# Patient Record
Sex: FEMALE | Race: OTHER | HISPANIC OR LATINO
[De-identification: names, ages, dates, MRNs, and addresses within clinical notes are randomized per-mention and may not be internally consistent; named-entity substitution may affect disease eponyms.]

---

## 2017-07-14 PROBLEM — Z00.00 ENCOUNTER FOR PREVENTIVE HEALTH EXAMINATION: Status: ACTIVE | Noted: 2017-07-14

## 2017-07-20 ENCOUNTER — APPOINTMENT (OUTPATIENT)
Dept: OTOLARYNGOLOGY | Facility: CLINIC | Age: 72
End: 2017-07-20

## 2017-07-20 VITALS — WEIGHT: 152 LBS | BODY MASS INDEX: 28.7 KG/M2 | HEIGHT: 61 IN

## 2017-07-20 DIAGNOSIS — E11.9 TYPE 2 DIABETES MELLITUS W/OUT COMPLICATIONS: ICD-10-CM

## 2017-07-20 DIAGNOSIS — I10 ESSENTIAL (PRIMARY) HYPERTENSION: ICD-10-CM

## 2017-07-20 DIAGNOSIS — Z78.9 OTHER SPECIFIED HEALTH STATUS: ICD-10-CM

## 2017-07-20 RX ORDER — METOPROLOL SUCCINATE 25 MG/1
25 TABLET, EXTENDED RELEASE ORAL
Qty: 30 | Refills: 0 | Status: ACTIVE | COMMUNITY
Start: 2017-02-03

## 2017-07-20 RX ORDER — SERTRALINE HYDROCHLORIDE 100 MG/1
100 TABLET, FILM COATED ORAL
Qty: 30 | Refills: 0 | Status: ACTIVE | COMMUNITY
Start: 2017-04-17

## 2017-08-17 ENCOUNTER — APPOINTMENT (OUTPATIENT)
Dept: OTOLARYNGOLOGY | Facility: CLINIC | Age: 72
End: 2017-08-17
Payer: MEDICARE

## 2017-08-17 VITALS — WEIGHT: 152 LBS | HEIGHT: 61 IN | BODY MASS INDEX: 28.7 KG/M2

## 2017-08-17 PROCEDURE — 99213 OFFICE O/P EST LOW 20 MIN: CPT | Mod: 25

## 2017-08-17 PROCEDURE — 31575 DIAGNOSTIC LARYNGOSCOPY: CPT

## 2017-09-28 ENCOUNTER — APPOINTMENT (OUTPATIENT)
Dept: OTOLARYNGOLOGY | Facility: CLINIC | Age: 72
End: 2017-09-28

## 2017-12-11 ENCOUNTER — OUTPATIENT (OUTPATIENT)
Dept: OUTPATIENT SERVICES | Facility: HOSPITAL | Age: 72
LOS: 1 days | Discharge: HOME | End: 2017-12-11

## 2017-12-20 DIAGNOSIS — H52.7 UNSPECIFIED DISORDER OF REFRACTION: ICD-10-CM

## 2017-12-20 DIAGNOSIS — H57.11 OCULAR PAIN, RIGHT EYE: ICD-10-CM

## 2017-12-20 DIAGNOSIS — H40.019 OPEN ANGLE WITH BORDERLINE FINDINGS, LOW RISK, UNSPECIFIED EYE: ICD-10-CM

## 2018-04-10 ENCOUNTER — OUTPATIENT (OUTPATIENT)
Dept: OUTPATIENT SERVICES | Facility: HOSPITAL | Age: 73
LOS: 1 days | Discharge: HOME | End: 2018-04-10

## 2018-04-10 DIAGNOSIS — M15.0 PRIMARY GENERALIZED (OSTEO)ARTHRITIS: ICD-10-CM

## 2018-04-10 DIAGNOSIS — M54.2 CERVICALGIA: ICD-10-CM

## 2018-07-23 ENCOUNTER — EMERGENCY (EMERGENCY)
Facility: HOSPITAL | Age: 73
LOS: 0 days | Discharge: HOME | End: 2018-07-23
Attending: EMERGENCY MEDICINE | Admitting: EMERGENCY MEDICINE

## 2018-07-23 VITALS
TEMPERATURE: 97 F | DIASTOLIC BLOOD PRESSURE: 69 MMHG | SYSTOLIC BLOOD PRESSURE: 139 MMHG | HEART RATE: 70 BPM | OXYGEN SATURATION: 95 % | RESPIRATION RATE: 18 BRPM

## 2018-07-23 VITALS — HEIGHT: 61 IN | WEIGHT: 149.03 LBS

## 2018-07-23 DIAGNOSIS — R51 HEADACHE: ICD-10-CM

## 2018-07-23 DIAGNOSIS — G89.29 OTHER CHRONIC PAIN: ICD-10-CM

## 2018-07-23 DIAGNOSIS — I10 ESSENTIAL (PRIMARY) HYPERTENSION: ICD-10-CM

## 2018-07-23 LAB
ANION GAP SERPL CALC-SCNC: 17 MMOL/L — HIGH (ref 7–14)
BUN SERPL-MCNC: 19 MG/DL — SIGNIFICANT CHANGE UP (ref 10–20)
CALCIUM SERPL-MCNC: 9.6 MG/DL — SIGNIFICANT CHANGE UP (ref 8.5–10.1)
CHLORIDE SERPL-SCNC: 101 MMOL/L — SIGNIFICANT CHANGE UP (ref 98–110)
CO2 SERPL-SCNC: 23 MMOL/L — SIGNIFICANT CHANGE UP (ref 17–32)
CREAT SERPL-MCNC: 0.7 MG/DL — SIGNIFICANT CHANGE UP (ref 0.7–1.5)
ERYTHROCYTE [SEDIMENTATION RATE] IN BLOOD: 12 MM/HR — SIGNIFICANT CHANGE UP (ref 0–20)
GLUCOSE SERPL-MCNC: 128 MG/DL — HIGH (ref 70–99)
HCT VFR BLD CALC: 39.9 % — SIGNIFICANT CHANGE UP (ref 37–47)
HGB BLD-MCNC: 13.4 G/DL — SIGNIFICANT CHANGE UP (ref 12–16)
MCHC RBC-ENTMCNC: 29.7 PG — SIGNIFICANT CHANGE UP (ref 27–31)
MCHC RBC-ENTMCNC: 33.6 G/DL — SIGNIFICANT CHANGE UP (ref 32–37)
MCV RBC AUTO: 88.5 FL — SIGNIFICANT CHANGE UP (ref 81–99)
NRBC # BLD: 0 /100 WBCS — SIGNIFICANT CHANGE UP (ref 0–0)
PLATELET # BLD AUTO: 170 K/UL — SIGNIFICANT CHANGE UP (ref 130–400)
POTASSIUM SERPL-MCNC: 4.6 MMOL/L — SIGNIFICANT CHANGE UP (ref 3.5–5)
POTASSIUM SERPL-SCNC: 4.6 MMOL/L — SIGNIFICANT CHANGE UP (ref 3.5–5)
RBC # BLD: 4.51 M/UL — SIGNIFICANT CHANGE UP (ref 4.2–5.4)
RBC # FLD: 13.8 % — SIGNIFICANT CHANGE UP (ref 11.5–14.5)
SODIUM SERPL-SCNC: 141 MMOL/L — SIGNIFICANT CHANGE UP (ref 135–146)
WBC # BLD: 8.02 K/UL — SIGNIFICANT CHANGE UP (ref 4.8–10.8)
WBC # FLD AUTO: 8.02 K/UL — SIGNIFICANT CHANGE UP (ref 4.8–10.8)

## 2018-07-23 RX ORDER — SODIUM CHLORIDE 9 MG/ML
1000 INJECTION INTRAMUSCULAR; INTRAVENOUS; SUBCUTANEOUS ONCE
Qty: 0 | Refills: 0 | Status: COMPLETED | OUTPATIENT
Start: 2018-07-23 | End: 2018-07-23

## 2018-07-23 RX ORDER — METOCLOPRAMIDE HCL 10 MG
10 TABLET ORAL ONCE
Qty: 0 | Refills: 0 | Status: COMPLETED | OUTPATIENT
Start: 2018-07-23 | End: 2018-07-23

## 2018-07-23 RX ADMIN — SODIUM CHLORIDE 1000 MILLILITER(S): 9 INJECTION INTRAMUSCULAR; INTRAVENOUS; SUBCUTANEOUS at 17:32

## 2018-07-23 NOTE — ED PROVIDER NOTE - MEDICAL DECISION MAKING DETAILS
73 female here with headache, appears non emergent, with outpatient workup already, has new joint symptoms and neck symptoms. Got imaging labs and reevalaution remains clinically well will discharge with return & Follow up instructions

## 2018-10-26 ENCOUNTER — EMERGENCY (EMERGENCY)
Facility: HOSPITAL | Age: 73
LOS: 0 days | Discharge: HOME | End: 2018-10-26
Admitting: PHYSICIAN ASSISTANT

## 2018-10-26 VITALS
RESPIRATION RATE: 18 BRPM | SYSTOLIC BLOOD PRESSURE: 163 MMHG | DIASTOLIC BLOOD PRESSURE: 79 MMHG | HEART RATE: 71 BPM | TEMPERATURE: 97 F | OXYGEN SATURATION: 98 %

## 2018-10-26 DIAGNOSIS — Z79.899 OTHER LONG TERM (CURRENT) DRUG THERAPY: ICD-10-CM

## 2018-10-26 DIAGNOSIS — G50.0 TRIGEMINAL NEURALGIA: ICD-10-CM

## 2018-10-26 DIAGNOSIS — R51 HEADACHE: ICD-10-CM

## 2018-10-26 DIAGNOSIS — I10 ESSENTIAL (PRIMARY) HYPERTENSION: ICD-10-CM

## 2018-10-26 RX ORDER — CARBAMAZEPINE 200 MG
1 TABLET ORAL
Qty: 20 | Refills: 0
Start: 2018-10-26 | End: 2018-11-04

## 2018-10-26 RX ORDER — KETOROLAC TROMETHAMINE 30 MG/ML
30 SYRINGE (ML) INJECTION ONCE
Qty: 0 | Refills: 0 | Status: DISCONTINUED | OUTPATIENT
Start: 2018-10-26 | End: 2018-10-26

## 2018-10-26 RX ADMIN — Medication 30 MILLIGRAM(S): at 20:10

## 2018-10-26 NOTE — ED ADULT NURSE NOTE - OBJECTIVE STATEMENT
Patient with complaints of persistent headache [id number] worsening today prompting ER visit. No relief with ibuprofen. No visual disturbances no dizziness no LOC

## 2018-10-26 NOTE — ED PROVIDER NOTE - OBJECTIVE STATEMENT
Pt has chronic headaches x 3 years. Pain almost everyday. Has been seen by neurology multiple times and had many tests and was told had trigeminal neuralgia and surgery was recommended when meds failed. Pt refused the surgery and instead tried botox injections recently which seemed to have failed. Pt is here just for pain relief. Denies fever, NV, dizziness, weakness, numbness, dif walking. Pt also has hx of HTN. pain is between eyes and to forehead. Denies vison loss or change

## 2018-10-26 NOTE — ED ADULT NURSE NOTE - NSIMPLEMENTINTERV_GEN_ALL_ED
Implemented All Universal Safety Interventions:  [place] to call system. Call bell, personal items and telephone within reach. Instruct patient to call for assistance. Room bathroom lighting operational. Non-slip footwear when patient is off stretcher. Physically safe environment: no spills, clutter or unnecessary equipment. Stretcher in lowest position, wheels locked, appropriate side rails in place.

## 2018-10-26 NOTE — ED PROVIDER NOTE - PHYSICAL EXAMINATION
CONST: Well appearing in NAD  EYES: PERRL, EOMI, Sclera and conjunctiva clear.   ENT: No nasal discharge. TM's clear B/L without drainage. Oropharynx normal appearing, no erythema or exudates. Uvula midline.  NECK: Non-tender, no meningeal signs  CARD: Normal S1 S2; Normal rate and rhythm  RESP: Equal BS B/L, No wheezes, rhonchi or rales. No distress  GI: Soft, non-tender, non-distended.  MS: Normal ROM in all extremities. No midline spinal tenderness.  SKIN: Warm, dry, no acute rashes. Good turgor  NEURO: A&Ox3, No focal deficits. Strength 5/5 with no sensory deficits. Steady gait. Neg Romberg, Neg supinator drift

## 2018-10-27 PROBLEM — I10 ESSENTIAL (PRIMARY) HYPERTENSION: Chronic | Status: ACTIVE | Noted: 2018-07-23

## 2018-11-06 ENCOUNTER — EMERGENCY (EMERGENCY)
Facility: HOSPITAL | Age: 73
LOS: 0 days | Discharge: HOME | End: 2018-11-06
Attending: EMERGENCY MEDICINE | Admitting: EMERGENCY MEDICINE

## 2018-11-06 VITALS
OXYGEN SATURATION: 95 % | SYSTOLIC BLOOD PRESSURE: 189 MMHG | RESPIRATION RATE: 20 BRPM | HEART RATE: 83 BPM | TEMPERATURE: 98 F | DIASTOLIC BLOOD PRESSURE: 87 MMHG

## 2018-11-06 VITALS
OXYGEN SATURATION: 95 % | RESPIRATION RATE: 18 BRPM | SYSTOLIC BLOOD PRESSURE: 133 MMHG | HEART RATE: 70 BPM | DIASTOLIC BLOOD PRESSURE: 78 MMHG

## 2018-11-06 DIAGNOSIS — R51 HEADACHE: ICD-10-CM

## 2018-11-06 DIAGNOSIS — F32.9 MAJOR DEPRESSIVE DISORDER, SINGLE EPISODE, UNSPECIFIED: ICD-10-CM

## 2018-11-06 DIAGNOSIS — I10 ESSENTIAL (PRIMARY) HYPERTENSION: ICD-10-CM

## 2018-11-06 DIAGNOSIS — Z79.52 LONG TERM (CURRENT) USE OF SYSTEMIC STEROIDS: ICD-10-CM

## 2018-11-06 DIAGNOSIS — Z79.811 LONG TERM (CURRENT) USE OF AROMATASE INHIBITORS: ICD-10-CM

## 2018-11-06 DIAGNOSIS — Z79.899 OTHER LONG TERM (CURRENT) DRUG THERAPY: ICD-10-CM

## 2018-11-06 RX ORDER — KETOROLAC TROMETHAMINE 30 MG/ML
15 SYRINGE (ML) INJECTION ONCE
Qty: 0 | Refills: 0 | Status: DISCONTINUED | OUTPATIENT
Start: 2018-11-06 | End: 2018-11-06

## 2018-11-06 RX ORDER — DEXAMETHASONE 0.5 MG/5ML
10 ELIXIR ORAL ONCE
Qty: 0 | Refills: 0 | Status: COMPLETED | OUTPATIENT
Start: 2018-11-06 | End: 2018-11-06

## 2018-11-06 RX ORDER — METOCLOPRAMIDE HCL 10 MG
10 TABLET ORAL ONCE
Qty: 0 | Refills: 0 | Status: COMPLETED | OUTPATIENT
Start: 2018-11-06 | End: 2018-11-06

## 2018-11-06 RX ORDER — SODIUM CHLORIDE 9 MG/ML
1000 INJECTION, SOLUTION INTRAVENOUS ONCE
Qty: 0 | Refills: 0 | Status: COMPLETED | OUTPATIENT
Start: 2018-11-06 | End: 2018-11-06

## 2018-11-06 RX ADMIN — Medication 10 MILLIGRAM(S): at 12:47

## 2018-11-06 RX ADMIN — SODIUM CHLORIDE 1000 MILLILITER(S): 9 INJECTION, SOLUTION INTRAVENOUS at 12:26

## 2018-11-06 RX ADMIN — Medication 15 MILLIGRAM(S): at 12:25

## 2018-11-06 RX ADMIN — Medication 10 MILLIGRAM(S): at 12:40

## 2018-11-06 RX ADMIN — Medication 15 MILLIGRAM(S): at 12:41

## 2018-11-06 NOTE — ED ADULT NURSE NOTE - NSIMPLEMENTINTERV_GEN_ALL_ED
Implemented All Universal Safety Interventions:  [place] to call system. Call bell, personal items and telephone within reach. Instruct patient to call for assistance. Room bathroom lighting operational. Non-slip footwear when patient is off stretcher. Physically safe environment: no spills, clutter or unnecessary equipment. Stretcher in lowest position, wheels locked, appropriate side rails in place.

## 2018-11-06 NOTE — ED PROVIDER NOTE - NSFOLLOWUPINSTRUCTIONS_ED_ALL_ED_FT
Headache    A headache is pain or discomfort felt around the head or neck area. The specific cause of a headache may not be found as there are many types including tension headaches, migraine headaches, and cluster headaches. Watch your condition for any changes. Things you can do to manage your pain include taking over the counter and prescription medications as instructed by your health care provider, lying down in a dark quiet room, limiting stress, getting regular sleep, and refraining from alcohol and tobacco products.    SEEK IMMEDIATE MEDICAL CARE IF YOU HAVE ANY OF THE FOLLOWING SYMPTOMS: fever, vomiting, stiff neck, loss of vision, problems with speech, muscle weakness, loss of balance, trouble walking, passing out, or confusion.

## 2018-11-06 NOTE — ED PROVIDER NOTE - NS ED ROS FT
Constitutional: No fever, chills.  Eyes: No visual changes.  ENT: No hearing changes. No sore throat.  Neck: No neck pain or stiffness.  Cardiovascular: No chest pain, palpitations, edema.  Pulmonary: No SOB, cough. No hemoptysis.  Abdominal: No abdominal pain, nausea, vomiting, diarrhea.  : No dysuria, frequency.  Neuro: + headache. No syncope, dizziness.  MS: No back pain. No calf pain/swelling.  Psych: No suicidal ideations.

## 2018-11-06 NOTE — ED PROVIDER NOTE - ATTENDING CONTRIBUTION TO CARE
I personally evaluated patient. I agree with the findings and plan with all documentation on chart except as documented  in my note.    Patient well appearing with a normal neuro exam and headache improved in the ED. Patient has had an extensive work up for her headache in multiple institutions form multiple speciliaist, which included CT scanning, MRI, MRA, Botox injections, multiple sinus surgeries, oral medications, migraine prophylaxis medications, facial shots. Patient even contemplating gamma knife surgery. Explained to patient that she is well appearing and her condition is outside the expertise and capacity of the Emergency Department    Full DC instructions discussed and patient knows when to seek immediate medical attention.  Patient has proper follow up.  All results discussed and patient aware they may require further work up.  Proper follow up ensured. Limitations of ED work up discussed.  Medications administered and prescribed/OTC home meds discussed.  All questions and concerns from patient or family addressed. Understanding of instructions verbalized.

## 2018-11-06 NOTE — ED PROVIDER NOTE - PROGRESS NOTE DETAILS
PT feels better after medication. Will discharge pt home with neurology followup. All questions answered. Strict return precautions given.

## 2018-11-06 NOTE — ED PROVIDER NOTE - MEDICAL DECISION MAKING DETAILS
Patient well appearing with a normal neuro exam and headache improved in the ED. Patient has had an extensive work up for her headache in multiple institutions form multiple speciliaist, which included CT scanning, MRI, MRA, Botox injections, multiple sinus surgeries, oral medications, migraine prophylaxis medications, facial shots. Patient even contemplating gamma knife surgery. Explained to patient that she is well appearing and her condition is outside the expertise and capacity of the Emergency Department    Full DC instructions discussed and patient knows when to seek immediate medical attention.  Patient has proper follow up.  All results discussed and patient aware they may require further work up.  Proper follow up ensured. Limitations of ED work up discussed.  Medications administered and prescribed/OTC home meds discussed.  All questions and concerns from patient or family addressed. Understanding of instructions verbalized.

## 2018-11-06 NOTE — ED PROVIDER NOTE - OBJECTIVE STATEMENT
Pt is a 74 y/o female with hx of anxiety, depression, HTN, chronic headache d/o presents to ED for frontal headache, with feels similar to headache pt has been experiencing for 3 years, daily. Pt has seen multiple specialists including neurology, neurosurgery, pain management. Pt has went to [facility] and [facility] as well. Pt has had CT's MRI's with no diagnosis. Possible pinched nerve as diagnosis. No visual changes, fever, neck pain.

## 2018-11-06 NOTE — ED PROVIDER NOTE - PHYSICAL EXAMINATION
Constitutional: Well developed, well nourished. NAD.  Head: Normocephalic, atraumatic.  Eyes: PERRL. EOMI.  ENT: No nasal discharge. Mucous membranes moist.  Neck: Supple. Painless ROM.  Cardiovascular: Normal S1, S2. Regular rate and rhythm. No murmurs, rubs, or gallops.  Pulmonary: Normal respiratory rate and effort. Lungs clear to auscultation bilaterally. No wheezing, rales, or rhonchi.  Abdominal: Soft. Nondistended. Nontender. No rebound, guarding, rigidity.  Extremities. Pelvis stable. No lower extremity edema, symmetric calves.  Skin: No rashes, cyanosis.  Neuro: CN II-XII grossly intact, no facial asymmetry, no slurred speech. Strength 5/5 in upper and lower extremities. Sensation intact in all extremities. FNF testing normal. No pronator drift.   Psych: Normal mood. Normal affect.

## 2018-11-06 NOTE — ED PROVIDER NOTE - CARE PROVIDER_API CALL
Luis Sheffield), EEGEpilepsy; Neurology  [facility]  [address] [place] [address]  Phone: [phone]  Fax: [phone]

## 2018-11-07 RX ORDER — METOCLOPRAMIDE HCL 10 MG
1 TABLET ORAL
Qty: 20 | Refills: 0
Start: 2018-11-07 | End: 2018-11-13

## 2019-05-02 ENCOUNTER — APPOINTMENT (OUTPATIENT)
Dept: NEUROLOGY | Facility: CLINIC | Age: 74
End: 2019-05-02
Payer: MEDICARE

## 2019-05-02 PROCEDURE — 99213 OFFICE O/P EST LOW 20 MIN: CPT

## 2019-06-06 ENCOUNTER — APPOINTMENT (OUTPATIENT)
Dept: NEUROLOGY | Facility: CLINIC | Age: 74
End: 2019-06-06

## 2019-06-10 ENCOUNTER — APPOINTMENT (OUTPATIENT)
Dept: OTOLARYNGOLOGY | Facility: CLINIC | Age: 74
End: 2019-06-10
Payer: MEDICARE

## 2019-06-10 VITALS
SYSTOLIC BLOOD PRESSURE: 130 MMHG | HEIGHT: 61 IN | DIASTOLIC BLOOD PRESSURE: 86 MMHG | BODY MASS INDEX: 28.7 KG/M2 | WEIGHT: 152 LBS

## 2019-06-10 DIAGNOSIS — H61.22 IMPACTED CERUMEN, LEFT EAR: ICD-10-CM

## 2019-06-10 PROCEDURE — 69210 REMOVE IMPACTED EAR WAX UNI: CPT

## 2019-06-10 PROCEDURE — 99214 OFFICE O/P EST MOD 30 MIN: CPT | Mod: 25

## 2019-06-10 PROCEDURE — 31231 NASAL ENDOSCOPY DX: CPT

## 2019-06-10 RX ORDER — OMEPRAZOLE 40 MG/1
40 CAPSULE, DELAYED RELEASE ORAL
Qty: 30 | Refills: 2 | Status: DISCONTINUED | COMMUNITY
Start: 2019-06-10 | End: 2019-06-10

## 2019-06-10 NOTE — HISTORY OF PRESENT ILLNESS
[FreeTextEntry1] : 6/10 /19 patient is following up for clogged ears, and h/o chronic sinusitis.  Patient has yellow drainage with nasal congestion and HA. She is using flonase. Patient saw Dr. Jiménez and was recommended to have a sinus procedure.  She has a h/o of sinus surgery x 2 in the past, most recent was 4 years ago.  No allergies.  Patient also c/o bad taste in her mouth. \par SHe also complains of clogged ears on and off.\par \par She is now experiencing acid reflux symptoms and tongue burning.

## 2019-06-10 NOTE — PHYSICAL EXAM
[de-identified] : right neck LN [de-identified] : left cerumen impaction [Midline] : trachea located in midline position [Normal] : no rashes

## 2019-06-10 NOTE — REASON FOR VISIT
[Subsequent Evaluation] : a subsequent evaluation for [FreeTextEntry2] : clogged ears, and h/o chronic sinusitis.

## 2019-06-11 ENCOUNTER — RX RENEWAL (OUTPATIENT)
Age: 74
End: 2019-06-11

## 2019-06-11 DIAGNOSIS — G43.C1: ICD-10-CM

## 2019-08-21 ENCOUNTER — EMERGENCY (EMERGENCY)
Facility: HOSPITAL | Age: 74
LOS: 0 days | Discharge: HOME | End: 2019-08-22
Attending: EMERGENCY MEDICINE | Admitting: EMERGENCY MEDICINE
Payer: MEDICARE

## 2019-08-21 VITALS
TEMPERATURE: 98 F | SYSTOLIC BLOOD PRESSURE: 190 MMHG | DIASTOLIC BLOOD PRESSURE: 94 MMHG | HEART RATE: 64 BPM | RESPIRATION RATE: 17 BRPM | WEIGHT: 134.92 LBS | HEIGHT: 62 IN | OXYGEN SATURATION: 97 %

## 2019-08-21 DIAGNOSIS — J32.9 CHRONIC SINUSITIS, UNSPECIFIED: ICD-10-CM

## 2019-08-21 DIAGNOSIS — R51 HEADACHE: ICD-10-CM

## 2019-08-21 LAB
ALBUMIN SERPL ELPH-MCNC: 4.9 G/DL — SIGNIFICANT CHANGE UP (ref 3.5–5.2)
ALP SERPL-CCNC: 83 U/L — SIGNIFICANT CHANGE UP (ref 30–115)
ALT FLD-CCNC: 14 U/L — SIGNIFICANT CHANGE UP (ref 0–41)
ANION GAP SERPL CALC-SCNC: 12 MMOL/L — SIGNIFICANT CHANGE UP (ref 7–14)
AST SERPL-CCNC: 22 U/L — SIGNIFICANT CHANGE UP (ref 0–41)
BASOPHILS # BLD AUTO: 0.04 K/UL — SIGNIFICANT CHANGE UP (ref 0–0.2)
BASOPHILS NFR BLD AUTO: 0.5 % — SIGNIFICANT CHANGE UP (ref 0–1)
BILIRUB SERPL-MCNC: 0.4 MG/DL — SIGNIFICANT CHANGE UP (ref 0.2–1.2)
BUN SERPL-MCNC: 17 MG/DL — SIGNIFICANT CHANGE UP (ref 10–20)
CALCIUM SERPL-MCNC: 9.7 MG/DL — SIGNIFICANT CHANGE UP (ref 8.5–10.1)
CHLORIDE SERPL-SCNC: 103 MMOL/L — SIGNIFICANT CHANGE UP (ref 98–110)
CO2 SERPL-SCNC: 28 MMOL/L — SIGNIFICANT CHANGE UP (ref 17–32)
CREAT SERPL-MCNC: 0.7 MG/DL — SIGNIFICANT CHANGE UP (ref 0.7–1.5)
EOSINOPHIL # BLD AUTO: 0.3 K/UL — SIGNIFICANT CHANGE UP (ref 0–0.7)
EOSINOPHIL NFR BLD AUTO: 3.9 % — SIGNIFICANT CHANGE UP (ref 0–8)
GLUCOSE SERPL-MCNC: 99 MG/DL — SIGNIFICANT CHANGE UP (ref 70–99)
HCT VFR BLD CALC: 40.8 % — SIGNIFICANT CHANGE UP (ref 37–47)
HGB BLD-MCNC: 13.6 G/DL — SIGNIFICANT CHANGE UP (ref 12–16)
IMM GRANULOCYTES NFR BLD AUTO: 0.1 % — SIGNIFICANT CHANGE UP (ref 0.1–0.3)
LYMPHOCYTES # BLD AUTO: 3.1 K/UL — SIGNIFICANT CHANGE UP (ref 1.2–3.4)
LYMPHOCYTES # BLD AUTO: 40.2 % — SIGNIFICANT CHANGE UP (ref 20.5–51.1)
MCHC RBC-ENTMCNC: 30.4 PG — SIGNIFICANT CHANGE UP (ref 27–31)
MCHC RBC-ENTMCNC: 33.3 G/DL — SIGNIFICANT CHANGE UP (ref 32–37)
MCV RBC AUTO: 91.3 FL — SIGNIFICANT CHANGE UP (ref 81–99)
MONOCYTES # BLD AUTO: 0.44 K/UL — SIGNIFICANT CHANGE UP (ref 0.1–0.6)
MONOCYTES NFR BLD AUTO: 5.7 % — SIGNIFICANT CHANGE UP (ref 1.7–9.3)
NEUTROPHILS # BLD AUTO: 3.83 K/UL — SIGNIFICANT CHANGE UP (ref 1.4–6.5)
NEUTROPHILS NFR BLD AUTO: 49.6 % — SIGNIFICANT CHANGE UP (ref 42.2–75.2)
NRBC # BLD: 0 /100 WBCS — SIGNIFICANT CHANGE UP (ref 0–0)
PLATELET # BLD AUTO: 163 K/UL — SIGNIFICANT CHANGE UP (ref 130–400)
POTASSIUM SERPL-MCNC: 4.3 MMOL/L — SIGNIFICANT CHANGE UP (ref 3.5–5)
POTASSIUM SERPL-SCNC: 4.3 MMOL/L — SIGNIFICANT CHANGE UP (ref 3.5–5)
PROT SERPL-MCNC: 7.5 G/DL — SIGNIFICANT CHANGE UP (ref 6–8)
RBC # BLD: 4.47 M/UL — SIGNIFICANT CHANGE UP (ref 4.2–5.4)
RBC # FLD: 14.1 % — SIGNIFICANT CHANGE UP (ref 11.5–14.5)
SODIUM SERPL-SCNC: 143 MMOL/L — SIGNIFICANT CHANGE UP (ref 135–146)
WBC # BLD: 7.72 K/UL — SIGNIFICANT CHANGE UP (ref 4.8–10.8)
WBC # FLD AUTO: 7.72 K/UL — SIGNIFICANT CHANGE UP (ref 4.8–10.8)

## 2019-08-21 PROCEDURE — 70450 CT HEAD/BRAIN W/O DYE: CPT | Mod: 26

## 2019-08-21 PROCEDURE — 93010 ELECTROCARDIOGRAM REPORT: CPT

## 2019-08-21 PROCEDURE — 99284 EMERGENCY DEPT VISIT MOD MDM: CPT

## 2019-08-21 PROCEDURE — 70486 CT MAXILLOFACIAL W/O DYE: CPT | Mod: 26

## 2019-08-21 RX ORDER — SODIUM CHLORIDE 9 MG/ML
1000 INJECTION INTRAMUSCULAR; INTRAVENOUS; SUBCUTANEOUS ONCE
Refills: 0 | Status: COMPLETED | OUTPATIENT
Start: 2019-08-21 | End: 2019-08-21

## 2019-08-21 RX ORDER — ACETAMINOPHEN 500 MG
975 TABLET ORAL ONCE
Refills: 0 | Status: COMPLETED | OUTPATIENT
Start: 2019-08-21 | End: 2019-08-21

## 2019-08-21 RX ORDER — METOCLOPRAMIDE HCL 10 MG
10 TABLET ORAL ONCE
Refills: 0 | Status: COMPLETED | OUTPATIENT
Start: 2019-08-21 | End: 2019-08-21

## 2019-08-21 RX ADMIN — Medication 975 MILLIGRAM(S): at 19:21

## 2019-08-21 RX ADMIN — SODIUM CHLORIDE 1000 MILLILITER(S): 9 INJECTION INTRAMUSCULAR; INTRAVENOUS; SUBCUTANEOUS at 19:21

## 2019-08-21 RX ADMIN — Medication 10 MILLIGRAM(S): at 19:21

## 2019-08-21 NOTE — ED PROVIDER NOTE - ATTENDING CONTRIBUTION TO CARE
75 yo f with pmh of migraines on imitrex and inoviq, htn, presents with worsened frontal headache today.  pt says also has neck pain, but usual presentation for her migraines.  pt says she follows with dr. segundo for neuro.  no n/v/d.  admits has facial pain today with nasal congestion.  no abd pain, no cp, no n/v/d.  no fever, no chills.  exam: nad, ncat, perrl, eomi, mmm, rrr, ctab, abd soft, nt,nd, aox3, cn2-12 normal, 5/5 strength all ext, sensation intact, finger to nose normal, imp: pt with worsened headache, normal neuro exam, well-appearing, will check labs, ct, symptomatic treatment

## 2019-08-21 NOTE — ED PROVIDER NOTE - PHYSICAL EXAMINATION
GENERAL: Well-nourished, Well-developed. NAD.  HEAD: No visible or palpable bumps or hematomas. No ecchymosis behind ears B/L.  Eyes: PERRLA, EOMI. Pink conjunctiva B/L. No asymmetry. No nystagmus. No conjunctival injection. Non-icteric sclera.  ENMT: + TTP to frontal and maxillary sinus. MMM. No pharyngeal erythema or exudates. Uvula midline. No oral lesions. TMs clear with good cone of light B/L. Nares patent.   Neck: Supple. No LAD. No cervical midline TTP. No paravertebral TTP to traps. FROM  CVS: Normal S1,S2. No murmurs appreciated on auscultation   RESP: No use of accessory muscles. Chest rise symmetrical with good expansion. Lungs clear to auscultation B/L. No wheezing, rales, or rhonchi auscultated.  MSK: FROM of upper and lower extremities B/L  Skin: Warm, Dry. No rashes or lesions.  EXT: Radial pulses present B/L. No calf tenderness or swelling B/L.   Neuro: AA&O x 3. Mini mental status exam intact. CNs II-XII grossly intact. Speaking in full sentences. No slurring of speech. No facial droop. No tremors. Sensation grossly intact. Strength 5/5 B/L. Gait within normal limits.   Psych:  Cooperative. Calm

## 2019-08-21 NOTE — ED PROVIDER NOTE - OBJECTIVE STATEMENT
75 yo female with PMH of migraines, HTN, Depression, frequent sinus infections 73 yo female with PMH of migraines (on imitrex and inoviq), HTN, Depression, frequent sinus infections presents to the ED c/o frontal headache, sinus pressure, runny nose, and nasal congestion that worsened today.  Patient reports frontal headache radiating to back of head and down neck, which is usual presentation when she has a migraine.  Patient has been having frequent migraines for the  past 3-4 years and is a patient of Dr. Jimenez, neuro.  Denies fever, chills, chest pain, SOB, photophobia, blurry vision, flashing lights, syncope, dizziness, N/V/D, head injury, sore throat, or ear pain.

## 2019-08-21 NOTE — ED ADULT NURSE NOTE - GASTROINTESTINAL ASSESSMENT
Was the patient seen in the last year in this department? Yes     Does patient have an active prescription for medications requested? Yes     Received Request Via: Pharmacy  
WDL

## 2019-08-21 NOTE — ED PROVIDER NOTE - NS ED ROS FT
Constitutional: (-) fever (-) chills   Eyes: (-) visual changes (-) eye pain (-) photophobia   ENMT: (+) nasal congestion (+) runny nose (+) sinus pressure (-) sore throat (-) hearing changes (-) ear pain (+) neck pain (-) neck stiffness  Cardiac: (-) chest pain  Respiratory:  (-) SOB  GI: (-) nausea (-) vomiting (-) diarrhea   MS: (-) back pain  Neuro: (+) headache (-) dizziness (-) numbness/tingling to extremities B/L (-) weakness  Skin: (-) rash   Except as documented in the HPI, all other systems are negative.

## 2019-08-21 NOTE — ED ADULT NURSE NOTE - OBJECTIVE STATEMENT
pt c/o tension migraine with neck and sinus pain/pressure since today. denies n/v/d. denies fever/chills. denies any numbness/tingling /weakness. denies vision changes. denies chest pain/sob. pt states she has a hx of migraines and sees a neurologist reguarly but the pain was so severe she decided to come in.

## 2019-08-21 NOTE — ED PROVIDER NOTE - CLINICAL SUMMARY MEDICAL DECISION MAKING FREE TEXT BOX
Pt signed out to me by Dr. Smith. FREGOSO resolved. Patient has opacification of the left maxillary sinuses. Will refer to ENT. Patient has no fever, white count. She is well appearing. I have full discussed the medical management and delivery of care with the patient. Patient confirms understanding and has been given detailed return precautions. Patient instructed to return to the ED should symptoms persist or worsen. Patient is well appearing upon discharge.

## 2019-08-21 NOTE — ED PROVIDER NOTE - NSFOLLOWUPINSTRUCTIONS_ED_ALL_ED_FT
Headache    A headache is pain or discomfort felt around the head or neck area. The specific cause of a headache may not be found as there are many types including tension headaches, migraine headaches, and cluster headaches. Watch your condition for any changes. Things you can do to manage your pain include taking over the counter and prescription medications as instructed by your health care provider, lying down in a dark quiet room, limiting stress, getting regular sleep, and refraining from alcohol and tobacco products.    SEEK IMMEDIATE MEDICAL CARE IF YOU HAVE ANY OF THE FOLLOWING SYMPTOMS: fever, vomiting, stiff neck, loss of vision, problems with speech, muscle weakness, loss of balance, trouble walking, passing out, or confusion. Headache    A headache is pain or discomfort felt around the head or neck area. The specific cause of a headache may not be found as there are many types including tension headaches, migraine headaches, and cluster headaches. Watch your condition for any changes. Things you can do to manage your pain include taking over the counter and prescription medications as instructed by your health care provider, lying down in a dark quiet room, limiting stress, getting regular sleep, and refraining from alcohol and tobacco products.    SEEK IMMEDIATE MEDICAL CARE IF YOU HAVE ANY OF THE FOLLOWING SYMPTOMS: fever, vomiting, stiff neck, loss of vision, problems with speech, muscle weakness, loss of balance, trouble walking, passing out, or confusion.    Sinusitis, Adult    Sinusitis is soreness and inflammation of your sinuses. Sinuses are hollow spaces in the bones around your face. Your sinuses are located:     Around your eyes.  In the middle of your forehead.  Behind your nose.  In your cheekbones.    Your sinuses and nasal passages are lined with a stringy fluid (mucus). Mucus normally drains out of your sinuses. When your nasal tissues become inflamed or swollen, the mucus can become trapped or blocked so air cannot flow through your sinuses. This allows bacteria, viruses, and funguses to grow, which leads to infection.    Sinusitis can develop quickly and last for 7?10 days (acute) or for more than 12 weeks (chronic). Sinusitis often develops after a cold.    CAUSES  This condition is caused by anything that creates swelling in the sinuses or stops mucus from draining, including:    Allergies.  Asthma.  Bacterial or viral infection.  Abnormally shaped bones between the nasal passages.  Nasal growths that contain mucus (nasal polyps).  Narrow sinus openings.  Pollutants, such as chemicals or irritants in the air.  A foreign object stuck in the nose.  A fungal infection. This is rare.     RISK FACTORS  The following factors may make you more likely to develop this condition:    Having allergies or asthma.  Having had a recent cold or respiratory tract infection.  Having structural deformities or blockages in your nose or sinuses.  Having a weak immune system.  Doing a lot of swimming or diving.  Overusing nasal sprays.  Smoking.    SYMPTOMS  The main symptoms of this condition are pain and a feeling of pressure around the affected sinuses. Other symptoms include:    Upper toothache.  Earache.  Headache.  Bad breath.  Decreased sense of smell and taste.  A cough that may get worse at night.  Fatigue.  Fever.  Thick drainage from your nose. The drainage is often green and it may contain pus (purulent).  Stuffy nose or congestion.  Postnasal drip. This is when extra mucus collects in the throat or back of the nose.  Swelling and warmth over the affected sinuses.  Sore throat.  Sensitivity to light.    DIAGNOSIS  This condition is diagnosed based on symptoms, a medical history, and a physical exam. To find out if your condition is acute or chronic, your health care provider may:    Look in your nose for signs of nasal polyps.  Tap over the affected sinus to check for signs of infection.  View the inside of your sinuses using an imaging device that has a light attached (endoscope).    If your health care provider suspects that you have chronic sinusitis, you may also:    Be tested for allergies.  Have a sample of mucus taken from your nose (nasal culture) and checked for bacteria.  Have a mucus sample examined to see if your sinusitis is related to an allergy.    If your sinusitis does not respond to treatment and it lasts longer than 8 weeks, you may have an MRI or CT scan to check your sinuses. These scans also help to determine how severe your infection is.    In rare cases, a bone biopsy may be done to rule out more serious types of fungal sinus disease.    TREATMENT  Treatment for sinusitis depends on the cause and whether your condition is chronic or acute. If a virus is causing your sinusitis, your symptoms will go away on their own within 10 days. You may be given medicines to relieve your symptoms, including:    Topical nasal decongestants. They shrink swollen nasal passages and let mucus drain from your sinuses.  Antihistamines. These drugs block inflammation that is triggered by allergies. This can help to ease swelling in your nose and sinuses.  Topical nasal corticosteroids. These are nasal sprays that ease inflammation and swelling in your nose and sinuses.  Nasal saline washes. These rinses can help to get rid of thick mucus in your nose.    If your condition is caused by bacteria, you will be given an antibiotic medicine. If your condition is caused by a fungus, you will be given an antifungal medicine.    Surgery may be needed to correct underlying conditions, such as narrow nasal passages. Surgery may also be needed to remove polyps.    HOME CARE INSTRUCTIONS  Medicines    Take, use, or apply over-the-counter and prescription medicines only as told by your health care provider. These may include nasal sprays.  If you were prescribed an antibiotic medicine, take it as told by your health care provider. Do not stop taking the antibiotic even if you start to feel better.    Hydrate and Humidify    Drink enough water to keep your urine clear or pale yellow. Staying hydrated will help to thin your mucus.  Use a cool mist humidifier to keep the humidity level in your home above 50%.  Inhale steam for 10–15 minutes, 3–4 times a day or as told by your health care provider. You can do this in the bathroom while a hot shower is running.  Limit your exposure to cool or dry air.    Rest    Rest as much as possible.  Sleep with your head raised (elevated).  Make sure to get enough sleep each night.    General Instructions    Apply a warm, moist washcloth to your face 3–4 times a day or as told by your health care provider. This will help with discomfort.  Wash your hands often with soap and water to reduce your exposure to viruses and other germs. If soap and water are not available, use hand .  Do not smoke. Avoid being around people who are smoking (secondhand smoke).  Keep all follow-up visits as told by your health care provider. This is important.    SEEK MEDICAL CARE IF:  You have a fever.  Your symptoms get worse.  Your symptoms do not improve within 10 days.    SEEK IMMEDIATE MEDICAL CARE IF:  You have a severe headache.  You have persistent vomiting.  You have pain or swelling around your face or eyes.  You have vision problems.  You develop confusion.  Your neck is stiff.  You have trouble breathing.    ADDITIONAL NOTES AND INSTRUCTIONS    Please follow up with your Primary MD in 24-48 hr.  Seek immediate medical care for any new/worsening signs or symptoms.

## 2019-08-21 NOTE — ED PROVIDER NOTE - NSFOLLOWUPCLINICS_GEN_ALL_ED_FT
[facility]  ENT  [address], [address]nd [place] [address]  Phone: [phone]  Fax:   Follow Up Time:

## 2019-08-22 VITALS
DIASTOLIC BLOOD PRESSURE: 70 MMHG | HEART RATE: 62 BPM | OXYGEN SATURATION: 97 % | TEMPERATURE: 96 F | RESPIRATION RATE: 18 BRPM | SYSTOLIC BLOOD PRESSURE: 143 MMHG

## 2019-08-27 ENCOUNTER — OTHER (OUTPATIENT)
Age: 74
End: 2019-08-27

## 2019-09-03 ENCOUNTER — OTHER (OUTPATIENT)
Age: 74
End: 2019-09-03

## 2019-09-05 PROBLEM — G43.909 MIGRAINE, UNSPECIFIED, NOT INTRACTABLE, WITHOUT STATUS MIGRAINOSUS: Chronic | Status: ACTIVE | Noted: 2019-08-21

## 2019-09-11 ENCOUNTER — APPOINTMENT (OUTPATIENT)
Dept: OTOLARYNGOLOGY | Facility: CLINIC | Age: 74
End: 2019-09-11
Payer: MEDICARE

## 2019-09-11 VITALS
DIASTOLIC BLOOD PRESSURE: 85 MMHG | HEIGHT: 61 IN | WEIGHT: 152 LBS | BODY MASS INDEX: 28.7 KG/M2 | SYSTOLIC BLOOD PRESSURE: 128 MMHG

## 2019-09-11 DIAGNOSIS — H61.20 IMPACTED CERUMEN, UNSPECIFIED EAR: ICD-10-CM

## 2019-09-11 PROCEDURE — 69210 REMOVE IMPACTED EAR WAX UNI: CPT

## 2019-09-11 PROCEDURE — 99214 OFFICE O/P EST MOD 30 MIN: CPT | Mod: 25

## 2019-09-11 PROCEDURE — 31231 NASAL ENDOSCOPY DX: CPT

## 2019-09-11 NOTE — PHYSICAL EXAM
[Midline] : trachea located in midline position [Normal] : no rashes [de-identified] : right cerumen

## 2019-09-11 NOTE — HISTORY OF PRESENT ILLNESS
[de-identified] : Patient returns to the office as a follow up on headaches. Pt went to ER on 8/21/19 and CT was performed . Patient was seen in the hospital 2 weeks ago with acute sinusitis. She was treated with Augmentin which resolved symptoms.  Patient has had frequent sinus infections. SHe has a history of sinus surgery twice. \par No anosmia.\par \par She also complains of dry mouth and burning in tongue. She took omeprazole that didn't help.

## 2019-10-23 ENCOUNTER — RX RENEWAL (OUTPATIENT)
Age: 74
End: 2019-10-23

## 2019-10-29 ENCOUNTER — RESULT REVIEW (OUTPATIENT)
Age: 74
End: 2019-10-29

## 2019-10-29 ENCOUNTER — OUTPATIENT (OUTPATIENT)
Dept: OUTPATIENT SERVICES | Facility: HOSPITAL | Age: 74
LOS: 1 days | Discharge: HOME | End: 2019-10-29
Payer: MEDICARE

## 2019-10-29 ENCOUNTER — TRANSCRIPTION ENCOUNTER (OUTPATIENT)
Age: 74
End: 2019-10-29

## 2019-10-29 VITALS — SYSTOLIC BLOOD PRESSURE: 126 MMHG | HEART RATE: 63 BPM | DIASTOLIC BLOOD PRESSURE: 75 MMHG | RESPIRATION RATE: 16 BRPM

## 2019-10-29 VITALS
HEART RATE: 77 BPM | TEMPERATURE: 98 F | WEIGHT: 132.06 LBS | RESPIRATION RATE: 20 BRPM | HEIGHT: 61 IN | DIASTOLIC BLOOD PRESSURE: 81 MMHG | SYSTOLIC BLOOD PRESSURE: 144 MMHG

## 2019-10-29 DIAGNOSIS — Z98.890 OTHER SPECIFIED POSTPROCEDURAL STATES: Chronic | ICD-10-CM

## 2019-10-29 DIAGNOSIS — G56.03 CARPAL TUNNEL SYNDROME, BILATERAL UPPER LIMBS: Chronic | ICD-10-CM

## 2019-10-29 DIAGNOSIS — Z90.49 ACQUIRED ABSENCE OF OTHER SPECIFIED PARTS OF DIGESTIVE TRACT: Chronic | ICD-10-CM

## 2019-10-29 PROCEDURE — 88305 TISSUE EXAM BY PATHOLOGIST: CPT | Mod: 26

## 2019-10-29 PROCEDURE — 88312 SPECIAL STAINS GROUP 1: CPT | Mod: 26

## 2019-10-29 RX ORDER — VERAPAMIL HCL 240 MG
1 CAPSULE, EXTENDED RELEASE PELLETS 24 HR ORAL
Qty: 0 | Refills: 0 | DISCHARGE

## 2019-10-29 NOTE — ASU DISCHARGE PLAN (ADULT/PEDIATRIC) - CALL YOUR DOCTOR IF YOU HAVE ANY OF THE FOLLOWING:
Nausea and vomiting that does not stop/Increased irritability or sluggishness/Unable to urinate/Inability to tolerate liquids or foods/Numbness, tingling, color or temperature change to extremity/Excessive diarrhea/Bleeding that does not stop/Wound/Surgical Site with redness, or foul smelling discharge or pus/Fever greater than (need to indicate Fahrenheit or Celsius)/Swelling that gets worse/Pain not relieved by Medications

## 2019-10-29 NOTE — ASU DISCHARGE PLAN (ADULT/PEDIATRIC) - CARE PROVIDER_API CALL
Jefe Auguste (DO)  Gastroenterology; Internal Medicine  [address] [place] [address]  Phone: [phone]  Fax: [phone]  Follow Up Time:

## 2019-10-29 NOTE — CHART NOTE - NSCHARTNOTEFT_GEN_A_CORE
PACU ANESTHESIA ADMISSION NOTE      Procedure: EGD with Bx  Post op diagnosis:  Gastritis    ____  Intubated  TV:______       Rate: ______      FiO2: ______    _x___  Patent Airway    _x___  Full return of protective reflexes    _x___  Full recovery from anesthesia / back to baseline status    Vitals:            T:    97.7            BP :    156/79            R:  20            Sat: 97%              P:76      Mental Status:  _x___ Awake   _____ Alert   _____ Drowsy   _____ Sedated    Nausea/Vomiting:  _x___  NO       ______Yes,   See Post - Op Orders         Pain Scale (0-10):  __0___    Treatment: _x___ None    ____ See Post - Op/PCA Orders    Post - Operative Fluids:   __x__ Oral   ____ See Post - Op Orders    Plan: Discharge:   _x___Home       _____Floor     _____Critical Care    _____  Other:_________________    Comments:  No anesthesia issues or complications noted.  Discharge when criteria met.

## 2019-10-29 NOTE — ASU PATIENT PROFILE, ADULT - PSH
Bilateral carpal tunnel syndrome  repair  H/O arthroscopy of left knee    History of appendectomy    S/P arthroscopy of right knee

## 2019-10-29 NOTE — H&P PST ADULT - NSICDXPASTSURGICALHX_GEN_ALL_CORE_FT
PAST SURGICAL HISTORY:  Bilateral carpal tunnel syndrome repair    H/O arthroscopy of left knee     History of appendectomy     S/P arthroscopy of right knee

## 2019-10-31 LAB — SURGICAL PATHOLOGY STUDY: SIGNIFICANT CHANGE UP

## 2019-11-01 DIAGNOSIS — K20.9 ESOPHAGITIS, UNSPECIFIED: ICD-10-CM

## 2019-11-01 DIAGNOSIS — K29.50 UNSPECIFIED CHRONIC GASTRITIS WITHOUT BLEEDING: ICD-10-CM

## 2019-11-01 DIAGNOSIS — I10 ESSENTIAL (PRIMARY) HYPERTENSION: ICD-10-CM

## 2019-11-01 DIAGNOSIS — R13.10 DYSPHAGIA, UNSPECIFIED: ICD-10-CM

## 2019-11-05 ENCOUNTER — EMERGENCY (EMERGENCY)
Facility: HOSPITAL | Age: 74
LOS: 0 days | Discharge: HOME | End: 2019-11-05
Attending: EMERGENCY MEDICINE | Admitting: EMERGENCY MEDICINE
Payer: MEDICARE

## 2019-11-05 VITALS
OXYGEN SATURATION: 98 % | HEART RATE: 100 BPM | TEMPERATURE: 98 F | DIASTOLIC BLOOD PRESSURE: 69 MMHG | SYSTOLIC BLOOD PRESSURE: 140 MMHG | RESPIRATION RATE: 20 BRPM

## 2019-11-05 DIAGNOSIS — Z98.890 OTHER SPECIFIED POSTPROCEDURAL STATES: Chronic | ICD-10-CM

## 2019-11-05 DIAGNOSIS — R11.0 NAUSEA: ICD-10-CM

## 2019-11-05 DIAGNOSIS — R42 DIZZINESS AND GIDDINESS: ICD-10-CM

## 2019-11-05 DIAGNOSIS — Z90.49 ACQUIRED ABSENCE OF OTHER SPECIFIED PARTS OF DIGESTIVE TRACT: Chronic | ICD-10-CM

## 2019-11-05 DIAGNOSIS — G56.03 CARPAL TUNNEL SYNDROME, BILATERAL UPPER LIMBS: Chronic | ICD-10-CM

## 2019-11-05 LAB
ALBUMIN SERPL ELPH-MCNC: 4.5 G/DL — SIGNIFICANT CHANGE UP (ref 3.5–5.2)
ALP SERPL-CCNC: 78 U/L — SIGNIFICANT CHANGE UP (ref 30–115)
ALT FLD-CCNC: 13 U/L — SIGNIFICANT CHANGE UP (ref 0–41)
ANION GAP SERPL CALC-SCNC: 13 MMOL/L — SIGNIFICANT CHANGE UP (ref 7–14)
AST SERPL-CCNC: 22 U/L — SIGNIFICANT CHANGE UP (ref 0–41)
BASOPHILS # BLD AUTO: 0.03 K/UL — SIGNIFICANT CHANGE UP (ref 0–0.2)
BASOPHILS NFR BLD AUTO: 0.3 % — SIGNIFICANT CHANGE UP (ref 0–1)
BILIRUB SERPL-MCNC: 0.4 MG/DL — SIGNIFICANT CHANGE UP (ref 0.2–1.2)
BUN SERPL-MCNC: 14 MG/DL — SIGNIFICANT CHANGE UP (ref 10–20)
CALCIUM SERPL-MCNC: 9.5 MG/DL — SIGNIFICANT CHANGE UP (ref 8.5–10.1)
CHLORIDE SERPL-SCNC: 103 MMOL/L — SIGNIFICANT CHANGE UP (ref 98–110)
CO2 SERPL-SCNC: 24 MMOL/L — SIGNIFICANT CHANGE UP (ref 17–32)
CREAT SERPL-MCNC: 0.7 MG/DL — SIGNIFICANT CHANGE UP (ref 0.7–1.5)
EOSINOPHIL # BLD AUTO: 0.07 K/UL — SIGNIFICANT CHANGE UP (ref 0–0.7)
EOSINOPHIL NFR BLD AUTO: 0.7 % — SIGNIFICANT CHANGE UP (ref 0–8)
GLUCOSE SERPL-MCNC: 150 MG/DL — HIGH (ref 70–99)
HCT VFR BLD CALC: 39.7 % — SIGNIFICANT CHANGE UP (ref 37–47)
HGB BLD-MCNC: 13 G/DL — SIGNIFICANT CHANGE UP (ref 12–16)
IMM GRANULOCYTES NFR BLD AUTO: 0.3 % — SIGNIFICANT CHANGE UP (ref 0.1–0.3)
LYMPHOCYTES # BLD AUTO: 1.48 K/UL — SIGNIFICANT CHANGE UP (ref 1.2–3.4)
LYMPHOCYTES # BLD AUTO: 14.8 % — LOW (ref 20.5–51.1)
MAGNESIUM SERPL-MCNC: 2.1 MG/DL — SIGNIFICANT CHANGE UP (ref 1.8–2.4)
MCHC RBC-ENTMCNC: 30.2 PG — SIGNIFICANT CHANGE UP (ref 27–31)
MCHC RBC-ENTMCNC: 32.7 G/DL — SIGNIFICANT CHANGE UP (ref 32–37)
MCV RBC AUTO: 92.3 FL — SIGNIFICANT CHANGE UP (ref 81–99)
MONOCYTES # BLD AUTO: 0.36 K/UL — SIGNIFICANT CHANGE UP (ref 0.1–0.6)
MONOCYTES NFR BLD AUTO: 3.6 % — SIGNIFICANT CHANGE UP (ref 1.7–9.3)
NEUTROPHILS # BLD AUTO: 8.01 K/UL — HIGH (ref 1.4–6.5)
NEUTROPHILS NFR BLD AUTO: 80.3 % — HIGH (ref 42.2–75.2)
NRBC # BLD: 0 /100 WBCS — SIGNIFICANT CHANGE UP (ref 0–0)
PLATELET # BLD AUTO: 166 K/UL — SIGNIFICANT CHANGE UP (ref 130–400)
POTASSIUM SERPL-MCNC: 4 MMOL/L — SIGNIFICANT CHANGE UP (ref 3.5–5)
POTASSIUM SERPL-SCNC: 4 MMOL/L — SIGNIFICANT CHANGE UP (ref 3.5–5)
PROT SERPL-MCNC: 7 G/DL — SIGNIFICANT CHANGE UP (ref 6–8)
RBC # BLD: 4.3 M/UL — SIGNIFICANT CHANGE UP (ref 4.2–5.4)
RBC # FLD: 13.9 % — SIGNIFICANT CHANGE UP (ref 11.5–14.5)
SODIUM SERPL-SCNC: 140 MMOL/L — SIGNIFICANT CHANGE UP (ref 135–146)
WBC # BLD: 9.98 K/UL — SIGNIFICANT CHANGE UP (ref 4.8–10.8)
WBC # FLD AUTO: 9.98 K/UL — SIGNIFICANT CHANGE UP (ref 4.8–10.8)

## 2019-11-05 PROCEDURE — 99284 EMERGENCY DEPT VISIT MOD MDM: CPT

## 2019-11-05 PROCEDURE — 70450 CT HEAD/BRAIN W/O DYE: CPT | Mod: 26

## 2019-11-05 RX ORDER — SODIUM CHLORIDE 9 MG/ML
1000 INJECTION, SOLUTION INTRAVENOUS ONCE
Refills: 0 | Status: COMPLETED | OUTPATIENT
Start: 2019-11-05 | End: 2019-11-05

## 2019-11-05 RX ORDER — MECLIZINE HCL 12.5 MG
25 TABLET ORAL ONCE
Refills: 0 | Status: COMPLETED | OUTPATIENT
Start: 2019-11-05 | End: 2019-11-05

## 2019-11-05 RX ADMIN — Medication 25 MILLIGRAM(S): at 13:47

## 2019-11-05 RX ADMIN — SODIUM CHLORIDE 1000 MILLILITER(S): 9 INJECTION, SOLUTION INTRAVENOUS at 13:47

## 2019-11-05 NOTE — ED PROVIDER NOTE - CLINICAL SUMMARY MEDICAL DECISION MAKING FREE TEXT BOX
pt in ER with c/o episodes of vertigo since this morning - spinning sensation.  normal neuro exam in ER.  head ct neg, labs ok.  pt re-evaluated, not further symptoms, wants to go home, doesn't want to stay for any further w/u, ambulating without difficulty in ER.  to d/c  home and pt to f/u with her neuro as outpt, told to return to ER if symptoms return or for any new/concerning symptoms.  pt understands and agrees with plan.

## 2019-11-05 NOTE — ED PROVIDER NOTE - PATIENT PORTAL LINK FT
You can access the FollowMyHealth Patient Portal offered by [facility] by registering at the following website: [email or web address]. By joining [employer]’s FollowMyHealth portal, you will also be able to view your health information using other applications (apps) compatible with our system.

## 2019-11-05 NOTE — ED PROVIDER NOTE - ATTENDING CONTRIBUTION TO CARE
73 y/o female with h/o HTN, anxiety, chronic HA's for 4 yrs, in ER with c/o vertigo.  Pt states this AM she woke up feeling very vertiginous, room spinning, assoc with mild N.  no vomiting.  no visual changes.  Pt states symptoms resolved, but since then she's been having episodes where she feels very off balance, as though she may fall - sensation lasts a few minutes and then resolves.  has happened ~ 4 times.  No fall.  denies any difficulty with speech.  no motor weakness or paresthesias.  chronic HA, but no change from baseline (follows with neuro, has had head CT and had MRI with contrast 'a while ago').  no cp/sob.  no abd pain.  no f/c.  PE - nad, nc/at, eomi, perrl, op - clear, neck supple, cta b/l, no w/r/r, rrr, abd- soft, nt/nd, nabs, from x 4, no LE tenderness/swelling, A&O x 3, cn 2-12 intact, no facial asymmetry, no dysarthria, motor 5/5 b/l UE and LE, no sensory deficits, no ataxia.  -check labs, head CT, re-eval. 75 y/o female with h/o HTN, anxiety, chronic HA's for 4 yrs, in ER with c/o vertigo.  Pt states this AM she woke up feeling very vertiginous, room spinning, assoc with mild N.  no vomiting.  no visual changes.  Pt states symptoms resolved, but since then she's been having episodes  - vertigo sensation lasts a few minutes and then resolves.  has happened ~ 4 times.  No fall.  denies any difficulty with speech.  no motor weakness or paresthesias.  chronic HA, but no change from baseline (follows with neuro, has had head CT and had MRI with contrast 'a while ago').  no cp/sob.  no abd pain.  no f/c.  PE - nad, nc/at, eomi, perrl, op - clear, neck supple, cta b/l, no w/r/r, rrr, abd- soft, nt/nd, nabs, from x 4, no LE tenderness/swelling, A&O x 3, cn 2-12 intact, no facial asymmetry, no dysarthria, motor 5/5 b/l UE and LE, no sensory deficits, no ataxia.  -check labs, head CT, re-eval.

## 2019-11-05 NOTE — ED PROVIDER NOTE - NS ED ROS FT
Review of Systems:  	•	CONSTITUTIONAL - no fever, no diaphoresis  	•	SKIN - no rash, no lesions  	•	HEMATOLOGIC - no bleeding, no bruising  	•	EYES - no discharge, no injection  	•	ENT - no otorrhea, no rhinorrhea  	•	RESPIRATORY - no shortness of breath, no cough  	•	CARDIAC - no chest pain, no palpitations  	•	GI - no abd pain, no nausea, no vomiting, no diarrhea  	•	GENITO-URINARY - no discharge, no dysuria, no hematuria  	•	MUSCULOSKELETAL - no joint paint, no swelling, no redness  	•	NEUROLOGIC - +dizziness, no weakness, no headache, no anesthesia, no paresthesias  	•	PSYCH - +anxiety

## 2019-11-05 NOTE — ED PROVIDER NOTE - OBJECTIVE STATEMENT
74yF pmhx HTN, anxiety, migraines accompanied by  c/o 5 episodes of dizziness that started after waking up this morning; states she was going about her normal routine when episodes began, feel like room is spinning around her, and resolve on their own without her taking any medication/doing anything different; reports starting CPAP last night for the first time. Reports hx of migraines for which she f/u w/ Dr Barbara harrison at [facility]; last CTH 08/2019, normal, has seen ENT who diagnosed her w/ chronic sinus issues. Denies fever/chills, v/d, headache.

## 2019-11-05 NOTE — ED PROVIDER NOTE - PROGRESS NOTE DETAILS
Romina - notified by nurse that pt experienced another episode of dizziness; seen at bedside tearful, upset. Says she's scared of sx and anxious, requesting home dose lorazepam 0.5mg as she takes one every 6 hrs and hasn't taken any since this AM. Romina - pt seen walking around, asking when she can leave, is hungry. Strict return precautions given that any return of dizziness she should come back immediately to ED.

## 2019-11-05 NOTE — ED PROVIDER NOTE - NSFOLLOWUPINSTRUCTIONS_ED_ALL_ED_FT
Please follow up with your primary care provider in 1-2 days for further evaluation. Return to the ED immediately if you experience any further symptoms.       Dizziness    Dizziness is a common problem. It is a feeling of unsteadiness or light-headedness. You may feel like you are about to faint. This condition can be caused by a number of things, including medicines, dehydration, or illness. Drink enough fluid to keep your urine clear or pale yellow. Do not drink alcohol and limit your caffeine and salt intake. Avoid quick movement.  Rise slowly from chairs and steady yourself until you feel okay. In the morning, first sit up on the side of the bed.    SEEK IMMEDIATE MEDICAL CARE IF YOU HAVE THE FOLLOWING SYMPTOMS: vomiting, changes in your vision or speech, weakness in your arms or legs, trouble speaking or swallowing, chest pain, abdominal pain, shortness of breath, sweating, bleeding, headache, neck pain, or fever.

## 2019-11-05 NOTE — ED PROVIDER NOTE - PHYSICAL EXAMINATION
Vital Signs: Reviewed  GEN: alert, NAD  HEAD:  normocephalic, atraumatic  EYES:  PERRLA, EOMI; conjunctivae without injection, drainage or discharge  ENMT:  nasal mucosa moist; mouth moist without ulcerations or lesions; throat moist without erythema, exudate, ulcerations or lesions  NECK:  supple, no masses  CARDIAC:  regular rate, normal S1 and S2, no murmurs, rubs or gallops  RESP:  respiratory rate and effort appear normal for age; lungs are clear to auscultation bilaterally; no rales or wheezes  ABDOMEN:  soft, nontender, nondistended  MUSCULOSKELETAL/NEURO: CNII-XII intact, no dysmetria, no dysdiadokochinesis, strength 5/5 b/l UE LE, gait normal, Rhomberg neg; normal movement, normal tone  SKIN:  normal skin color for age and race, well-perfused; warm and dry

## 2019-11-20 ENCOUNTER — APPOINTMENT (OUTPATIENT)
Dept: NEUROLOGY | Facility: CLINIC | Age: 74
End: 2019-11-20
Payer: MEDICARE

## 2019-11-20 VITALS
SYSTOLIC BLOOD PRESSURE: 149 MMHG | DIASTOLIC BLOOD PRESSURE: 91 MMHG | OXYGEN SATURATION: 99 % | WEIGHT: 137 LBS | HEIGHT: 62 IN | BODY MASS INDEX: 25.21 KG/M2 | HEART RATE: 76 BPM

## 2019-11-20 PROBLEM — F41.9 ANXIETY DISORDER, UNSPECIFIED: Chronic | Status: ACTIVE | Noted: 2019-11-05

## 2019-11-20 PROCEDURE — 99214 OFFICE O/P EST MOD 30 MIN: CPT

## 2019-11-21 NOTE — PHYSICAL EXAM
[FreeTextEntry1] : Neurologic Examination:\par NAD. AOx3.  Intact memory.  Speech fluent, nondysarthric.  CN 2 – 12 normal.  \par Strength 5/5 b/l UE/LE.  NL tone, bulk. No abnl movements.  DTRs 2+ throughout.  Plantar response downgoing b/l.  (-) Hoffmans, clonus.  Sensory intact LT/PP, pain, temp, proprioception and vibration.  NL FTN/HKS.  No dysdiadokinesia.  Gait narrow based/NL tandem.  \par \par PATIENT P/W DAILY PRESSURE pain in the right medial nasion right eye into the occiput on the right side.  NECK PAIN DOES NOT RADIATE.  \par \par

## 2019-11-21 NOTE — REVIEW OF SYSTEMS
[As Noted in HPI] : as noted in HPI [Eye Pain] : eye pain [Negative] : Heme/Lymph [FreeTextEntry4] : chronic sinusitis

## 2019-11-21 NOTE — HISTORY OF PRESENT ILLNESS
[FreeTextEntry1] : Since her last visit, Ms. Breen has had recurrent episodes of pressure type pain in the right medial nasion right eye into the occiput on the right side that occurs daily.  \par \par She has tried injections with pain management, Botox as well as other migraine medications.   \par \par Patient is taking meclizine, lorazepam, sertraline, nortriptyline, ketorolac, pantoprazole.

## 2019-11-21 NOTE — ASSESSMENT
[FreeTextEntry1] : Patient is a 74 year old woman with a history of migrainous headaches.  Patient has tried sumatriptan, meclizine and nortriptyline, as well as,  Aimovig but did not help.    \par \par Plan:\par 1. Start Topamax 25 mg QHS, Discussed side effects, medication must be taken daily, do not stop abruptly.\par 2. Start magnesium 400 mg daily\par 3. Return to office in 3-4 months

## 2019-12-18 ENCOUNTER — RX RENEWAL (OUTPATIENT)
Age: 74
End: 2019-12-18

## 2019-12-23 ENCOUNTER — APPOINTMENT (OUTPATIENT)
Dept: NEUROLOGY | Facility: CLINIC | Age: 74
End: 2019-12-23

## 2020-01-13 ENCOUNTER — RX RENEWAL (OUTPATIENT)
Age: 75
End: 2020-01-13

## 2020-01-15 NOTE — ED PROVIDER NOTE - CONSTITUTIONAL [-], MLM
"Patient Information Packet         Preferred Procedure:      [] Laparoscopic Adjustable Gastric Banding      [] Laparoscopic Roxanne-en-Y Gastric Bypass      []Revision-Previous Weight Loss Surgery    [x]Laparoscopic Sleeve Gastrectomy      Are you able to read, write and communicate in the English Language?   [x]YES    []NO  If not, what is your primary language? Please list any other barriers to communication, or special accommodations that you require:       Patient Information First Name:Cindy Thompson   YOB: 1991 Age: 28 y.o. Gender: female       What is your height?  5'3\"  How much do you weigh? 346  BMI:       If you need assistance walking, what device(s) do you use?       [x]Cane   []Walker   []Crutches   []Other:        Weight Loss History How long have you been overweight? 18 Years    How long have you been 35 pounds overweight? 2 Years   How long have you been 100 pounds or more overweight? 2 Years      When did you start dieting? 28  Age   Have you ever had a “stomach stapling” or other gastric restriction procedure?  [] Yes      [x] No         (If yes see surgical history.)     What is the most weight you have ever lost on a single diet? 100 lbs.   How did you lose the weight? \"Not eating, I should & exercising.\"      How long did you sustain the weight loss? 2 months    [] No diet attempts of any kind       Check all that apply: Unsupervised Diet Attempts:  [] NONE  []Body for Life/Leonel Rehman      [] High Protein                      [] Low Fat      [x] Cabbage Soup    [] Pritikin                      [] Estrella Diet      [] [facility]                       [x] Fasting   [] Maureen Gupta    [] Herbal Life                   [x] Calorie Counting             [] [place]    [] Geraldo Pastor                [] Sugar Busters    [] Dandre’s Diet     [x] Slim Fast    [] Health Spa    [] Low Carbohydrate                [] [place]     [] Keto  [] Other:        Supervised Diet Attempts:    " "[x] NONE [] Nutri-System   [] Overeaters Anonymous   [] Weight Watchers  [] Anne-Marie Gayle [] TOPS     [] Optifast   [] HMR   [] DASH    [] [place] Weight Loss  [] Diet Center   [] Other:       Over-the-Counter or Prescribed Medications for Weight Loss:   [] NONE [] Acutrim   [] Dexatrim   [] Ionamin/Adipex [] Phendiet  [] Prozac  [x] Wellbutrin  [] Amphetamines [] Didrex  [] Tenuate [] Phentrol []Redux   [] Byetta  [] Plegine  [] Sanorex  [] Meridia [] Xenical   [] Diuretics  []Pondimin  [] Phenteramine [] Fen-Phen,  # of months: 6      [] Other:             Behavioral Treatments for Weight Loss:  [x] NONE  Exercise:  []  NONE   [] Hospitalization   [] Hypnosis    [x] Walking or Running  [x] Stationary cycle or treadmill [] Physical Therapy    [] Psychological Therapy  [] Swimming [] Weight Training     [] Residential Programs     [] Other:    [] Team Sports  [] Other:         Eating Habits, Do you: Snack between meals?  [x] Yes   []No     Eat large meals? (gorge)     [x] Yes    [] No    Eat a lot of sweets?   []Yes [x] No    Drink carbonated beverages? [x] Yes     [] No    Drink caffeine-containing drinks?  [x] Yes   []No         ?If yes, how many cans/bottles per day?  4   ?If yes, how many cups per day? 4                    Drink soda pop?  [x] Yes   [] No     [] Diet   [] Regular      Have you used any of the following to control your weight?  (Check all that apply)      [] Binging and Purging  [] Binging followed by food restriction  [] Vomiting       [x] Excessive Exercise  [x] Excessive Calorie Restriction/Fasting     If so, when and how long was this period of behavior?    \"July 2019 x 1 mo          Do you currently force yourself to vomit after eating? [] Yes [x] No   Why do you feel you eat?  [x] Physical Hunger     [] Loneliness   [] Anxiousness           [] Makes me happy  [] Bored      What reasons do you feel contribute to your weight?  [] Over Consumption [x] Inactivity   [] Emotional Wellbeing       What " "else contributes to your weight struggle, i.e. how do you account for why you have been unable to lose weight and/or maintain?      Please tell us how your weight is interfering with your health and life? \"Being over weight makes me sleep more and found out that I have cancer because of my weight.\"     Why are you seeking weight loss surgery? \"I am seeking weight loss surgery because I have tried to lose weight & eating smaller portions.\"     Please tell us why you feel you can be successful with weight loss surgery, despite the extreme lifestyle and dietary changes required?  \"I feel like I would be successful at the surgery because Im tired of overeating. I want a change in my life being overweight slows me down I can't even run without getting dizzy so I feel like it will make me feel better.\"      If you use eating as an emotional outlet, what will you substitute when your eating is restricted?    \"I could get me a job, play games, take up a hobby I love doing just find different ways to keep myself busy instead of eating.\"      " no weight loss/no night sweats/no chills/no fever

## 2020-01-27 ENCOUNTER — APPOINTMENT (OUTPATIENT)
Dept: OTOLARYNGOLOGY | Facility: CLINIC | Age: 75
End: 2020-01-27
Payer: MEDICARE

## 2020-01-27 PROCEDURE — 31231 NASAL ENDOSCOPY DX: CPT

## 2020-01-27 PROCEDURE — 99213 OFFICE O/P EST LOW 20 MIN: CPT | Mod: 25

## 2020-01-27 NOTE — HISTORY OF PRESENT ILLNESS
[FreeTextEntry1] : Patient here today c/o facial pain for years.   She describes the pain as being behind her right eye and right side of nose. The pain is intermittent but occurs almost everyday. She states that nothing helps the pain. She has been treated for migraines including botox with no improvement. Patient admits nasal congestion on and off. Patient has tried nasal sprays in the past with no help. Pt has history of sinus disease and surgery. \par Patient also c/o throat discomfort and tongue discoloration. She states her tongue is a mustard color.  She scrubs her tongue with no impeovement. She feels a lump in her throat. She states excessive mucus worse at night. She has a sonogram scheduled for her neck tomorrow.

## 2020-01-27 NOTE — PROCEDURE
[Recalcitrant Symptoms] : recalcitrant symptoms  [Anterior rhinoscopy insufficient to account for symptoms] : anterior rhinoscopy insufficient to account for symptoms [Topical Lidocaine] : topical lidocaine [Oxymetazoline HCl] : oxymetazoline HCl [Flexible Endoscope] : examined with the flexible endoscope [Congested] : congested [Normal] : the middle meatus had no abnormalities [] : bilateral patent antrostomies [FreeTextEntry6] : The following anatomic sites were directly examined in a sequential fashion:\par The scope was introduced in the nasal passage between the middle and inferior turbinates to exam the inferior portion of the middle meatus and the fontanelle, as well as the maxillary ostia. Next, the scope was passed medically and posteriorly to the middle turbinates to examine the sphenoethmoid recess and the superior turbinate region.\par

## 2020-01-27 NOTE — REASON FOR VISIT
[Subsequent Evaluation] : a subsequent evaluation for [FreeTextEntry2] : facial pain, throat discomfort, tongue

## 2020-01-31 ENCOUNTER — APPOINTMENT (OUTPATIENT)
Dept: NEUROLOGY | Facility: CLINIC | Age: 75
End: 2020-01-31

## 2020-02-24 ENCOUNTER — APPOINTMENT (OUTPATIENT)
Dept: OTOLARYNGOLOGY | Facility: CLINIC | Age: 75
End: 2020-02-24
Payer: MEDICARE

## 2020-02-24 DIAGNOSIS — K14.8 OTHER DISEASES OF TONGUE: ICD-10-CM

## 2020-02-24 PROCEDURE — 99213 OFFICE O/P EST LOW 20 MIN: CPT | Mod: 25

## 2020-02-24 NOTE — REASON FOR VISIT
[Subsequent Evaluation] : a subsequent evaluation for [FreeTextEntry2] : chronic sinusitis, headache

## 2020-02-24 NOTE — HISTORY OF PRESENT ILLNESS
[FreeTextEntry1] : PAtient following up on headaches and chronic sinusitis. Patient was sent for sinus CT. Patient admits improvement with antibiotic. She still notices a tongue discoloration. Pt had headache spread from center of her Head outward. \par Patient also admits having a few episodes of dizziness. Patient notes room spinning. She has been to the ER a few times.

## 2020-03-04 ENCOUNTER — APPOINTMENT (OUTPATIENT)
Dept: OTOLARYNGOLOGY | Facility: CLINIC | Age: 75
End: 2020-03-04
Payer: MEDICARE

## 2020-03-04 DIAGNOSIS — J32.2 CHRONIC ETHMOIDAL SINUSITIS: ICD-10-CM

## 2020-03-04 PROCEDURE — 99213 OFFICE O/P EST LOW 20 MIN: CPT | Mod: 25

## 2020-03-04 PROCEDURE — 31231 NASAL ENDOSCOPY DX: CPT

## 2020-03-04 NOTE — PROCEDURE
[Topical Lidocaine] : topical lidocaine [Oxymetazoline HCl] : oxymetazoline HCl [Flexible Endoscope] : examined with the flexible endoscope [Congested] : congested [Nasal Mucosa] : bilateral purulence [Normal] : the nasopharynx was normal [FreeTextEntry6] : The following anatomic sites were directly examined in a sequential fashion:\par The scope was introduced in the nasal passage between the middle and inferior turbinates to exam the inferior portion of the middle meatus and the fontanelle, as well as the maxillary ostia. Next, the scope was passed medically and posteriorly to the middle turbinates to examine the sphenoethmoid recess and the superior turbinate region.\par

## 2020-03-04 NOTE — REASON FOR VISIT
[Subsequent Evaluation] : a subsequent evaluation for [FreeTextEntry2] : chronic sinusitis and headache

## 2020-03-04 NOTE — HISTORY OF PRESENT ILLNESS
[FreeTextEntry1] : Patient following up on chronic sinusitis. Patient admits seen in ER last night due to severe headache.  She was given pain medication and discharged. She has been treated for migraine by neurology and had no improvement with migraine medications including botox injections. She has had no improvement with meclizine, setraline, or nortriptyline.  Pain begins near corner of right eye and then radiates to the back of head. She has a h/o dizziness in the past. Recent abx and steroids had minimal if any effect.

## 2020-03-26 ENCOUNTER — APPOINTMENT (OUTPATIENT)
Dept: OTOLARYNGOLOGY | Facility: CLINIC | Age: 75
End: 2020-03-26

## 2020-03-31 ENCOUNTER — APPOINTMENT (OUTPATIENT)
Dept: NEUROLOGY | Facility: CLINIC | Age: 75
End: 2020-03-31
Payer: MEDICARE

## 2020-03-31 DIAGNOSIS — M54.2 CERVICALGIA: ICD-10-CM

## 2020-03-31 PROCEDURE — 99441: CPT

## 2020-04-29 ENCOUNTER — APPOINTMENT (OUTPATIENT)
Dept: OTOLARYNGOLOGY | Facility: CLINIC | Age: 75
End: 2020-04-29

## 2020-05-01 ENCOUNTER — OUTPATIENT (OUTPATIENT)
Dept: OUTPATIENT SERVICES | Facility: HOSPITAL | Age: 75
LOS: 1 days | End: 2020-05-01
Payer: MEDICARE

## 2020-05-01 DIAGNOSIS — Z90.49 ACQUIRED ABSENCE OF OTHER SPECIFIED PARTS OF DIGESTIVE TRACT: Chronic | ICD-10-CM

## 2020-05-01 DIAGNOSIS — Z98.890 OTHER SPECIFIED POSTPROCEDURAL STATES: Chronic | ICD-10-CM

## 2020-05-01 DIAGNOSIS — G56.03 CARPAL TUNNEL SYNDROME, BILATERAL UPPER LIMBS: Chronic | ICD-10-CM

## 2020-05-01 PROCEDURE — G9001: CPT

## 2020-05-27 ENCOUNTER — EMERGENCY (EMERGENCY)
Facility: HOSPITAL | Age: 75
LOS: 0 days | Discharge: HOME | End: 2020-05-27
Attending: EMERGENCY MEDICINE | Admitting: EMERGENCY MEDICINE
Payer: MEDICARE

## 2020-05-27 VITALS
OXYGEN SATURATION: 98 % | SYSTOLIC BLOOD PRESSURE: 131 MMHG | HEART RATE: 80 BPM | RESPIRATION RATE: 18 BRPM | TEMPERATURE: 98 F | DIASTOLIC BLOOD PRESSURE: 92 MMHG

## 2020-05-27 DIAGNOSIS — R42 DIZZINESS AND GIDDINESS: ICD-10-CM

## 2020-05-27 DIAGNOSIS — G56.03 CARPAL TUNNEL SYNDROME, BILATERAL UPPER LIMBS: Chronic | ICD-10-CM

## 2020-05-27 DIAGNOSIS — Z98.890 OTHER SPECIFIED POSTPROCEDURAL STATES: Chronic | ICD-10-CM

## 2020-05-27 DIAGNOSIS — Z90.49 ACQUIRED ABSENCE OF OTHER SPECIFIED PARTS OF DIGESTIVE TRACT: Chronic | ICD-10-CM

## 2020-05-27 LAB
ANION GAP SERPL CALC-SCNC: 14 MMOL/L — SIGNIFICANT CHANGE UP (ref 7–14)
BASOPHILS # BLD AUTO: 0.04 K/UL — SIGNIFICANT CHANGE UP (ref 0–0.2)
BASOPHILS NFR BLD AUTO: 0.5 % — SIGNIFICANT CHANGE UP (ref 0–1)
BUN SERPL-MCNC: 13 MG/DL — SIGNIFICANT CHANGE UP (ref 10–20)
CALCIUM SERPL-MCNC: 9.2 MG/DL — SIGNIFICANT CHANGE UP (ref 8.5–10.1)
CHLORIDE SERPL-SCNC: 107 MMOL/L — SIGNIFICANT CHANGE UP (ref 98–110)
CO2 SERPL-SCNC: 24 MMOL/L — SIGNIFICANT CHANGE UP (ref 17–32)
CREAT SERPL-MCNC: 0.7 MG/DL — SIGNIFICANT CHANGE UP (ref 0.7–1.5)
EOSINOPHIL # BLD AUTO: 0.19 K/UL — SIGNIFICANT CHANGE UP (ref 0–0.7)
EOSINOPHIL NFR BLD AUTO: 2.6 % — SIGNIFICANT CHANGE UP (ref 0–8)
GLUCOSE SERPL-MCNC: 129 MG/DL — HIGH (ref 70–99)
HCT VFR BLD CALC: 40 % — SIGNIFICANT CHANGE UP (ref 37–47)
HGB BLD-MCNC: 13.5 G/DL — SIGNIFICANT CHANGE UP (ref 12–16)
IMM GRANULOCYTES NFR BLD AUTO: 0.4 % — HIGH (ref 0.1–0.3)
LYMPHOCYTES # BLD AUTO: 2.06 K/UL — SIGNIFICANT CHANGE UP (ref 1.2–3.4)
LYMPHOCYTES # BLD AUTO: 28 % — SIGNIFICANT CHANGE UP (ref 20.5–51.1)
MCHC RBC-ENTMCNC: 31.3 PG — HIGH (ref 27–31)
MCHC RBC-ENTMCNC: 33.8 G/DL — SIGNIFICANT CHANGE UP (ref 32–37)
MCV RBC AUTO: 92.6 FL — SIGNIFICANT CHANGE UP (ref 81–99)
MONOCYTES # BLD AUTO: 0.37 K/UL — SIGNIFICANT CHANGE UP (ref 0.1–0.6)
MONOCYTES NFR BLD AUTO: 5 % — SIGNIFICANT CHANGE UP (ref 1.7–9.3)
NEUTROPHILS # BLD AUTO: 4.67 K/UL — SIGNIFICANT CHANGE UP (ref 1.4–6.5)
NEUTROPHILS NFR BLD AUTO: 63.5 % — SIGNIFICANT CHANGE UP (ref 42.2–75.2)
NRBC # BLD: 0 /100 WBCS — SIGNIFICANT CHANGE UP (ref 0–0)
PLATELET # BLD AUTO: 145 K/UL — SIGNIFICANT CHANGE UP (ref 130–400)
POTASSIUM SERPL-MCNC: 4.2 MMOL/L — SIGNIFICANT CHANGE UP (ref 3.5–5)
POTASSIUM SERPL-SCNC: 4.2 MMOL/L — SIGNIFICANT CHANGE UP (ref 3.5–5)
RBC # BLD: 4.32 M/UL — SIGNIFICANT CHANGE UP (ref 4.2–5.4)
RBC # FLD: 13.3 % — SIGNIFICANT CHANGE UP (ref 11.5–14.5)
SODIUM SERPL-SCNC: 145 MMOL/L — SIGNIFICANT CHANGE UP (ref 135–146)
WBC # BLD: 7.36 K/UL — SIGNIFICANT CHANGE UP (ref 4.8–10.8)
WBC # FLD AUTO: 7.36 K/UL — SIGNIFICANT CHANGE UP (ref 4.8–10.8)

## 2020-05-27 PROCEDURE — 71045 X-RAY EXAM CHEST 1 VIEW: CPT | Mod: 26

## 2020-05-27 PROCEDURE — 99285 EMERGENCY DEPT VISIT HI MDM: CPT

## 2020-05-27 PROCEDURE — 70450 CT HEAD/BRAIN W/O DYE: CPT | Mod: 26

## 2020-05-27 PROCEDURE — 93010 ELECTROCARDIOGRAM REPORT: CPT

## 2020-05-27 RX ORDER — MECLIZINE HCL 12.5 MG
1 TABLET ORAL
Qty: 40 | Refills: 0
Start: 2020-05-27 | End: 2020-06-05

## 2020-05-27 NOTE — ED PROVIDER NOTE - PATIENT PORTAL LINK FT
You can access the FollowMyHealth Patient Portal offered by [facility] by registering at the following website: [email or web address]. By joining [employer]’s FollowMyHealth portal, you will also be able to view your health information using other applications (apps) compatible with our system.

## 2020-05-27 NOTE — ED PROVIDER NOTE - PHYSICAL EXAMINATION
VITAL SIGNS: I have reviewed nursing notes and confirm.  CONSTITUTIONAL: Well-developed; well-nourished; in no acute distress.   SKIN:  skin exam is warm and dry, no acute rash.    HEAD: Normocephalic; atraumatic.  EYES: conjunctiva and sclera clear.  ENT: No nasal discharge; airway clear.  NECK: Supple; non tender.  CARD: S1, S2 normal; no murmurs, gallops, or rubs. Regular rate and rhythm.   RESP: No wheezes, rales or rhonchi.  ABD: Normal bowel sounds; soft; non-distended; non-tender  EXT: Normal ROM.  No clubbing, cyanosis or edema.   NEURO: ambulating well, steady gait, muscle strength 5/5 throughout, neg romberg Alert, oriented, grossly unremarkable

## 2020-05-27 NOTE — ED PROVIDER NOTE - NS ED ROS FT
Eyes:  No visual changes, eye pain or discharge.  ENMT:  No hearing changes, pain, discharge or infections. No neck pain or stiffness.  Cardiac:  No chest pain, SOB or edema  Respiratory:  No cough or respiratory distress. No hemoptysis. No history of asthma or RAD.  GI:  No nausea, vomiting, diarrhea or abdominal pain.  MS:  No myalgia, muscle weakness, joint pain or back pain.  Neuro:  + dizziness, No headache or weakness.  No LOC.  Skin:  No skin rash.   Endocrine: No history of thyroid disease or diabetes.  Except as documented in the HPI,  all other systems are negative.

## 2020-05-27 NOTE — ED PROVIDER NOTE - OBJECTIVE STATEMENT
Pt is a 73y/o female with a pmhx of HTN, Vertigo presents today for eval of dizziness yesterday while in shower lasting for approx 30 min. Pt sts she takes meclizine regularly. Pt denies fever, chills, HA, weakness, numbness, CP, SOB. Pt is a 73y/o female with a pmhx of HTN, Vertigo presents today for eval of dizziness yesterday while in shower lasting for approx 30 min. Pt sts she takes meclizine regularly. Pt denies fever, chills, HA, weakness, numbness, CP, SOB. Sudden onset, moderate, worse with head movement, self resolved

## 2020-05-27 NOTE — ED ADULT NURSE NOTE - NSIMPLEMENTINTERV_GEN_ALL_ED
Implemented All Universal Safety Interventions:  [place] to call system. Call bell, personal items and telephone within reach. Instruct patient to call for assistance. Room bathroom lighting operational. Non-slip footwear when patient is off stretcher. Physically safe environment: no spills, clutter or unnecessary equipment. Stretcher in lowest position, wheels locked, appropriate side rails in place.

## 2020-05-27 NOTE — ED ADULT NURSE NOTE - OBJECTIVE STATEMENT
came in c/o of an episode of dizziness which happens yesterday while on shower. Patient on Meclizine PO .

## 2020-05-27 NOTE — ED PROVIDER NOTE - ATTENDING CONTRIBUTION TO CARE
73yo F history of HTN vertigo migraines presenting with dizziness, room spinning sensation yesterday that self resolved. No fall/trauma. Went to urgent care today who recommended she come in for further eval. No sx since last night. Has prescription for meclizine already. No headache vision changes f/c/n/v/d, neck pain/stiffness, chest pain, shortness of breath.   Constitutional: Well appearing. No acute distress. Non toxic.   Eyes: PERRLA. Extraocular movements intact, no entrapment. Conjunctiva normal.   ENT: No nasal discharge. Moist mucus membranes.  Neck: Supple, non tender, full range of motion.  CV: RRR no murmurs, rubs, or gallops. +S1S2.   Pulm: Clear to auscultation bilaterally. Normal work of breathing.  Abd: soft NT ND +BS.   Ext: Warm and well perfused x4, moving all extremities, no edema.   Psy: Cooperative, appropriate.   Skin: Warm, dry, no rash  Neuro: CN2-12 grossly intact no sensory or motor deficits throughout, no drift, no ataxia, rapid alternating within normal limits, neg romberg.

## 2020-05-27 NOTE — ED ADULT TRIAGE NOTE - CHIEF COMPLAINT QUOTE
Patient with history of vertigo c/o room spinning dizziness last night  and was sent in from urgent care for further evaluation. Patient denies any CP N/V head ache fevers or chills at this time.

## 2020-05-27 NOTE — ED ADULT TRIAGE NOTE - HEART RATE (BEATS/MIN)
DATE OF PROCEDURE:  01/05/2018      PREOPERATIVE DIAGNOSES:     1.  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   2.  Nonunion of greater trochanteric fracture, right hip.      POSTOPERATIVE DIAGNOSES:     1.  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   2.  Nonunion of greater trochanteric fracture, right hip.      PROCEDURE:  Revision right total hip arthroplasty infected bone grafting of the acetabulum, cortical strut bone grafting of the right femur and open reduction and internal fixation of the right femur.      ASSISTANT:   MARGARET ESTES      INDICATIONS FOR PROCEDURE:  Mitch Gabriel is a 76-year-old male who is approximately 10 years out from a right total hip arthroplasty.  He has gone on to develop linear wear of polyethylene and acetabular osteolysis of femoral osteolysis.  He sustained a greater trochanteric fracture and this has gone on to nonunion.  I discussed treatment options with the patient.  I explained to him the risks, benefits, potential complications of the above stated procedure.  This discussion included but was not specific to infection and vascular neurologic complications, DVT, leg length inequality, instability of the hip, septic and aseptic loosening, nonunion, malunion, the possible need for further revision surgery.  After this discussion, patient wanted to proceed with surgery.      ANESTHESIA:  General.      PROCEDURE:  The patient was taken to the operating room and placed on the operating table in the supine position.  After adequate induction of a general anesthetic, he was transferred in the lateral position and held this way with the Abdul hip richmond.  Care was taken to pad all bony prominences.  An axillary roll was placed.  The patient was given 2 grams of Ancef for infection prophylaxis given 1 hour prior to incision.  We then performed a sterile prep and drape of the right hip and right lower  extremity.  After sterile prep and drape, the old incision was opened and proceeded with an anterolateral approach to the hip.  He had extensive scar tissue within the joint.  We did identify the abductors and took down the anterior one-third of the abductors were tagged and retracted them medially.  I then did a complete capsulectomy and synovectomy.  We were then able to dislocate the hip.  Upon dislocation of the hip, we then carefully removed the polyethylene liner and the femoral head.  Once the liner was removed we checked the locking mechanism and found it to be intact.  We then removed two screws from the acetabulum and bone grafted through the three screw holes in the central peg hole.  We used 30 mL of crushed crouton allograft.  We then put in a new polyethylene liner for a 36 mm head with a high wall E poly liner.  We then reduced the hip and found it to be very stable in full extension, external rotation, flexion, adduction and internal rotation was restoration of leg length and offset.  We then were able to reduce the greater trochanteric fracture anatomically and was held this way with a claw plate and cables were passed in the appropriate fashion and this allowed for rigid fixation.  It should be noted we also placed the cortical strut bone graft underneath the cables on the anterior aspect of the femur to treat the area of osteolysis.  DBX material was used for augmenting the ORIF of the trochanteric fracture and placement of the cortical strut.  After the cables were tightened we took the hip through a range of motion and the fixation was rigid and the reduction was anatomic.  We then soaked the hip with a dilute solution of Betadine for 3 minutes and irrigated using the pulsatile jet lavage used approximately 3 liters of antibiotic saline and pulsatile lavage.  I then repaired the abductors using #2 Ethibond.  We then closed the fascial layer using 0 Ethibond.  This was oversewn with Stratafix.   Then closed the deep subcutaneous using 0 Vicryl, superficial subcu was closed with 2-0 Vicryl and skin was closed with staples.  At each layer of closure,  the wound was copiously irrigated using antibiotic saline.  Sterile dressing was applied followed by an abduction pillow.  The patient tolerated the operative procedure.  There were no intraoperative complications.  The patient was sent to [facility] in stable condition.        Plan will be for the patient to be toe touch weightbearing for the next 6 weeks.  He will need to wear the abduction brace for 8 weeks.  No active or passive abduction of his hip.  He will get 24 hours of Ancef for infection prophylaxis, 30 days of aspirin for DVT prophylaxis.         HECTOR AVILES MD             D: 2018 16:55   T: 2018 11:01   MT: HUSSEIN#[id number]      Name:     JANAE VILLARREAL   MRN:      [id number]        Account:        [id number]   :      1941           Procedure Date: 2018      Document: [id number]     81

## 2020-05-27 NOTE — ED PROVIDER NOTE - NSFOLLOWUPINSTRUCTIONS_ED_ALL_ED_FT
Vertigo  Vertigo is the feeling that you or your surroundings are moving when they are not. Vertigo can be dangerous if it occurs while you are doing something that could endanger you or others, such as driving.    What are the causes?  This condition is caused by a disturbance in the signals that are sent by your body’s sensory systems to your brain. Different causes of a disturbance can lead to vertigo, including:  Infections, especially in the inner ear.  A bad reaction to a drug, or misuse of alcohol and medicines.  Withdrawal from drugs or alcohol.  Quickly changing positions, as when lying down or rolling over in bed.  Migraine headaches.  Decreased blood flow to the brain.  Decreased blood pressure.  Increased pressure in the brain from a head or neck injury, stroke, infection, tumor, or bleeding.  Central nervous system disorders.  What are the signs or symptoms?  Symptoms of this condition usually occur when you move your head or your eyes in different directions. Symptoms may start suddenly, and they usually last for less than a minute. Symptoms may include:  Loss of balance and falling.  Feeling like you are spinning or moving.  Feeling like your surroundings are spinning or moving.  Nausea and vomiting.  Blurred vision or double vision.  Difficulty hearing.  Slurred speech.  Dizziness.  Involuntary eye movement (nystagmus).  Symptoms can be mild and cause only slight annoyance, or they can be severe and interfere with daily life. Episodes of vertigo may return (recur) over time, and they are often triggered by certain movements. Symptoms may improve over time.    How is this diagnosed?  This condition may be diagnosed based on medical history and the quality of your nystagmus. Your health care provider may test your eye movements by asking you to quickly change positions to trigger the nystagmus. This may be called the Gambier-Hallpike test, head thrust test, or roll test. You may be referred to a health care provider who specializes in ear, nose, and throat (ENT) problems (otolaryngologist) or a provider who specializes in disorders of the central nervous system (neurologist).    You may have additional testing, including:  A physical exam.  Blood tests.  MRI.  A CT scan.  An electrocardiogram (ECG). This records electrical activity in your heart.  An electroencephalogram (EEG). This records electrical activity in your brain.  Hearing tests.  How is this treated?  Treatment for this condition depends on the cause and the severity of the symptoms. Treatment options include:  Medicines to treat nausea or vertigo. These are usually used for severe cases. Some medicines that are used to treat other conditions may also reduce or eliminate vertigo symptoms. These include:  Medicines that control allergies (antihistamines).  Medicines that control seizures (anticonvulsants).  Medicines that relieve depression (antidepressants).  Medicines that relieve anxiety (sedatives).  Head movements to adjust your inner ear back to normal. If your vertigo is caused by an ear problem, your health care provider may recommend certain movements to correct the problem.  Surgery. This is rare.  Follow these instructions at home:  Safety     Move slowly.Avoid sudden body or head movements.  Avoid driving.  Avoid operating heavy machinery.  Avoid doing any tasks that would cause danger to you or others if you would have a vertigo episode during the task.  If you have trouble walking or keeping your balance, try using a cane for stability. If you feel dizzy or unstable, sit down right away.  Return to your normal activities as told by your health care provider. Ask your health care provider what activities are safe for you.  General instructions     Take over-the-counter and prescription medicines only as told by your health care provider.  Avoid certain positions or movements as told by your health care provider.  Drink enough fluid to keep your urine clear or pale yellow.  Keep all follow-up visits as told by your health care provider. This is important.  Contact a health care provider if:  Your medicines do not relieve your vertigo or they make it worse.  You have a fever.  Your condition gets worse or you develop new symptoms.  Your family or friends notice any behavioral changes.  Your nausea or vomiting gets worse.  You have numbness or a “pins and needles” sensation in part of your body.  Get help right away if:  You have difficulty moving or speaking.  You are always dizzy.  You faint.  You develop severe headaches.  You have weakness in your hands, arms, or legs.  You have changes in your hearing or vision.  You develop a stiff neck.  You develop sensitivity to light.  This information is not intended to replace advice given to you by your health care provider. Make sure you discuss any questions you have with your health care provider.

## 2020-05-29 DIAGNOSIS — Z71.89 OTHER SPECIFIED COUNSELING: ICD-10-CM

## 2020-07-23 PROBLEM — R42 DIZZINESS AND GIDDINESS: Chronic | Status: ACTIVE | Noted: 2020-05-27

## 2020-07-27 ENCOUNTER — APPOINTMENT (OUTPATIENT)
Dept: OTOLARYNGOLOGY | Facility: CLINIC | Age: 75
End: 2020-07-27
Payer: MEDICARE

## 2020-07-27 ENCOUNTER — OUTPATIENT (OUTPATIENT)
Dept: OUTPATIENT SERVICES | Facility: HOSPITAL | Age: 75
LOS: 1 days | Discharge: HOME | End: 2020-07-27
Payer: MEDICARE

## 2020-07-27 DIAGNOSIS — K21.9 GASTRO-ESOPHAGEAL REFLUX DISEASE WITHOUT ESOPHAGITIS: ICD-10-CM

## 2020-07-27 DIAGNOSIS — Z98.890 OTHER SPECIFIED POSTPROCEDURAL STATES: Chronic | ICD-10-CM

## 2020-07-27 DIAGNOSIS — Z90.49 ACQUIRED ABSENCE OF OTHER SPECIFIED PARTS OF DIGESTIVE TRACT: Chronic | ICD-10-CM

## 2020-07-27 DIAGNOSIS — G56.03 CARPAL TUNNEL SYNDROME, BILATERAL UPPER LIMBS: Chronic | ICD-10-CM

## 2020-07-27 PROCEDURE — 74220 X-RAY XM ESOPHAGUS 1CNTRST: CPT | Mod: 26

## 2020-07-27 PROCEDURE — 31231 NASAL ENDOSCOPY DX: CPT

## 2020-07-27 PROCEDURE — 99214 OFFICE O/P EST MOD 30 MIN: CPT | Mod: 25

## 2020-07-27 NOTE — PROCEDURE
[Recalcitrant Symptoms] : recalcitrant symptoms  [Flexible Endoscope] : examined with the flexible endoscope [None] : none [Congested] : congested [Nasal Mucosa] : bilateral purulence [Normal] : the paranasal sinuses had no abnormalities

## 2020-07-27 NOTE — PHYSICAL EXAM
[Midline] : trachea located in midline position [Normal] : no rashes [de-identified] : edema  [Nasal Endoscopy Performed] : nasal endoscopy was performed, see procedure section for findings

## 2020-07-27 NOTE — HISTORY OF PRESENT ILLNESS
[FreeTextEntry1] : Patient following up on headaches and chronic sinusitis. Patient admits headaches are still present daily. She had CT scan in 2/2020. On her last office visit, patient instructed to see a pain management, however patient did not see physician. Patient continues to take Ibuprofen with relief. Patient has completed course of Prednisone as recommended and she states to have found temporary relief.

## 2020-09-03 ENCOUNTER — APPOINTMENT (OUTPATIENT)
Dept: OTOLARYNGOLOGY | Facility: CLINIC | Age: 75
End: 2020-09-03
Payer: MEDICARE

## 2020-09-03 PROCEDURE — 31231 NASAL ENDOSCOPY DX: CPT

## 2020-09-03 PROCEDURE — 99214 OFFICE O/P EST MOD 30 MIN: CPT | Mod: 25

## 2020-09-03 NOTE — REASON FOR VISIT
[Subsequent Evaluation] : a subsequent evaluation for [FreeTextEntry2] : headache, chronic sinusitis

## 2020-09-03 NOTE — PROCEDURE
[Recalcitrant Symptoms] : recalcitrant symptoms  [None] : none [Rigid Endoscope] : examined with a rigid endoscope [Congested] : congested [Normal] : the nasopharynx was normal

## 2020-09-03 NOTE — HISTORY OF PRESENT ILLNESS
[FreeTextEntry1] : Patient presents today following up on headaches and chronic sinusitis. Patient still continues to suffer from headaches. Minimal improvement. She was given anx and oral steroids at last visit.

## 2020-09-25 ENCOUNTER — RX RENEWAL (OUTPATIENT)
Age: 75
End: 2020-09-25

## 2020-10-14 ENCOUNTER — OUTPATIENT (OUTPATIENT)
Dept: OUTPATIENT SERVICES | Facility: HOSPITAL | Age: 75
LOS: 1 days | Discharge: HOME | End: 2020-10-14
Payer: MEDICARE

## 2020-10-14 VITALS
RESPIRATION RATE: 16 BRPM | HEART RATE: 60 BPM | OXYGEN SATURATION: 98 % | WEIGHT: 143.3 LBS | HEIGHT: 60 IN | TEMPERATURE: 97 F | DIASTOLIC BLOOD PRESSURE: 87 MMHG | SYSTOLIC BLOOD PRESSURE: 138 MMHG

## 2020-10-14 DIAGNOSIS — Z98.890 OTHER SPECIFIED POSTPROCEDURAL STATES: Chronic | ICD-10-CM

## 2020-10-14 DIAGNOSIS — J32.9 CHRONIC SINUSITIS, UNSPECIFIED: ICD-10-CM

## 2020-10-14 DIAGNOSIS — Z01.818 ENCOUNTER FOR OTHER PREPROCEDURAL EXAMINATION: ICD-10-CM

## 2020-10-14 DIAGNOSIS — Z90.49 ACQUIRED ABSENCE OF OTHER SPECIFIED PARTS OF DIGESTIVE TRACT: Chronic | ICD-10-CM

## 2020-10-14 DIAGNOSIS — G56.03 CARPAL TUNNEL SYNDROME, BILATERAL UPPER LIMBS: Chronic | ICD-10-CM

## 2020-10-14 LAB
ALBUMIN SERPL ELPH-MCNC: 4.3 G/DL — SIGNIFICANT CHANGE UP (ref 3.5–5.2)
ALP SERPL-CCNC: 70 U/L — SIGNIFICANT CHANGE UP (ref 30–115)
ALT FLD-CCNC: 12 U/L — SIGNIFICANT CHANGE UP (ref 0–41)
ANION GAP SERPL CALC-SCNC: 10 MMOL/L — SIGNIFICANT CHANGE UP (ref 7–14)
APTT BLD: 27.5 SEC — SIGNIFICANT CHANGE UP (ref 27–39.2)
AST SERPL-CCNC: 22 U/L — SIGNIFICANT CHANGE UP (ref 0–41)
BASOPHILS # BLD AUTO: 0.03 K/UL — SIGNIFICANT CHANGE UP (ref 0–0.2)
BASOPHILS NFR BLD AUTO: 0.5 % — SIGNIFICANT CHANGE UP (ref 0–1)
BILIRUB SERPL-MCNC: 0.4 MG/DL — SIGNIFICANT CHANGE UP (ref 0.2–1.2)
BUN SERPL-MCNC: 14 MG/DL — SIGNIFICANT CHANGE UP (ref 10–20)
CALCIUM SERPL-MCNC: 9 MG/DL — SIGNIFICANT CHANGE UP (ref 8.5–10.1)
CHLORIDE SERPL-SCNC: 109 MMOL/L — SIGNIFICANT CHANGE UP (ref 98–110)
CO2 SERPL-SCNC: 25 MMOL/L — SIGNIFICANT CHANGE UP (ref 17–32)
CREAT SERPL-MCNC: 0.7 MG/DL — SIGNIFICANT CHANGE UP (ref 0.7–1.5)
EOSINOPHIL # BLD AUTO: 0.3 K/UL — SIGNIFICANT CHANGE UP (ref 0–0.7)
EOSINOPHIL NFR BLD AUTO: 5 % — SIGNIFICANT CHANGE UP (ref 0–8)
GLUCOSE SERPL-MCNC: 95 MG/DL — SIGNIFICANT CHANGE UP (ref 70–99)
HCT VFR BLD CALC: 39.5 % — SIGNIFICANT CHANGE UP (ref 37–47)
HGB BLD-MCNC: 12.6 G/DL — SIGNIFICANT CHANGE UP (ref 12–16)
IMM GRANULOCYTES NFR BLD AUTO: 0.2 % — SIGNIFICANT CHANGE UP (ref 0.1–0.3)
INR BLD: 0.98 RATIO — SIGNIFICANT CHANGE UP (ref 0.65–1.3)
LYMPHOCYTES # BLD AUTO: 2.4 K/UL — SIGNIFICANT CHANGE UP (ref 1.2–3.4)
LYMPHOCYTES # BLD AUTO: 39.8 % — SIGNIFICANT CHANGE UP (ref 20.5–51.1)
MCHC RBC-ENTMCNC: 30 PG — SIGNIFICANT CHANGE UP (ref 27–31)
MCHC RBC-ENTMCNC: 31.9 G/DL — LOW (ref 32–37)
MCV RBC AUTO: 94 FL — SIGNIFICANT CHANGE UP (ref 81–99)
MONOCYTES # BLD AUTO: 0.33 K/UL — SIGNIFICANT CHANGE UP (ref 0.1–0.6)
MONOCYTES NFR BLD AUTO: 5.5 % — SIGNIFICANT CHANGE UP (ref 1.7–9.3)
NEUTROPHILS # BLD AUTO: 2.96 K/UL — SIGNIFICANT CHANGE UP (ref 1.4–6.5)
NEUTROPHILS NFR BLD AUTO: 49 % — SIGNIFICANT CHANGE UP (ref 42.2–75.2)
NRBC # BLD: 0 /100 WBCS — SIGNIFICANT CHANGE UP (ref 0–0)
PLATELET # BLD AUTO: 165 K/UL — SIGNIFICANT CHANGE UP (ref 130–400)
POTASSIUM SERPL-MCNC: 4.4 MMOL/L — SIGNIFICANT CHANGE UP (ref 3.5–5)
POTASSIUM SERPL-SCNC: 4.4 MMOL/L — SIGNIFICANT CHANGE UP (ref 3.5–5)
PROT SERPL-MCNC: 6.7 G/DL — SIGNIFICANT CHANGE UP (ref 6–8)
PROTHROM AB SERPL-ACNC: 11.3 SEC — SIGNIFICANT CHANGE UP (ref 9.95–12.87)
RBC # BLD: 4.2 M/UL — SIGNIFICANT CHANGE UP (ref 4.2–5.4)
RBC # FLD: 13.7 % — SIGNIFICANT CHANGE UP (ref 11.5–14.5)
SODIUM SERPL-SCNC: 144 MMOL/L — SIGNIFICANT CHANGE UP (ref 135–146)
WBC # BLD: 6.03 K/UL — SIGNIFICANT CHANGE UP (ref 4.8–10.8)
WBC # FLD AUTO: 6.03 K/UL — SIGNIFICANT CHANGE UP (ref 4.8–10.8)

## 2020-10-14 PROCEDURE — 93010 ELECTROCARDIOGRAM REPORT: CPT

## 2020-10-14 RX ORDER — METOPROLOL TARTRATE 50 MG
0 TABLET ORAL
Qty: 0 | Refills: 0 | DISCHARGE

## 2020-10-14 RX ORDER — PANTOPRAZOLE SODIUM 20 MG/1
1 TABLET, DELAYED RELEASE ORAL
Qty: 0 | Refills: 0 | DISCHARGE

## 2020-10-14 NOTE — H&P PST ADULT - HISTORY OF PRESENT ILLNESS
74 yo female presents with c/o sinus problems for past 5 years, w/ c/o inflammation, headaches& thick mucous, "this is my 3rd surgery"; pt is scheduled for sinus surgery  denies chest pain, palpitations, shortness of breath, dyspnea, or dysuria. exercise tolerance: 2 blocks/ flights of stairs w/o sob  pt denies any known exposure to COVID-19, denies any S&S  pt instructed re: self quarantine guidelines    Anesthesia Alert  NO--Difficult Airway  NO--History of neck surgery or radiation  NO--Limited ROM of neck  NO--History of Malignant hyperthermia  NO--No personal or family history of Pseudocholinesterase deficiency.  NO--Prior Anesthesia Complication  NO--Latex Allergy  NO--Loose teeth  NO--History of Rheumatoid Arthritis  YES--MONICA  NO--Other_____

## 2020-10-14 NOTE — H&P PST ADULT - NSICDXPASTSURGICALHX_GEN_ALL_CORE_FT
PAST SURGICAL HISTORY:  Bilateral carpal tunnel syndrome repair    H/O arthroscopy of left knee     H/O sinus surgery     History of appendectomy     S/P arthroscopy of right knee

## 2020-10-14 NOTE — H&P PST ADULT - NSICDXPASTMEDICALHX_GEN_ALL_CORE_FT
PAST MEDICAL HISTORY:  Anxiety     H/O gastroesophageal reflux (GERD)     Hypertension     Migraines     Vertigo

## 2020-10-14 NOTE — H&P PST ADULT - SUBSTANCE USE COMMENT, PROFILE
Try eating a high protein breakfast. I recommend nonfat greek yogurt every morning with a small amount of the trail mix or granola. I recommend eating an apple as a mid morning snack. You can add a little bit of peanut butter for the extra protein. For lunch I recommend a protein like fish, chicken, turkey breast, etc. If you are going to have a sandwich try whole grain bread and start with half a sandwich to see if that satiates you. You can put as many veggies on it as you want like onion, tomato, lettuce. Try mustard instead of villa because villa is high in fat. If you need a crunchy/salty side try pop chips or santiago chips. They are less fattening that potato chips. For a mid afternoon snack I recommend carrots with humus. Humus is so good for you and so filling due to the high protein. For dinner stick with the protein like fish, chicken or turkey. For starch I recommend quinoa, brown rice, sweet potato, or cous cous. Always eat a vegetable with your dinner. Spinach and kale are great because they fill you up and are full of vitamins and minerals. I know that its difficult to stop craving sweets in the evening. For this I recommend purple or green grapes because they will curb your craving. Another idea is any of those \"100 calorie\" desserts like skinny cow. Just make sure you only have one. Drink as much water as you possibly can all day long. Try adding fresh slices of lemon to curb cravings. Also purchase kellen seeds. If you sprinkle them on your food, they \"blow up\" in the stomach and make you feel anderson faster. I usually put in on yogurt or peanut butter toast. You can really put it on anything and you can find them pretty much everywhere these days. You should join [employer] at www.pinterest.com. They have an endless supply of healthy recipes.           Prediabetes: Care Instructions  Your Care Instructions  Prediabetes is a warning sign that you are at risk for getting type 2 diabetes. It means that your blood sugar is higher than it should be. The food you eat turns into sugar, which your body uses for energy. Normally, an organ called the pancreas makes insulin, which allows the sugar in your blood to get into your body's cells. But when your body can't use insulin the right way, the sugar doesn't move into cells. It stays in your blood instead. This is called insulin resistance. The buildup of sugar in the blood causes prediabetes. The good news is that lifestyle changes may help you get your blood sugar back to normal and help you avoid or delay diabetes. Follow-up care is a key part of your treatment and safety. Be sure to make and go to all appointments, and call your doctor if you are having problems. It's also a good idea to know your test results and keep a list of the medicines you take. How can you care for yourself at home? · Watch your weight. A healthy weight helps your body use insulin properly. · Limit the amount of calories, sweets, and unhealthy fat you eat. Ask your doctor if you should see a dietitian. A registered dietitian can help you create meal plans that fit your lifestyle. · Get at least 30 minutes of exercise on most days of the week. Exercise helps control your blood sugar. It also helps you maintain a healthy weight. Walking is a good choice. You also may want to do other activities, such as running, swimming, cycling, or playing tennis or team sports. · Do not smoke. Smoking can make prediabetes worse. If you need help quitting, talk to your doctor about stop-smoking programs and medicines. These can increase your chances of quitting for good. · If your doctor prescribed medicines, take them exactly as prescribed. Call your doctor if you think you are having a problem with your medicine. You will get more details on the specific medicines your doctor prescribes. When should you call for help?   Watch closely for changes in your health, and be sure to contact your doctor if:  · You have any symptoms of diabetes. These may include:  ¨ Being thirsty more often. ¨ Urinating more. ¨ Being hungrier. ¨ Losing weight. ¨ Being very tired. ¨ Having blurry vision. · You have a wound that will not heal.  · You have an infection that will not go away. · You have problems with your blood pressure. · You want more information about diabetes and how you can keep from getting it. Where can you learn more? Go to http://patricia-nan.info/. Enter I222 in the search box to learn more about \"Prediabetes: Care Instructions. \"  Current as of: May 23, 2016  Content Version: 11.1  © 8847-3388 [employer], [employer]. Care instructions adapted under license by [employer] (which disclaims liability or warranty for this information). If you have questions about a medical condition or this instruction, always ask your healthcare professional. [address] any warranty or liability for your use of this information. denies

## 2020-10-31 ENCOUNTER — OUTPATIENT (OUTPATIENT)
Dept: OUTPATIENT SERVICES | Facility: HOSPITAL | Age: 75
LOS: 1 days | Discharge: HOME | End: 2020-10-31

## 2020-10-31 ENCOUNTER — LABORATORY RESULT (OUTPATIENT)
Age: 75
End: 2020-10-31

## 2020-10-31 DIAGNOSIS — Z98.890 OTHER SPECIFIED POSTPROCEDURAL STATES: Chronic | ICD-10-CM

## 2020-10-31 DIAGNOSIS — G56.03 CARPAL TUNNEL SYNDROME, BILATERAL UPPER LIMBS: Chronic | ICD-10-CM

## 2020-10-31 DIAGNOSIS — Z90.49 ACQUIRED ABSENCE OF OTHER SPECIFIED PARTS OF DIGESTIVE TRACT: Chronic | ICD-10-CM

## 2020-10-31 DIAGNOSIS — Z11.59 ENCOUNTER FOR SCREENING FOR OTHER VIRAL DISEASES: ICD-10-CM

## 2020-10-31 PROBLEM — Z87.19 PERSONAL HISTORY OF OTHER DISEASES OF THE DIGESTIVE SYSTEM: Chronic | Status: ACTIVE | Noted: 2020-10-14

## 2020-11-03 ENCOUNTER — APPOINTMENT (OUTPATIENT)
Dept: OTOLARYNGOLOGY | Facility: AMBULATORY SURGERY CENTER | Age: 75
End: 2020-11-03

## 2020-11-09 ENCOUNTER — APPOINTMENT (OUTPATIENT)
Dept: OTOLARYNGOLOGY | Facility: CLINIC | Age: 75
End: 2020-11-09
Payer: MEDICARE

## 2020-11-09 VITALS — WEIGHT: 139 LBS | BODY MASS INDEX: 25.58 KG/M2 | HEIGHT: 62 IN

## 2020-11-09 PROCEDURE — 31231 NASAL ENDOSCOPY DX: CPT

## 2020-11-09 PROCEDURE — 99213 OFFICE O/P EST LOW 20 MIN: CPT | Mod: 25

## 2020-11-09 PROCEDURE — 99072 ADDL SUPL MATRL&STAF TM PHE: CPT

## 2020-11-09 NOTE — PHYSICAL EXAM
[Nasal Endoscopy Performed] : nasal endoscopy was performed, see procedure section for findings [Midline] : trachea located in midline position [Normal] : temporomandibular joint is normal

## 2020-11-09 NOTE — PROCEDURE
[Recalcitrant Symptoms] : recalcitrant symptoms  [Congested] : congested [Normal] : the paranasal sinuses had no abnormalities

## 2020-11-09 NOTE — HISTORY OF PRESENT ILLNESS
[FreeTextEntry1] : Patient is scheduled for surgery next Tuesday for Maxillary antrostomy with removal of contents, total ethmoidectomy sphenoidotomy with removal of contents, frontal sinusotomy with removal of contents with CT navigation.

## 2020-11-17 ENCOUNTER — APPOINTMENT (OUTPATIENT)
Dept: OTOLARYNGOLOGY | Facility: AMBULATORY SURGERY CENTER | Age: 75
End: 2020-11-17

## 2020-11-18 NOTE — ED ADULT TRIAGE NOTE - TEMPERATURE IN CELSIUS (DEGREES C)
Steff Jacob is a 62year old female presents today for follow-up on medical weight loss program for the treatment of overweight, obesity, or morbid obesity.     S:  Current weight Wt Readings from Last 6 Encounters:  11/18/20 : 174 lb (78.9 kg)  10/30/2 BMI 30.34 kg/m²   GENERAL: well developed, well nourished, in no apparent distress, obese  EYES: conjunctiva pink, sclera non icteric, PERRLA  LUNGS: CTA in all fields, breathing non labored  CARDIO: RRR without murmur, normal S1 and S2 without clicks or g fitness center with group fitness and personal training. Discount available as client of [facility].   · Health Coaching and Personal Training with Kimberli Horton at our [facility]- individual weekly coaching with option to add Tao Estrada  · Sugar, Salt & Fat by Ruddy Landin, Ph.D, R.D.   · The [employer]- Netflix Documentary on plant based nutrition  · Fed Up - documentary on sugar in our culture (Free on [facility])  · The Dr. Scotty Lizarraga by Dr. Grant Shaikh MD  · [address] [place] [address]  36.5

## 2020-12-18 NOTE — REVIEW OF SYSTEMS
Goal Outcome Evaluation:  Plan of Care Reviewed With: patient  Progress: improving  Outcome Summary: pain controlled much better today with PO, ambulating assist of 1, VSS, NVI, dressing c/d/i, plan to dc home tomorrow, educated on glucose meds and monitoring   [Patient Intake Form Reviewed] : Patient intake form was reviewed [As Noted in HPI] : as noted in HPI [Negative] : Heme/Lymph

## 2021-03-10 ENCOUNTER — APPOINTMENT (OUTPATIENT)
Dept: OTOLARYNGOLOGY | Facility: CLINIC | Age: 76
End: 2021-03-10
Payer: MEDICARE

## 2021-03-10 DIAGNOSIS — J32.9 CHRONIC SINUSITIS, UNSPECIFIED: ICD-10-CM

## 2021-03-10 PROCEDURE — 99213 OFFICE O/P EST LOW 20 MIN: CPT | Mod: 25

## 2021-03-10 PROCEDURE — 31231 NASAL ENDOSCOPY DX: CPT

## 2021-03-10 PROCEDURE — 99072 ADDL SUPL MATRL&STAF TM PHE: CPT

## 2021-03-10 NOTE — HISTORY OF PRESENT ILLNESS
[de-identified] : P [FreeTextEntry1] : Patient returns today following up on  chronic sinuitis.   Has been having nasal congestion. Can't  breath out of nose, causing her to be SOB. Was suppose to have ethmoidectomy , maxi antrostomy with removal of tissue , sphenoid , stereotactic comp. assisted on 11/17/20, procedure never performed

## 2021-03-10 NOTE — ASSESSMENT
[FreeTextEntry1] : I have reviewed the risk and benefits of balloon sinuplasty and turbinate reduction with the patient. They include but are not limited to bleeding, infection, recurrent symptoms and return to OR, change in taste and smell, injury to the orbit and brain, CSF leak, and need for postoperative treatment. \par \par

## 2021-03-10 NOTE — PROCEDURE
[Recalcitrant Symptoms] : recalcitrant symptoms  [Flexible Endoscope] : examined with the flexible endoscope [Congested] : congested [Normal] : the paranasal sinuses had no abnormalities

## 2021-04-26 ENCOUNTER — APPOINTMENT (OUTPATIENT)
Dept: OTOLARYNGOLOGY | Facility: CLINIC | Age: 76
End: 2021-04-26
Payer: MEDICARE

## 2021-04-26 PROCEDURE — 99072 ADDL SUPL MATRL&STAF TM PHE: CPT

## 2021-04-26 PROCEDURE — 30117 REMOVAL OF INTRANASAL LESION: CPT

## 2021-04-26 PROCEDURE — 30930 THER FX NASAL INF TURBINATE: CPT | Mod: 59

## 2021-04-26 PROCEDURE — 30802 ABLATE INF TURBINATE SUBMUC: CPT

## 2021-04-29 ENCOUNTER — APPOINTMENT (OUTPATIENT)
Dept: OTOLARYNGOLOGY | Facility: CLINIC | Age: 76
End: 2021-04-29

## 2021-05-05 ENCOUNTER — APPOINTMENT (OUTPATIENT)
Dept: OTOLARYNGOLOGY | Facility: CLINIC | Age: 76
End: 2021-05-05
Payer: MEDICARE

## 2021-05-05 PROCEDURE — 31237 NSL/SINS NDSC SURG BX POLYPC: CPT | Mod: 50,58

## 2021-05-05 PROCEDURE — 99024 POSTOP FOLLOW-UP VISIT: CPT

## 2021-05-05 NOTE — HISTORY OF PRESENT ILLNESS
[FreeTextEntry1] : Patient returns today following up on b/l  turbinate reduction and outfracture 4/26/21. Has to breathe deeply in order to breathe out of nose.

## 2021-05-05 NOTE — REASON FOR VISIT
[Subsequent Evaluation] : a subsequent evaluation for [FreeTextEntry2] : b/l  turbinate reduction and outfracture 4/26/21

## 2021-05-05 NOTE — PHYSICAL EXAM
[Nasal Endoscopy Performed] : nasal endoscopy was performed, see procedure section for findings [de-identified] : healign well  [de-identified] : crusting  [Normal] : mucosa is normal [Midline] : trachea located in midline position

## 2021-05-05 NOTE — PROCEDURE
[Debridement] : debridement  [None] : none [Rigid Endoscope] : examined with a rigid endoscope [Congested] : congested [Nasal Mucosa] : bilateral purulence [Normal] : the paranasal sinuses had no abnormalities [Moderate] : moderate [Prakash] : on both sides [Removed] : which was removed

## 2021-06-03 ENCOUNTER — APPOINTMENT (OUTPATIENT)
Dept: OTOLARYNGOLOGY | Facility: CLINIC | Age: 76
End: 2021-06-03
Payer: MEDICARE

## 2021-06-03 PROCEDURE — 31231 NASAL ENDOSCOPY DX: CPT | Mod: 58,78

## 2021-06-03 PROCEDURE — 99024 POSTOP FOLLOW-UP VISIT: CPT

## 2021-06-03 NOTE — PHYSICAL EXAM
[Nasal Endoscopy Performed] : nasal endoscopy was performed, see procedure section for findings [Normal] : no abnormal secretions [de-identified] : nasal valve collapse, improvement with Wheatland maneuver and lateralization

## 2021-06-03 NOTE — ASSESSMENT
[FreeTextEntry1] : nasal valve repair was discussed with Laterra and patient would like to proceed.

## 2021-06-03 NOTE — HISTORY OF PRESENT ILLNESS
[FreeTextEntry1] : Patient following up on nasal obstruction. Patient is s/o turb reduction and outfracture 4/26/21. patient admits mild  improvement with breathing. She stopped all nasal sprays.

## 2021-06-17 ENCOUNTER — APPOINTMENT (OUTPATIENT)
Dept: OTOLARYNGOLOGY | Facility: CLINIC | Age: 76
End: 2021-06-17
Payer: MEDICARE

## 2021-06-17 DIAGNOSIS — J02.9 ACUTE PHARYNGITIS, UNSPECIFIED: ICD-10-CM

## 2021-06-17 PROCEDURE — 31231 NASAL ENDOSCOPY DX: CPT | Mod: 58,52

## 2021-06-17 PROCEDURE — 99024 POSTOP FOLLOW-UP VISIT: CPT

## 2021-06-17 NOTE — PROCEDURE
[Recalcitrant Symptoms] : recalcitrant symptoms  [Rigid Endoscope] : examined with a rigid endoscope [Normal] : the paranasal sinuses had no abnormalities

## 2021-06-17 NOTE — HISTORY OF PRESENT ILLNESS
[FreeTextEntry1] : Patient present today following up on s/p nasal surgery. Patient is s/o turb reduction and outfracture 4/26/21. Has a white coat on tongue for two week. Was seen by gastro but not related.

## 2021-06-17 NOTE — ASSESSMENT
[FreeTextEntry1] : Latera procedure was described to patient including risks benefits and alternatives.

## 2021-06-22 ENCOUNTER — OUTPATIENT (OUTPATIENT)
Dept: OUTPATIENT SERVICES | Facility: HOSPITAL | Age: 76
LOS: 1 days | Discharge: HOME | End: 2021-06-22
Payer: MEDICARE

## 2021-06-22 ENCOUNTER — RESULT REVIEW (OUTPATIENT)
Age: 76
End: 2021-06-22

## 2021-06-22 VITALS
DIASTOLIC BLOOD PRESSURE: 96 MMHG | OXYGEN SATURATION: 98 % | HEIGHT: 62 IN | TEMPERATURE: 97 F | HEART RATE: 66 BPM | RESPIRATION RATE: 18 BRPM | WEIGHT: 134.48 LBS | SYSTOLIC BLOOD PRESSURE: 166 MMHG

## 2021-06-22 DIAGNOSIS — Z90.49 ACQUIRED ABSENCE OF OTHER SPECIFIED PARTS OF DIGESTIVE TRACT: Chronic | ICD-10-CM

## 2021-06-22 DIAGNOSIS — Z98.890 OTHER SPECIFIED POSTPROCEDURAL STATES: Chronic | ICD-10-CM

## 2021-06-22 DIAGNOSIS — S69.82XD OTHER SPECIFIED INJURIES OF LEFT WRIST, HAND AND FINGER(S), SUBSEQUENT ENCOUNTER: ICD-10-CM

## 2021-06-22 DIAGNOSIS — Z01.818 ENCOUNTER FOR OTHER PREPROCEDURAL EXAMINATION: ICD-10-CM

## 2021-06-22 DIAGNOSIS — G56.03 CARPAL TUNNEL SYNDROME, BILATERAL UPPER LIMBS: Chronic | ICD-10-CM

## 2021-06-22 LAB
ALBUMIN SERPL ELPH-MCNC: 4.6 G/DL — SIGNIFICANT CHANGE UP (ref 3.5–5.2)
ALP SERPL-CCNC: 84 U/L — SIGNIFICANT CHANGE UP (ref 30–115)
ALT FLD-CCNC: 12 U/L — SIGNIFICANT CHANGE UP (ref 0–41)
ANION GAP SERPL CALC-SCNC: 9 MMOL/L — SIGNIFICANT CHANGE UP (ref 7–14)
AST SERPL-CCNC: 21 U/L — SIGNIFICANT CHANGE UP (ref 0–41)
BASOPHILS # BLD AUTO: 0.03 K/UL — SIGNIFICANT CHANGE UP (ref 0–0.2)
BASOPHILS NFR BLD AUTO: 0.4 % — SIGNIFICANT CHANGE UP (ref 0–1)
BILIRUB SERPL-MCNC: 0.4 MG/DL — SIGNIFICANT CHANGE UP (ref 0.2–1.2)
BUN SERPL-MCNC: 15 MG/DL — SIGNIFICANT CHANGE UP (ref 10–20)
CALCIUM SERPL-MCNC: 9.7 MG/DL — SIGNIFICANT CHANGE UP (ref 8.5–10.1)
CHLORIDE SERPL-SCNC: 108 MMOL/L — SIGNIFICANT CHANGE UP (ref 98–110)
CO2 SERPL-SCNC: 28 MMOL/L — SIGNIFICANT CHANGE UP (ref 17–32)
CREAT SERPL-MCNC: 0.9 MG/DL — SIGNIFICANT CHANGE UP (ref 0.7–1.5)
EOSINOPHIL # BLD AUTO: 0.35 K/UL — SIGNIFICANT CHANGE UP (ref 0–0.7)
EOSINOPHIL NFR BLD AUTO: 4.6 % — SIGNIFICANT CHANGE UP (ref 0–8)
GLUCOSE SERPL-MCNC: 104 MG/DL — HIGH (ref 70–99)
HCT VFR BLD CALC: 39 % — SIGNIFICANT CHANGE UP (ref 37–47)
HGB BLD-MCNC: 12.9 G/DL — SIGNIFICANT CHANGE UP (ref 12–16)
IMM GRANULOCYTES NFR BLD AUTO: 0.3 % — SIGNIFICANT CHANGE UP (ref 0.1–0.3)
LYMPHOCYTES # BLD AUTO: 2.64 K/UL — SIGNIFICANT CHANGE UP (ref 1.2–3.4)
LYMPHOCYTES # BLD AUTO: 34.8 % — SIGNIFICANT CHANGE UP (ref 20.5–51.1)
MCHC RBC-ENTMCNC: 30.3 PG — SIGNIFICANT CHANGE UP (ref 27–31)
MCHC RBC-ENTMCNC: 33.1 G/DL — SIGNIFICANT CHANGE UP (ref 32–37)
MCV RBC AUTO: 91.5 FL — SIGNIFICANT CHANGE UP (ref 81–99)
MONOCYTES # BLD AUTO: 0.41 K/UL — SIGNIFICANT CHANGE UP (ref 0.1–0.6)
MONOCYTES NFR BLD AUTO: 5.4 % — SIGNIFICANT CHANGE UP (ref 1.7–9.3)
NEUTROPHILS # BLD AUTO: 4.14 K/UL — SIGNIFICANT CHANGE UP (ref 1.4–6.5)
NEUTROPHILS NFR BLD AUTO: 54.5 % — SIGNIFICANT CHANGE UP (ref 42.2–75.2)
NRBC # BLD: 0 /100 WBCS — SIGNIFICANT CHANGE UP (ref 0–0)
PLATELET # BLD AUTO: 161 K/UL — SIGNIFICANT CHANGE UP (ref 130–400)
POTASSIUM SERPL-MCNC: 3.9 MMOL/L — SIGNIFICANT CHANGE UP (ref 3.5–5)
POTASSIUM SERPL-SCNC: 3.9 MMOL/L — SIGNIFICANT CHANGE UP (ref 3.5–5)
PROT SERPL-MCNC: 6.8 G/DL — SIGNIFICANT CHANGE UP (ref 6–8)
RBC # BLD: 4.26 M/UL — SIGNIFICANT CHANGE UP (ref 4.2–5.4)
RBC # FLD: 13.5 % — SIGNIFICANT CHANGE UP (ref 11.5–14.5)
SODIUM SERPL-SCNC: 145 MMOL/L — SIGNIFICANT CHANGE UP (ref 135–146)
WBC # BLD: 7.59 K/UL — SIGNIFICANT CHANGE UP (ref 4.8–10.8)
WBC # FLD AUTO: 7.59 K/UL — SIGNIFICANT CHANGE UP (ref 4.8–10.8)

## 2021-06-22 PROCEDURE — 71046 X-RAY EXAM CHEST 2 VIEWS: CPT | Mod: 26

## 2021-06-22 PROCEDURE — 93010 ELECTROCARDIOGRAM REPORT: CPT

## 2021-06-22 RX ORDER — SERTRALINE 25 MG/1
1 TABLET, FILM COATED ORAL
Qty: 0 | Refills: 0 | DISCHARGE

## 2021-06-22 RX ORDER — PANTOPRAZOLE SODIUM 20 MG/1
1 TABLET, DELAYED RELEASE ORAL
Qty: 0 | Refills: 0 | DISCHARGE

## 2021-06-22 RX ORDER — METOPROLOL TARTRATE 50 MG
1 TABLET ORAL
Qty: 0 | Refills: 0 | DISCHARGE

## 2021-06-22 NOTE — H&P PST ADULT - HISTORY OF PRESENT ILLNESS
CURRENTLY  DENIES ANY CP, SOB,PALPITATIONS,COUGH OR DYSURIA  EXERCISE TOLERANCE 1/2 FOS WITHOUT SOB    AS PER PATIENT  this is his/her complete medical history including medications - PRESCRIPTIONS  OVER THE COUNTER MEDS    pt denies any covid s/s, or tested positive in the past.  Received covid vaccine MODERNA  pt advised self quarantine till day of procedure    Anesthesia Alert  NO--Difficult Airway  NO--History of neck surgery or radiation  NO--Limited ROM of neck  NO--History of Malignant hyperthermia  NO--No personal or family history of Pseudocholinesterase deficiency.  NO--Prior Anesthesia Complication  NO--Latex Allergy  NO--Loose teeth  NO--History of Rheumatoid Arthritis  NO--MONICA  NO--Other_____

## 2021-06-22 NOTE — H&P PST ADULT - REASON FOR ADMISSION
76 Y/O F SCHEDULED FOR PAST FOR LEFT WRIST ARTHROSCOPY DEBRIDEMENT POSSIBLE TRIANGULAR FIBROCARTILAGE COMPLEX REPAIR ON 7/19/21. PT C/O SUDDEN PAIN ABOUT 5 M AGO; TRIED BRACE WITHOUT RELIEF. DENIES PAIN AT THIS TIME.

## 2021-06-24 ENCOUNTER — APPOINTMENT (OUTPATIENT)
Dept: OTOLARYNGOLOGY | Facility: CLINIC | Age: 76
End: 2021-06-24

## 2021-07-16 ENCOUNTER — OUTPATIENT (OUTPATIENT)
Dept: OUTPATIENT SERVICES | Facility: HOSPITAL | Age: 76
LOS: 1 days | Discharge: HOME | End: 2021-07-16

## 2021-07-16 ENCOUNTER — LABORATORY RESULT (OUTPATIENT)
Age: 76
End: 2021-07-16

## 2021-07-16 DIAGNOSIS — Z90.49 ACQUIRED ABSENCE OF OTHER SPECIFIED PARTS OF DIGESTIVE TRACT: Chronic | ICD-10-CM

## 2021-07-16 DIAGNOSIS — Z11.59 ENCOUNTER FOR SCREENING FOR OTHER VIRAL DISEASES: ICD-10-CM

## 2021-07-16 DIAGNOSIS — Z98.890 OTHER SPECIFIED POSTPROCEDURAL STATES: Chronic | ICD-10-CM

## 2021-07-16 DIAGNOSIS — G56.03 CARPAL TUNNEL SYNDROME, BILATERAL UPPER LIMBS: Chronic | ICD-10-CM

## 2021-07-22 ENCOUNTER — APPOINTMENT (OUTPATIENT)
Dept: OTOLARYNGOLOGY | Facility: CLINIC | Age: 76
End: 2021-07-22
Payer: MEDICARE

## 2021-07-22 PROCEDURE — 31575 DIAGNOSTIC LARYNGOSCOPY: CPT | Mod: 58

## 2021-07-22 PROCEDURE — 99024 POSTOP FOLLOW-UP VISIT: CPT

## 2021-07-22 NOTE — HISTORY OF PRESENT ILLNESS
[FreeTextEntry1] :  Patient presents today with c/o choking. Patient states procedure from 4/2021 she c/o made her choking worse. Feels like she cant swallow. Pt has persistent obstruction. Pt has foam in the throat. Pt has tried abx and allergy meds with minimal improvement. Pt wakes up with phlegm in the morning.

## 2021-07-22 NOTE — PROCEDURE
[Normal] : the false vocal folds were pink and regular, the ventricular sulcus was open, the true vocal folds were glistening white, tense and of equal length, mobility, and height [True Vocal Cords Erythematous] : bilateral true vocal cord edema [Glottis Arytenoid Cartilages Erythema] : bilateral arytenoid ~M erythema [Interarytenoid Edema] : interarytenoid area edematous

## 2021-07-22 NOTE — PHYSICAL EXAM
[de-identified] : nasal valve collapse, improvement with Snohomish maneuver and lateralization  [Normal] : mucosa is normal [Midline] : trachea located in midline position

## 2021-08-05 ENCOUNTER — OUTPATIENT (OUTPATIENT)
Dept: OUTPATIENT SERVICES | Facility: HOSPITAL | Age: 76
LOS: 1 days | Discharge: HOME | End: 2021-08-05
Payer: MEDICARE

## 2021-08-05 VITALS
SYSTOLIC BLOOD PRESSURE: 149 MMHG | WEIGHT: 136.91 LBS | TEMPERATURE: 99 F | HEART RATE: 67 BPM | OXYGEN SATURATION: 96 % | RESPIRATION RATE: 14 BRPM | DIASTOLIC BLOOD PRESSURE: 87 MMHG | HEIGHT: 62 IN

## 2021-08-05 DIAGNOSIS — Z01.818 ENCOUNTER FOR OTHER PREPROCEDURAL EXAMINATION: ICD-10-CM

## 2021-08-05 DIAGNOSIS — S69.82XD OTHER SPECIFIED INJURIES OF LEFT WRIST, HAND AND FINGER(S), SUBSEQUENT ENCOUNTER: ICD-10-CM

## 2021-08-05 DIAGNOSIS — Z90.49 ACQUIRED ABSENCE OF OTHER SPECIFIED PARTS OF DIGESTIVE TRACT: Chronic | ICD-10-CM

## 2021-08-05 DIAGNOSIS — Z98.890 OTHER SPECIFIED POSTPROCEDURAL STATES: Chronic | ICD-10-CM

## 2021-08-05 DIAGNOSIS — G56.03 CARPAL TUNNEL SYNDROME, BILATERAL UPPER LIMBS: Chronic | ICD-10-CM

## 2021-08-05 PROCEDURE — 93010 ELECTROCARDIOGRAM REPORT: CPT

## 2021-08-05 RX ORDER — OMEPRAZOLE 10 MG/1
1 CAPSULE, DELAYED RELEASE ORAL
Qty: 0 | Refills: 0 | DISCHARGE

## 2021-08-05 RX ORDER — PANTOPRAZOLE SODIUM 20 MG/1
0 TABLET, DELAYED RELEASE ORAL
Qty: 0 | Refills: 3 | DISCHARGE

## 2021-08-05 RX ORDER — FLUTICASONE PROPIONATE 50 MCG
0 SPRAY, SUSPENSION NASAL
Qty: 0 | Refills: 3 | DISCHARGE

## 2021-08-05 RX ORDER — SERTRALINE 25 MG/1
1 TABLET, FILM COATED ORAL
Qty: 0 | Refills: 0 | DISCHARGE

## 2021-08-05 RX ORDER — METOPROLOL TARTRATE 50 MG
1 TABLET ORAL
Qty: 0 | Refills: 0 | DISCHARGE

## 2021-08-05 NOTE — H&P PST ADULT - LIVES WITH, PROFILE
Patient: Sharon Hutson Date: 10/3/2020   : 1952 Attending: Dario Horowitz MD   68 year old female Room: 358/01       Post-op Day #: 5    Surgical Procedure: S/P Right video-assisted thoracoscopy exploration, mediastinal lymph node biopsy, upper and middle lobectomy.    Hospital Course: Patient is a 68 year old female with Lung cancer who was brought to the OR by Dr. Horowitz for above procedure. Patient tolerated the procedure well. Day #1: Patient awake and upright in chair. Nauseated and had emesis this AM. Air leak noted. Hypertensive. Day #2: Patient feeling better. With air leak today. Adequate pain control. Day #3: Worsening ptx and subcutaneous. CHest tube still in chest wall but in poor position. CT chest tube placement by IR. Day #4: Brief period of SVT or atrial fib noted yesterday. Was given lasix and metoprolol. HR 60s this AM and sinus. Ptx is persistent but small, certainly not worsening. Air leak persists. Day #5: No acute events. Patient reports feeling well. Air leak persists    ALLERGIES:  No Known Allergies    Medications/Infusions:  Reviewed.    Physical Exam:   Vital     Temperature 97.8 °F (36.6 °C) (10/03/20 0700)    Pulse (!) 56 (10/03/20 0700)    Respiratory Rate (!) 21 (10/03/20 1104)    Non-Invasive   Blood Pressure 126/65 (10/03/20 0700)    Arterial  Blood Pressure (!) 150/68 (20 0950)    Pulse Oximetry 98 % (10/03/20 1000)           General: COMFORTABLE  HEENT: EOMI  Neurologic:  Alert and oriented x 3  Neck:  Supple  Chest:  Symmetric  Lungs:  decreased air movement  Heart:  normal rate and rhythm, S1, S2 present and no murmurs  Abdomen: bowel sounds decreased  Extremities: edema absent  Incision: Covered with minimal shadowing.    Intake/Output:    Intake/Output Summary (Last 24 hours) at 10/3/2020 1115  Last data filed at 10/3/2020 0845  Gross per 24 hour   Intake 1632 ml   Output 2475 ml   Net -843 ml       Laboratory Results:  Recent Labs   Lab  10/03/20  0559 10/02/20  0620   SODIUM 138 136   POTASSIUM 4.2 3.9   BUN 15 14   CREATININE 0.72 0.64   GLUCOSE 99 101*   WBC 5.6 6.2   HGB 11.5* 11.6*    284       Imaging:  Most recent imaging personally reviewed    Assessment/Plan:      1. Adenocarcinoma  2. S/P R VATS, R upper and middle lobectomy  3. Nausea and Vomiting  1. resolved  4. Postoperative blood loss anemia, expected  5. Atelectasis, post-op  6. Ptx  1. Cont O2 via NC  7. HTN  8. Chest tube maintenance  1. Cont suction    DVT Prophylaxis: SCD's and Heparin    Bed Status: Transfer to Floor    24 Hour Summary: Ptx stable. Air leak persists. No acute events    Pathology reviewed with patient   home health aide helps in the AM/alone

## 2021-08-05 NOTE — H&P PST ADULT - HISTORY OF PRESENT ILLNESS
76y Female presents today for presurgical testing for left wrist arthroscopy debridement possible triangular fibrocartilage complex repair. Patient states that approximately 6-7 months ago she began to feel a sharp pain in her left wrist worsening over time which she noted typically while doing light housework. She states that previous attempts to alleviate the pain with pharmacologic and physical therapy treatment were unsuccessful. A follow up MRI was done demonstrated tears in her tendon and currently wears a left wrist brace to limit her wrist movement. She reports that most of the pain in her 4th and 5th fingers when attempting to flex her fingers down towards her palm or when turning her wrist. She denies any episodes of injury or trauma.  Patient denies any CP, palpitations, SOB, cough, or dysuria. No recent URI or UTI.   Stated exercise tolerance is FOS <1           [place] screen reviewed    Patient denies any recent personal exposure to COVID19. Denies any sick contacts. Patient denies any travel within the past 30 days. Patient was instructed to quarantine until after procedure  +Moderna covid19 vaccine x2 doses 3/2021 & 4/2021    S69.82XD, 83251  Other specified injuries of left wrist, hand and finger(s), subsequent encounter  Encounter for other preprocedural examination  ^S69.82XD, 14871    PMH/PSH/FH  Hypertension  Migraines  Anxiety  Vertigo  H/O gastroesophageal reflux (GERD)  No significant past surgical history  Bilateral carpal tunnel syndrome  S/P arthroscopy of right knee  H/O arthroscopy of left knee  History of appendectomy  H/O sinus surgery    Anesthesia Alert  NO--Difficult Airway  NO--History of neck surgery or radiation  NO--Limited ROM of neck  NO--History of Malignant hyperthermia  NO--Personal or family history of Pseudocholinesterase deficiency.  NO--Prior Anesthesia Complication  NO--Latex Allergy  NO--Loose teeth, full denture upper   NO--History of Rheumatoid Arthritis  YES--[place] - USES CPAP MACHINE  NO--Bleeding risk  NO--Other_____    Patient states that this is their complete medical history and list of medications

## 2021-08-05 NOTE — H&P PST ADULT - NSICDXPASTMEDICALHX_GEN_ALL_CORE_FT
PAST MEDICAL HISTORY:  Anxiety     H/O gastroesophageal reflux (GERD)     Hard of hearing uses bilateral hearing aides    Hypertension     Migraines     Vertigo

## 2021-08-05 NOTE — H&P PST ADULT - NSICDXFAMILYHX_GEN_ALL_CORE_FT
FAMILY HISTORY:  Father  Still living? Unknown  FH: prostate cancer, Age at diagnosis: Age Unknown    Mother  Still living? No  FH: diabetes mellitus, Age at diagnosis: Age Unknown

## 2021-08-05 NOTE — H&P PST ADULT - ADDITIONAL PE
FROM neck, no loose teeth FROM neck, no loose teeth; left wrist brace in place. limited ability to flex 4th and 5th finger left wrist. Guarding when having fingers passively flexed/extended

## 2021-08-09 ENCOUNTER — OUTPATIENT (OUTPATIENT)
Dept: OUTPATIENT SERVICES | Facility: HOSPITAL | Age: 76
LOS: 1 days | Discharge: HOME | End: 2021-08-09

## 2021-08-09 ENCOUNTER — LABORATORY RESULT (OUTPATIENT)
Age: 76
End: 2021-08-09

## 2021-08-09 DIAGNOSIS — Z11.59 ENCOUNTER FOR SCREENING FOR OTHER VIRAL DISEASES: ICD-10-CM

## 2021-08-09 DIAGNOSIS — Z98.890 OTHER SPECIFIED POSTPROCEDURAL STATES: Chronic | ICD-10-CM

## 2021-08-09 DIAGNOSIS — G56.03 CARPAL TUNNEL SYNDROME, BILATERAL UPPER LIMBS: Chronic | ICD-10-CM

## 2021-08-09 DIAGNOSIS — Z90.49 ACQUIRED ABSENCE OF OTHER SPECIFIED PARTS OF DIGESTIVE TRACT: Chronic | ICD-10-CM

## 2021-08-09 PROBLEM — H91.90 UNSPECIFIED HEARING LOSS, UNSPECIFIED EAR: Chronic | Status: ACTIVE | Noted: 2021-08-05

## 2021-08-11 NOTE — ASU PATIENT PROFILE, ADULT - AS SC BRADEN FRICTION
CERTIFICATE OF WORK    January 2, 2020      Re:   Rocio Landa  [address]  [facility] [address]                        This is to certify that Rocio Landa has been under my care from 1/2/2020 and can return to regular work on 1/3/2020.    RESTRICTIONS: none      REMARKS: none        SIGNATURE:___________________________________________,   1/2/2020      Gurwinder Doty MD        [address]  [facility] [address]  [facility] [address]  [phone]  
(3) no apparent problem

## 2021-08-11 NOTE — ASU PATIENT PROFILE, ADULT - NS PRO MODE OF ARRIVAL
Well appearing, well nourished, awake, alert, oriented to person, place, time/situation and in no apparent distress. ambulatory normal...

## 2021-08-12 ENCOUNTER — OUTPATIENT (OUTPATIENT)
Dept: OUTPATIENT SERVICES | Facility: HOSPITAL | Age: 76
LOS: 1 days | Discharge: HOME | End: 2021-08-12

## 2021-08-12 VITALS
HEART RATE: 72 BPM | SYSTOLIC BLOOD PRESSURE: 157 MMHG | RESPIRATION RATE: 22 BRPM | DIASTOLIC BLOOD PRESSURE: 79 MMHG | OXYGEN SATURATION: 99 %

## 2021-08-12 VITALS
WEIGHT: 136.91 LBS | OXYGEN SATURATION: 100 % | HEIGHT: 62 IN | HEART RATE: 68 BPM | SYSTOLIC BLOOD PRESSURE: 159 MMHG | RESPIRATION RATE: 18 BRPM | DIASTOLIC BLOOD PRESSURE: 72 MMHG | TEMPERATURE: 98 F

## 2021-08-12 DIAGNOSIS — Z98.890 OTHER SPECIFIED POSTPROCEDURAL STATES: Chronic | ICD-10-CM

## 2021-08-12 DIAGNOSIS — Z90.49 ACQUIRED ABSENCE OF OTHER SPECIFIED PARTS OF DIGESTIVE TRACT: Chronic | ICD-10-CM

## 2021-08-12 DIAGNOSIS — G56.03 CARPAL TUNNEL SYNDROME, BILATERAL UPPER LIMBS: Chronic | ICD-10-CM

## 2021-08-12 RX ORDER — ONDANSETRON 8 MG/1
4 TABLET, FILM COATED ORAL ONCE
Refills: 0 | Status: DISCONTINUED | OUTPATIENT
Start: 2021-08-12 | End: 2021-08-26

## 2021-08-12 RX ORDER — SODIUM CHLORIDE 9 MG/ML
1000 INJECTION, SOLUTION INTRAVENOUS
Refills: 0 | Status: DISCONTINUED | OUTPATIENT
Start: 2021-08-12 | End: 2021-08-26

## 2021-08-12 RX ORDER — HYDROMORPHONE HYDROCHLORIDE 2 MG/ML
0.5 INJECTION INTRAMUSCULAR; INTRAVENOUS; SUBCUTANEOUS
Refills: 0 | Status: DISCONTINUED | OUTPATIENT
Start: 2021-08-12 | End: 2021-08-12

## 2021-08-12 RX ORDER — HYDROMORPHONE HYDROCHLORIDE 2 MG/ML
1 INJECTION INTRAMUSCULAR; INTRAVENOUS; SUBCUTANEOUS
Refills: 0 | Status: DISCONTINUED | OUTPATIENT
Start: 2021-08-12 | End: 2021-08-12

## 2021-08-12 RX ADMIN — SODIUM CHLORIDE 100 MILLILITER(S): 9 INJECTION, SOLUTION INTRAVENOUS at 12:18

## 2021-08-12 NOTE — BRIEF OPERATIVE NOTE - NSICDXBRIEFPOSTOP_GEN_ALL_CORE_FT
POST-OP DIAGNOSIS:  TFCC (triangular fibrocartilage complex) tear, left, initial encounter 12-Aug-2021 12:22:06  Birdie Gutiérrez

## 2021-08-12 NOTE — BRIEF OPERATIVE NOTE - NSICDXBRIEFPROCEDURE_GEN_ALL_CORE_FT
PROCEDURES:  Arthroscopic debridement of wrist 12-Aug-2021 12:21:47 left Birdie Gutiérrez   Scheduled For Follow Up In (Optional): 4-6 weeks Detail Level: Simple Scheduled For Follow Up In (Optional): 1 year

## 2021-08-12 NOTE — BRIEF OPERATIVE NOTE - NSICDXBRIEFPREOP_GEN_ALL_CORE_FT
PRE-OP DIAGNOSIS:  TFCC (triangular fibrocartilage complex) tear, left, initial encounter 12-Aug-2021 12:22:02  Birdie Gutiérrez

## 2021-08-12 NOTE — ASU DISCHARGE PLAN (ADULT/PEDIATRIC) - CARE PROVIDER_API CALL
Birdie Gutiérrez  SURGERY OF THE HAND  [address] [place] [address]  Phone: [phone]  Fax: [phone]  Follow Up Time:

## 2021-08-12 NOTE — CHART NOTE - NSCHARTNOTEFT_GEN_A_CORE
PACU ANESTHESIA ADMISSION NOTE      Procedure: Arthroscopic debridement of wrist  left      Post op diagnosis:  TFCC (triangular fibrocartilage complex) tear, left, initial encounter        ____  Intubated  TV:______       Rate: ______      FiO2: ______    __x__  Patent Airway    __x__  Full return of protective reflexes    __x__  Full recovery from anesthesia / back to baseline status    Vitals:  T(C): 36.9 (08-12-21 @ 10:08), Max: 36.9 (08-12-21 @ 10:08)  HR: 68 (08-12-21 @ 10:08) (68 - 68)  BP: 159/72 (08-12-21 @ 10:08) (159/72 - 159/72)  RR: 18 (08-12-21 @ 10:08) (18 - 18)  SpO2: 100% (08-12-21 @ 10:08) (100% - 100%)    Mental Status:  __x__ Awake   ___x__ Alert   _____ Drowsy   _____ Sedated    Nausea/Vomiting:  __x__ NO  ______Yes,   See Post - Op Orders          Pain Scale (0-10):  _0____    Treatment: ____ None    __x__ See Post - Op/PCA Orders    Post - Operative Fluids:   ____ Oral   __x__ See Post - Op Orders    Plan: Discharge:   __x__Home       _____Floor     _____Critical Care    _____  Other:_________________    Comments: Patient had smooth intraoperative event, no anesthesia complication.  PACU Vital signs: HR:  83          BP:     159   /    89      RR:     18        O2 Sat:  98     %     Temp 36
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
## 2021-08-17 DIAGNOSIS — I10 ESSENTIAL (PRIMARY) HYPERTENSION: ICD-10-CM

## 2021-08-17 DIAGNOSIS — R42 DIZZINESS AND GIDDINESS: ICD-10-CM

## 2021-08-17 DIAGNOSIS — S63.592A OTHER SPECIFIED SPRAIN OF LEFT WRIST, INITIAL ENCOUNTER: ICD-10-CM

## 2021-08-17 DIAGNOSIS — F41.9 ANXIETY DISORDER, UNSPECIFIED: ICD-10-CM

## 2021-08-17 DIAGNOSIS — G43.909 MIGRAINE, UNSPECIFIED, NOT INTRACTABLE, WITHOUT STATUS MIGRAINOSUS: ICD-10-CM
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
## 2021-09-09 ENCOUNTER — APPOINTMENT (OUTPATIENT)
Dept: OTOLARYNGOLOGY | Facility: CLINIC | Age: 76
End: 2021-09-09
Payer: MEDICARE

## 2021-09-09 DIAGNOSIS — J34.89 OTHER SPECIFIED DISORDERS OF NOSE AND NASAL SINUSES: ICD-10-CM

## 2021-09-09 PROCEDURE — 30468Z: CUSTOM

## 2021-09-30 ENCOUNTER — APPOINTMENT (OUTPATIENT)
Dept: OTOLARYNGOLOGY | Facility: CLINIC | Age: 76
End: 2021-09-30
Payer: MEDICARE

## 2021-09-30 PROCEDURE — 31231 NASAL ENDOSCOPY DX: CPT

## 2021-09-30 PROCEDURE — 99214 OFFICE O/P EST MOD 30 MIN: CPT | Mod: 25

## 2021-09-30 NOTE — HISTORY OF PRESENT ILLNESS
[FreeTextEntry1] : Patient following up on nasal obstruction. Patient is s/p repair of nasal valve collapse with latera. Patient admits breathing worsening. Nasal obstruction. Breathing through her mouth.

## 2021-09-30 NOTE — PROCEDURE
[Rigid Endoscope] : examined with a rigid endoscope [Normal] : posterior cricoid area had healthy pink mucosa in the interarytenoid area and the esophageal inlet

## 2021-10-04 ENCOUNTER — EMERGENCY (EMERGENCY)
Facility: HOSPITAL | Age: 76
LOS: 0 days | Discharge: HOME | End: 2021-10-04
Attending: STUDENT IN AN ORGANIZED HEALTH CARE EDUCATION/TRAINING PROGRAM | Admitting: STUDENT IN AN ORGANIZED HEALTH CARE EDUCATION/TRAINING PROGRAM
Payer: MEDICARE

## 2021-10-04 VITALS
OXYGEN SATURATION: 96 % | TEMPERATURE: 98 F | HEIGHT: 62 IN | WEIGHT: 132.94 LBS | HEART RATE: 74 BPM | SYSTOLIC BLOOD PRESSURE: 185 MMHG | RESPIRATION RATE: 18 BRPM | DIASTOLIC BLOOD PRESSURE: 92 MMHG

## 2021-10-04 DIAGNOSIS — K13.70 UNSPECIFIED LESIONS OF ORAL MUCOSA: ICD-10-CM

## 2021-10-04 DIAGNOSIS — F41.9 ANXIETY DISORDER, UNSPECIFIED: ICD-10-CM

## 2021-10-04 DIAGNOSIS — G56.03 CARPAL TUNNEL SYNDROME, BILATERAL UPPER LIMBS: Chronic | ICD-10-CM

## 2021-10-04 DIAGNOSIS — Z98.890 OTHER SPECIFIED POSTPROCEDURAL STATES: Chronic | ICD-10-CM

## 2021-10-04 DIAGNOSIS — I10 ESSENTIAL (PRIMARY) HYPERTENSION: ICD-10-CM

## 2021-10-04 DIAGNOSIS — Z90.49 ACQUIRED ABSENCE OF OTHER SPECIFIED PARTS OF DIGESTIVE TRACT: Chronic | ICD-10-CM

## 2021-10-04 DIAGNOSIS — Z88.8 ALLERGY STATUS TO OTHER DRUGS, MEDICAMENTS AND BIOLOGICAL SUBSTANCES: ICD-10-CM

## 2021-10-04 PROCEDURE — 99283 EMERGENCY DEPT VISIT LOW MDM: CPT

## 2021-10-04 NOTE — ED PROVIDER NOTE - PHYSICAL EXAMINATION
Physical Exam    Vital Signs: I have reviewed the initial vital signs.  Constitutional: well-nourished, appears stated age, no acute distress  HEENT: PERRL, EOMI. Mouth: + small abrasion to left side of tongue. No "white coating" noted  Skin: warm, dry  Neuro: AOx3, No focal deficits noted

## 2021-10-04 NOTE — ED PROVIDER NOTE - CARE PROVIDER_API CALL
Sammy John (MD)  [facility]  [address] [facility], [address] [place] [address]  Phone: [phone]  Fax: [phone]  Follow Up Time: 4-6 Days

## 2021-10-04 NOTE — ED PROVIDER NOTE - ATTENDING CONTRIBUTION TO CARE
75 yo F w/ hx of HTN, anxiety p/w mouth discharge x 1 month since her ENT placed something into her nares to dilate them. Patient endorses intermittent episodes of discharge, and tongue burning sensation x 1 month. Has seen ENT since who prescribed steroids and a PPI but patient has not taken them. Denies SOB or difficulty swallowing.     Exam: Anxious on assessment. No acute distress. No visible discharge on assessment, gums and tongue pink and healthy, no sores. No thrush. Speaking full sentences without change in voice.    #reaction to object placed in nares vs GERD vs anxiety.     plan: follow up with ENT and dental (has appointment this week).

## 2021-10-04 NOTE — ED PROVIDER NOTE - CLINICAL SUMMARY MEDICAL DECISION MAKING FREE TEXT BOX
75 yo F w/ hx of HTN, anxiety p/w mouth discharge x 1 month. No discharge, sores on exam. Gingiva and tongue appear healthy on assessment.

## 2021-10-04 NOTE — ED PROVIDER NOTE - OBJECTIVE STATEMENT
77 yo F hx of "nervousness", HTN c/o white stuff to tongue and burning sensation to mouth x 3 months.  She was seen by ENT last week and was started on some medications that she didn't take due to her BP being high. No fevers.  NO URI symptoms.

## 2021-10-04 NOTE — ED PROVIDER NOTE - PATIENT PORTAL LINK FT
You can access the FollowMyHealth Patient Portal offered by [facility] by registering at the following website: [email or web address]. By joining [employer]’s FollowMyHealth portal, you will also be able to view your health information using other applications (apps) compatible with our system.

## 2021-10-13 ENCOUNTER — EMERGENCY (EMERGENCY)
Facility: HOSPITAL | Age: 76
LOS: 0 days | Discharge: HOME | End: 2021-10-13
Attending: STUDENT IN AN ORGANIZED HEALTH CARE EDUCATION/TRAINING PROGRAM | Admitting: PHYSICIAN ASSISTANT
Payer: MEDICARE

## 2021-10-13 VITALS
WEIGHT: 130.07 LBS | TEMPERATURE: 99 F | DIASTOLIC BLOOD PRESSURE: 82 MMHG | RESPIRATION RATE: 18 BRPM | OXYGEN SATURATION: 96 % | SYSTOLIC BLOOD PRESSURE: 146 MMHG | HEART RATE: 87 BPM | HEIGHT: 62 IN

## 2021-10-13 DIAGNOSIS — Z98.890 OTHER SPECIFIED POSTPROCEDURAL STATES: Chronic | ICD-10-CM

## 2021-10-13 DIAGNOSIS — G56.03 CARPAL TUNNEL SYNDROME, BILATERAL UPPER LIMBS: Chronic | ICD-10-CM

## 2021-10-13 DIAGNOSIS — R06.02 SHORTNESS OF BREATH: ICD-10-CM

## 2021-10-13 DIAGNOSIS — Z88.8 ALLERGY STATUS TO OTHER DRUGS, MEDICAMENTS AND BIOLOGICAL SUBSTANCES STATUS: ICD-10-CM

## 2021-10-13 DIAGNOSIS — I10 ESSENTIAL (PRIMARY) HYPERTENSION: ICD-10-CM

## 2021-10-13 DIAGNOSIS — R05.3 CHRONIC COUGH: ICD-10-CM

## 2021-10-13 DIAGNOSIS — K21.9 GASTRO-ESOPHAGEAL REFLUX DISEASE WITHOUT ESOPHAGITIS: ICD-10-CM

## 2021-10-13 DIAGNOSIS — K14.8 OTHER DISEASES OF TONGUE: ICD-10-CM

## 2021-10-13 DIAGNOSIS — Z90.49 ACQUIRED ABSENCE OF OTHER SPECIFIED PARTS OF DIGESTIVE TRACT: Chronic | ICD-10-CM

## 2021-10-13 DIAGNOSIS — R05.1 ACUTE COUGH: ICD-10-CM

## 2021-10-13 DIAGNOSIS — F41.9 ANXIETY DISORDER, UNSPECIFIED: ICD-10-CM

## 2021-10-13 PROCEDURE — 99283 EMERGENCY DEPT VISIT LOW MDM: CPT

## 2021-10-13 RX ORDER — NYSTATIN 500MM UNIT
4 POWDER (EA) MISCELLANEOUS
Qty: 112 | Refills: 0
Start: 2021-10-13 | End: 2021-10-19

## 2021-10-13 NOTE — ED PROVIDER NOTE - NSFOLLOWUPINSTRUCTIONS_ED_ALL_ED_FT
IT IS IMPORTANT TO START THE MEDICATIONS THAT WERE PRESCRIBED TO YOU BY DR. BROWNE, 2 WEEKS AGO, PANTOPRAZOLE AND FAMOTIDINE. PANTOPRAZOLE OFTEN CAN TAKE UP TO 2 WEEKS TO WORK, SO PLEASE START IT AND TAKE DAILY.       KEEP YOUR APPOINTMENT WITH DR. BROWNE NEXT WEEK.

## 2021-10-13 NOTE — ED PROVIDER NOTE - NS ED ROS FT
CONST: No fever, chills or bodyaches  EYES: No pain, redness, drainage or visual changes.  ENT: see HPI  CARD: No chest pain, palpitations  RESP: No SOB, + cough, dry  GI: No abdominal pain, N/V/D  MS: No joint pain, back pain or extremity pain/injury  SKIN: No rashes

## 2021-10-13 NOTE — ED PROVIDER NOTE - PATIENT PORTAL LINK FT
You can access the FollowMyHealth Patient Portal offered by [facility] by registering at the following website: [email or web address]. By joining [email or web address]’s FollowMyHealth portal, you will also be able to view your health information using other applications (apps) compatible with our system.

## 2021-10-13 NOTE — ED PROVIDER NOTE - ATTENDING CONTRIBUTION TO CARE
76F with PMH acid reflux, non-compliant with PPI who presents to [facility] for dry sicky sensation to her mouth in the mornings. She saw Dr. Fernando ENT who gave her PPI, CCS and she took the CCS only. She came to the ED for eval shortly after, was told her steroids could be elevating her BP so she stopped this also. She is mostly concerned that her tongue is white, but exam by Dr. Fernando was wnl. Denies n/v/d/f/c, neck pain, decreased PO, immunocompromise, purulent sputum, CP.     Gen - NAD, Head - NCAT, Pharynx - clear, MMM, no thrush, uvula midline, no exudates, Heart - RRR, no m/g/r, Lungs - CTAB, no w/c/r, Abdomen - soft, NT, ND, Skin - No rash, Extremities - FROM, no edema, erythema, ecchymosis, brisk cap refill, Neuro - A&O x3, equal strength and sensation, non-focal exam      a/p:   reassurance for white tongue  nystatin swish and spit; no risks of treatment and close f/u with ENT  encouraged compliance with PPI

## 2021-10-13 NOTE — ED PROVIDER NOTE - CARE PROVIDER_API CALL
Niko Fernando)  Otolaryngology  [address] [place] [address], [address] [place] [address]  Phone: [phone]  Fax: [phone]  Established Patient  Follow Up Time: 1-3 Days

## 2021-10-13 NOTE — ED PROVIDER NOTE - PHYSICAL EXAMINATION
CONST: Well appearing in NAD, tearful, anxious on exam.  EYES: PERRL, EOMI, Sclera and conjunctiva clear. Vision 20/20  ENT: No nasal discharge. TM's clear B/L without drainage. Oropharynx normal appearing, no erythema or exudates. Uvula midline. No lesions to tongue, scant whitish coating to tongue.   CARD: Normal S1 S2; Normal rate and rhythm  RESP: Equal BS B/L, No wheezes, rhonchi or rales. No distress  MS: Normal ROM in all extremities. No midline spinal tenderness.  SKIN: Warm, dry, no acute rashes. Good turgor

## 2021-10-13 NOTE — ED PROVIDER NOTE - OBJECTIVE STATEMENT
77yo female with PMHx HTN, anxiety, GERD non-compliant with PPI, s/p nasal dilation procedure with "nasal tubes" inserted as per pt in July by Dr. Fernando presents c/o 3mths of persistent dry cough early in the morning that dissipates by afternoon and over the last one month, noticing a white coating with copious sputum in the mornings associated with bitter taste. Pt was seen by ENT two weeks ago for same, prescribed meds but reports did not take and is unsure what meds they were. Was also seen one week prior in ED for same above complaints. Has an upcoming ENT next week. Denies SOB, sore throat, fever, chills CP< weight loss.

## 2021-10-13 NOTE — ED ADULT NURSE NOTE - NSIMPLEMENTINTERV_GEN_ALL_ED
Implemented All Universal Safety Interventions:  [place] to call system. Call bell, personal items and telephone within reach. Instruct patient to call for assistance. Room bathroom lighting operational. Non-slip footwear when patient is off stretcher. Physically safe environment: no spills, clutter or unnecessary equipment. Stretcher in lowest position, wheels locked, appropriate side rails in place.

## 2021-10-13 NOTE — ED PROVIDER NOTE - CLINICAL SUMMARY MEDICAL DECISION MAKING FREE TEXT BOX
76F with PMH acid reflux, non-compliant with PPI who presents to [facility] for dry sicky sensation to her mouth in the mornings. pt given nystatin swish and spit, encouraged compliance with PPI, and close f/u with ENT.

## 2021-10-21 ENCOUNTER — APPOINTMENT (OUTPATIENT)
Dept: OTOLARYNGOLOGY | Facility: CLINIC | Age: 76
End: 2021-10-21
Payer: MEDICARE

## 2021-10-21 DIAGNOSIS — Z98.890 OTHER SPECIFIED POSTPROCEDURAL STATES: ICD-10-CM

## 2021-10-21 DIAGNOSIS — K21.9 GASTRO-ESOPHAGEAL REFLUX DISEASE W/OUT ESOPHAGITIS: ICD-10-CM

## 2021-10-21 PROCEDURE — 99214 OFFICE O/P EST MOD 30 MIN: CPT | Mod: 25

## 2021-10-21 PROCEDURE — 31575 DIAGNOSTIC LARYNGOSCOPY: CPT

## 2021-10-21 NOTE — HISTORY OF PRESENT ILLNESS
[FreeTextEntry1] :  Pt has complaints of tongue pain and some difficulty swallowing. Pt feels  some throat closing. Pt was seen in ER. Tongue was hot and burning and swollen. \par \par Pt has some improvement after procedure.

## 2021-10-28 ENCOUNTER — APPOINTMENT (OUTPATIENT)
Dept: OTOLARYNGOLOGY | Facility: CLINIC | Age: 76
End: 2021-10-28
Payer: MEDICARE

## 2021-10-28 PROCEDURE — 31575 DIAGNOSTIC LARYNGOSCOPY: CPT

## 2021-10-28 PROCEDURE — 99214 OFFICE O/P EST MOD 30 MIN: CPT | Mod: 25

## 2021-10-28 NOTE — PHYSICAL EXAM
[de-identified] : implant improved  [Normal] : mucosa is normal [Midline] : trachea located in midline position

## 2021-10-28 NOTE — HISTORY OF PRESENT ILLNESS
[FreeTextEntry1] : Pt has improvement in right nasal graft. Pain has decreased. Pt has increased foam in her mouth and feels some discomfort in the throat. Pt has heavy saliva and is increases at night. Burning tongue has improved.

## 2021-10-28 NOTE — PROCEDURE
[True Vocal Cords Erythematous] : bilateral true vocal cord edema [Glottis Arytenoid Cartilages Erythema] : bilateral arytenoid ~M erythema [Normal] : posterior cricoid area had healthy pink mucosa in the interarytenoid area and the esophageal inlet [Interarytenoid Edema] : interarytenoid area edematous

## 2021-11-18 ENCOUNTER — APPOINTMENT (OUTPATIENT)
Dept: OTOLARYNGOLOGY | Facility: CLINIC | Age: 76
End: 2021-11-18
Payer: MEDICARE

## 2021-11-18 DIAGNOSIS — R06.02 SHORTNESS OF BREATH: ICD-10-CM

## 2021-11-18 PROCEDURE — 31575 DIAGNOSTIC LARYNGOSCOPY: CPT

## 2021-11-18 PROCEDURE — 99214 OFFICE O/P EST MOD 30 MIN: CPT | Mod: 25

## 2021-11-18 NOTE — PROCEDURE
[Normal] : posterior cricoid area had healthy pink mucosa in the interarytenoid area and the esophageal inlet [Flexible Endoscope] : examined with the flexible endoscope

## 2021-11-18 NOTE — HISTORY OF PRESENT ILLNESS
[FreeTextEntry1] : Patient following up on excessive phlegm in her throat. Patient admits throat is okay. She c/o thick mucus in her mouth and white film on her tongue. Patient admits not getting better. She is getting frustrated. Pt had xray that was normal.

## 2021-12-09 ENCOUNTER — APPOINTMENT (OUTPATIENT)
Dept: OTOLARYNGOLOGY | Facility: CLINIC | Age: 76
End: 2021-12-09
Payer: MEDICARE

## 2021-12-09 PROCEDURE — 31575 DIAGNOSTIC LARYNGOSCOPY: CPT

## 2021-12-09 PROCEDURE — 99214 OFFICE O/P EST MOD 30 MIN: CPT | Mod: 25

## 2021-12-09 NOTE — REASON FOR VISIT
[Subsequent Evaluation] : a subsequent evaluation for [FreeTextEntry2] : chronic cough, acid reflux, SOB

## 2021-12-09 NOTE — HISTORY OF PRESENT ILLNESS
[FreeTextEntry1] : Patient following up on chronic cough, acid reflux, SOB.   Having a hard time breathing ,has thick mucus .

## 2021-12-29 ENCOUNTER — APPOINTMENT (OUTPATIENT)
Dept: OTOLARYNGOLOGY | Facility: CLINIC | Age: 76
End: 2021-12-29
Payer: MEDICARE

## 2021-12-29 DIAGNOSIS — R06.5 MOUTH BREATHING: ICD-10-CM

## 2021-12-29 DIAGNOSIS — R79.89 OTHER SPECIFIED ABNORMAL FINDINGS OF BLOOD CHEMISTRY: ICD-10-CM

## 2021-12-29 DIAGNOSIS — R09.89 OTHER SPECIFIED SYMPTOMS AND SIGNS INVOLVING THE CIRCULATORY AND RESPIRATORY SYSTEMS: ICD-10-CM

## 2021-12-29 PROCEDURE — 99213 OFFICE O/P EST LOW 20 MIN: CPT | Mod: 25

## 2021-12-29 PROCEDURE — 31231 NASAL ENDOSCOPY DX: CPT

## 2021-12-29 NOTE — HISTORY OF PRESENT ILLNESS
[FreeTextEntry1] : Patient returns today following up on  chronic cough , acid reflux .   Still having excessive mucus.

## 2022-01-26 ENCOUNTER — APPOINTMENT (OUTPATIENT)
Dept: OTOLARYNGOLOGY | Facility: CLINIC | Age: 77
End: 2022-01-26

## 2022-01-26 ENCOUNTER — APPOINTMENT (OUTPATIENT)
Dept: OTOLARYNGOLOGY | Facility: CLINIC | Age: 77
End: 2022-01-26
Payer: MEDICARE

## 2022-01-26 DIAGNOSIS — R22.1 LOCALIZED SWELLING, MASS AND LUMP, NECK: ICD-10-CM

## 2022-01-26 DIAGNOSIS — K14.8 OTHER DISEASES OF TONGUE: ICD-10-CM

## 2022-01-26 PROCEDURE — 31575 DIAGNOSTIC LARYNGOSCOPY: CPT

## 2022-01-26 PROCEDURE — 99214 OFFICE O/P EST MOD 30 MIN: CPT | Mod: 25

## 2022-02-07 ENCOUNTER — OUTPATIENT (OUTPATIENT)
Dept: OUTPATIENT SERVICES | Facility: HOSPITAL | Age: 77
LOS: 1 days | Discharge: HOME | End: 2022-02-07
Payer: MEDICARE

## 2022-02-07 ENCOUNTER — RESULT REVIEW (OUTPATIENT)
Age: 77
End: 2022-02-07

## 2022-02-07 DIAGNOSIS — Z98.890 OTHER SPECIFIED POSTPROCEDURAL STATES: Chronic | ICD-10-CM

## 2022-02-07 DIAGNOSIS — G56.03 CARPAL TUNNEL SYNDROME, BILATERAL UPPER LIMBS: Chronic | ICD-10-CM

## 2022-02-07 DIAGNOSIS — Z90.49 ACQUIRED ABSENCE OF OTHER SPECIFIED PARTS OF DIGESTIVE TRACT: Chronic | ICD-10-CM

## 2022-02-07 DIAGNOSIS — R09.89 OTHER SPECIFIED SYMPTOMS AND SIGNS INVOLVING THE CIRCULATORY AND RESPIRATORY SYSTEMS: ICD-10-CM

## 2022-02-07 PROCEDURE — 70490 CT SOFT TISSUE NECK W/O DYE: CPT | Mod: 26

## 2022-02-10 ENCOUNTER — APPOINTMENT (OUTPATIENT)
Dept: OTOLARYNGOLOGY | Facility: CLINIC | Age: 77
End: 2022-02-10
Payer: MEDICARE

## 2022-02-10 DIAGNOSIS — K14.6 GLOSSODYNIA: ICD-10-CM

## 2022-02-10 PROCEDURE — 99213 OFFICE O/P EST LOW 20 MIN: CPT | Mod: 25

## 2022-02-10 PROCEDURE — 31575 DIAGNOSTIC LARYNGOSCOPY: CPT

## 2022-02-10 NOTE — REASON FOR VISIT
[Subsequent Evaluation] : a subsequent evaluation for [FreeTextEntry2] : acid reflux, neck swelling

## 2022-02-10 NOTE — HISTORY OF PRESENT ILLNESS
[FreeTextEntry1] : Patient following up on acid reflux, neck swelling. Patient sent for CT scan that was reviewed and is unremarkable . Patient denies any improvement with antibiotics. Pt has know sleep apnea and symptoms are worsened at night. Pt has no treatment at this time.  [Obstructive Sleep Apnea] : Obstructive Sleep Apnea [Snoring] : snoring [Witnessed Apneas] : witnessed sleep apnea [Frequent Nocturnal Awakening] : frequent nocturnal awakening [Daytime Somnolence] : daytime somnolence [Unintentional Sleep While Inactive] : unintentional sleep while inactive [Awakes Unrefreshed] : awakening unrefreshed

## 2022-02-24 ENCOUNTER — OUTPATIENT (OUTPATIENT)
Dept: OUTPATIENT SERVICES | Facility: HOSPITAL | Age: 77
LOS: 1 days | Discharge: HOME | End: 2022-02-24
Payer: MEDICARE

## 2022-02-24 DIAGNOSIS — G56.03 CARPAL TUNNEL SYNDROME, BILATERAL UPPER LIMBS: Chronic | ICD-10-CM

## 2022-02-24 DIAGNOSIS — Z98.890 OTHER SPECIFIED POSTPROCEDURAL STATES: Chronic | ICD-10-CM

## 2022-02-24 DIAGNOSIS — Z90.49 ACQUIRED ABSENCE OF OTHER SPECIFIED PARTS OF DIGESTIVE TRACT: Chronic | ICD-10-CM

## 2022-02-24 PROCEDURE — 95810 POLYSOM 6/> YRS 4/> PARAM: CPT | Mod: 26

## 2022-02-25 DIAGNOSIS — G47.33 OBSTRUCTIVE SLEEP APNEA (ADULT) (PEDIATRIC): ICD-10-CM

## 2022-03-03 ENCOUNTER — APPOINTMENT (OUTPATIENT)
Dept: OTOLARYNGOLOGY | Facility: CLINIC | Age: 77
End: 2022-03-03
Payer: MEDICARE

## 2022-03-03 DIAGNOSIS — R05.9 COUGH, UNSPECIFIED: ICD-10-CM

## 2022-03-03 DIAGNOSIS — R13.14 DYSPHAGIA, PHARYNGOESOPHAGEAL PHASE: ICD-10-CM

## 2022-03-03 DIAGNOSIS — G47.33 OBSTRUCTIVE SLEEP APNEA (ADULT) (PEDIATRIC): ICD-10-CM

## 2022-03-03 PROCEDURE — 99214 OFFICE O/P EST MOD 30 MIN: CPT

## 2022-03-03 NOTE — HISTORY OF PRESENT ILLNESS
[FreeTextEntry1] : Patient following up on sleep apnea, burning tongue. Patient had sleep study done; \par No real improvement on symptoms. Patient c/o SOB recently. AHI = 8.8. Continues ot have choking and regurgitation. CT neck done

## 2022-03-03 NOTE — REASON FOR VISIT
[Subsequent Evaluation] : a subsequent evaluation for [FreeTextEntry2] : sleep apnea, burning tongue, chronic cough

## 2022-03-11 ENCOUNTER — RESULT REVIEW (OUTPATIENT)
Age: 77
End: 2022-03-11

## 2022-03-11 ENCOUNTER — OUTPATIENT (OUTPATIENT)
Dept: OUTPATIENT SERVICES | Facility: HOSPITAL | Age: 77
LOS: 1 days | Discharge: HOME | End: 2022-03-11
Payer: MEDICARE

## 2022-03-11 DIAGNOSIS — Z98.890 OTHER SPECIFIED POSTPROCEDURAL STATES: Chronic | ICD-10-CM

## 2022-03-11 DIAGNOSIS — Z90.49 ACQUIRED ABSENCE OF OTHER SPECIFIED PARTS OF DIGESTIVE TRACT: Chronic | ICD-10-CM

## 2022-03-11 DIAGNOSIS — G56.03 CARPAL TUNNEL SYNDROME, BILATERAL UPPER LIMBS: Chronic | ICD-10-CM

## 2022-03-11 DIAGNOSIS — R13.14 DYSPHAGIA, PHARYNGOESOPHAGEAL PHASE: ICD-10-CM

## 2022-03-11 PROCEDURE — 74220 X-RAY XM ESOPHAGUS 1CNTRST: CPT | Mod: 26

## 2022-03-21 ENCOUNTER — APPOINTMENT (OUTPATIENT)
Dept: OTOLARYNGOLOGY | Facility: CLINIC | Age: 77
End: 2022-03-21
Payer: MEDICARE

## 2022-03-21 DIAGNOSIS — K22.4 DYSKINESIA OF ESOPHAGUS: ICD-10-CM

## 2022-03-21 PROCEDURE — 99214 OFFICE O/P EST MOD 30 MIN: CPT

## 2022-03-21 NOTE — REASON FOR VISIT
[Subsequent Evaluation] : a subsequent evaluation for [FreeTextEntry2] : dysphagia , choking sensation

## 2022-03-21 NOTE — HISTORY OF PRESENT ILLNESS
[Mastoid] : mastoid surgery [FreeTextEntry1] : PAtient returns today following up on  dysphagia , choking sensation  .  She had Xray esophagram performed , here to discuss results .   Still  choking.

## 2022-03-21 NOTE — DATA REVIEWED
[de-identified] : Barium esophagram images personally reviewed. No evidence of diverticulum, smooth mucosa/contrast interface. There is esophageal dysmotility with nonpropulsive contractions.

## 2022-03-31 ENCOUNTER — EMERGENCY (EMERGENCY)
Facility: HOSPITAL | Age: 77
LOS: 0 days | Discharge: HOME | End: 2022-03-31
Attending: EMERGENCY MEDICINE | Admitting: EMERGENCY MEDICINE
Payer: MEDICARE

## 2022-03-31 VITALS
HEIGHT: 62 IN | OXYGEN SATURATION: 98 % | HEART RATE: 74 BPM | TEMPERATURE: 98 F | DIASTOLIC BLOOD PRESSURE: 74 MMHG | RESPIRATION RATE: 16 BRPM | SYSTOLIC BLOOD PRESSURE: 170 MMHG | WEIGHT: 123.9 LBS

## 2022-03-31 DIAGNOSIS — G43.909 MIGRAINE, UNSPECIFIED, NOT INTRACTABLE, WITHOUT STATUS MIGRAINOSUS: ICD-10-CM

## 2022-03-31 DIAGNOSIS — Z90.49 ACQUIRED ABSENCE OF OTHER SPECIFIED PARTS OF DIGESTIVE TRACT: Chronic | ICD-10-CM

## 2022-03-31 DIAGNOSIS — F41.9 ANXIETY DISORDER, UNSPECIFIED: ICD-10-CM

## 2022-03-31 DIAGNOSIS — I10 ESSENTIAL (PRIMARY) HYPERTENSION: ICD-10-CM

## 2022-03-31 DIAGNOSIS — Z87.898 PERSONAL HISTORY OF OTHER SPECIFIED CONDITIONS: ICD-10-CM

## 2022-03-31 DIAGNOSIS — Z98.890 OTHER SPECIFIED POSTPROCEDURAL STATES: Chronic | ICD-10-CM

## 2022-03-31 DIAGNOSIS — Z96.653 PRESENCE OF ARTIFICIAL KNEE JOINT, BILATERAL: ICD-10-CM

## 2022-03-31 DIAGNOSIS — R13.10 DYSPHAGIA, UNSPECIFIED: ICD-10-CM

## 2022-03-31 DIAGNOSIS — K21.9 GASTRO-ESOPHAGEAL REFLUX DISEASE WITHOUT ESOPHAGITIS: ICD-10-CM

## 2022-03-31 DIAGNOSIS — Z88.8 ALLERGY STATUS TO OTHER DRUGS, MEDICAMENTS AND BIOLOGICAL SUBSTANCES: ICD-10-CM

## 2022-03-31 DIAGNOSIS — Z90.49 ACQUIRED ABSENCE OF OTHER SPECIFIED PARTS OF DIGESTIVE TRACT: ICD-10-CM

## 2022-03-31 DIAGNOSIS — Z87.19 PERSONAL HISTORY OF OTHER DISEASES OF THE DIGESTIVE SYSTEM: ICD-10-CM

## 2022-03-31 DIAGNOSIS — G56.03 CARPAL TUNNEL SYNDROME, BILATERAL UPPER LIMBS: Chronic | ICD-10-CM

## 2022-03-31 DIAGNOSIS — R68.2 DRY MOUTH, UNSPECIFIED: ICD-10-CM

## 2022-03-31 PROCEDURE — 99283 EMERGENCY DEPT VISIT LOW MDM: CPT

## 2022-03-31 RX ORDER — SUCRALFATE 1 G
1 TABLET ORAL
Qty: 28 | Refills: 0
Start: 2022-03-31 | End: 2022-04-06

## 2022-03-31 RX ORDER — FAMOTIDINE 10 MG/ML
1 INJECTION INTRAVENOUS
Qty: 30 | Refills: 0
Start: 2022-03-31 | End: 2022-04-29

## 2022-03-31 NOTE — ED ADULT TRIAGE NOTE - BEFAST BALANCE
[facility]  ED  Emergency Department Encounter  EmergencyMedicine Resident     Pt Rita Apgar  MRN: [id number]  [place] 1944  Date of evaluation: 10/2/21  PCP:  KALYN Turner - CNP    CHIEF COMPLAINT       Chief Complaint   Patient presents with    Irregular Heart Beat     SVT, adenosine x2 without conversion, 20mg cardizem without conversion       HISTORY OF PRESENT ILLNESS  (Location/Symptom, Timing/Onset, Context/Setting, Quality, Duration, Modifying Factors, Severity.)      Chandana Read is a 69 y.o. female who presents with shortness of breath the past 5 days. Patient started feeling worse today, and came to the emergency department by for evaluation by EMS. EMS gave the patient 6 mg and 12 mg of adenosine without improvement. Patient arrives in atrial fibrillation with RVR, denies any history of atrial fibrillation with RVR. Patient does have a history of COPD and has wheezing on exam as well. Per EMS, patient does not want intubated but was okay with up to BiPAP. I did confirm this with the patient upon arrival and she does state that she does not want intubated. PAST MEDICAL / SURGICAL / SOCIAL / FAMILY HISTORY      has a past medical history of Asthma, COPD (chronic obstructive pulmonary disease) ([facility] [address] Gout, Hyperlipidemia, Hypertension, Mitral insufficiency, and Pneumonia. has a past surgical history that includes Hysterectomy (); knee surgery; Colonoscopy; Dental surgery; Tonsillectomy and adenoidectomy; Ankle surgery (Left, 2016); and pr colsc flx w/rmvl of tumor polyp lesion snare tq (N/A, 2017). Social History     Socioeconomic History    Marital status:       Spouse name: Not on file    Number of children: Not on file    Years of education: Not on file    Highest education level: Not on file   Occupational History    Not on file   Tobacco Use    Smoking status: Former Smoker    Smokeless tobacco: Never Used    Tobacco comment: over 30 years ago    Vaping Use    Vaping Use: Never used   Substance and Sexual Activity    Alcohol use: Yes     Alcohol/week: 0.0 standard drinks     Comment: occasionally     Drug use: No    Sexual activity: Not on file   Other Topics Concern    Not on file   Social History Narrative    Not on file     Social Determinants of Health     Financial Resource Strain:     Difficulty of Paying Living Expenses:    Food Insecurity:     Worried About Running Out of Food in the Last Year:     [address] in the Last Year:    Transportation Needs:     Lack of Transportation (Medical):  Lack of Transportation (Non-Medical):    Physical Activity:     Days of Exercise per Week:     Minutes of Exercise per Session:    Stress:     Feeling of Stress :    Social Connections:     Frequency of Communication with Friends and Family:     Frequency of Social Gatherings with Friends and Family:     Attends [employer] Services:     Active Member of Clubs or Organizations:     Attends Club or Organization Meetings:     Marital Status:    Intimate Partner Violence:     Fear of Current or Ex-Partner:     Emotionally Abused:     Physically Abused:     Sexually Abused:        Family History   Problem Relation Age of Onset    High Blood Pressure Mother     Cancer Mother     Asthma Father     Heart Disease Father     Cancer Father        Allergies:  Latex, Betadine [povidone iodine], Bee venom, Dilaudid [hydromorphone hcl], Adhesive tape, and Sulfa antibiotics    Home Medications:  Prior to Admission medications    Medication Sig Start Date End Date Taking? Authorizing Provider   losartan (COZAAR) 100 MG tablet Take 1 tablet by mouth daily 8/28/18   Praveena Downing MD   predniSONE (DELTASONE) 20 MG tablet Take 1 tab BID x 5 days, then 1 tab daily x 5 days. Take with food.  2/27/18   Praveena Downing MD   meloxicam (MOBIC) 15 MG tablet Take 15 mg by mouth daily    Historical Provider, MD   atorvastatin (LIPITOR) 20 MG tablet Take 1 tablet by mouth daily 10/5/17   Sneha Bassett MD   metoprolol tartrate (LOPRESSOR) 50 MG tablet TAKE ONE TABLET BY MOUTH TWICE DAILY 10/5/17   Sneha Bassett MD   chlorthalidone (HYGROTON) 25 MG tablet Take 1 tablet by mouth daily 10/5/17   Sneha Bassett MD   clopidogrel (PLAVIX) 75 MG tablet Take 1 tablet by mouth daily 10/5/17   Sneha Bassett MD   cyclobenzaprine (FLEXERIL) 10 MG tablet TAKE ONE TABLET BY MOUTH THREE TIMES DAILY AS NEEDED FOR MUSCLE SPASM 10/5/17   Sneha Bassett MD   diphenhydrAMINE (BENADRYL) 25 MG capsule Take 25 mg by mouth every 6 hours as needed for Itching    Historical Provider, MD   zinc oxide (DESITIN) 40 % ointment Apply topically as needed for Dry Skin Apply topically as needed.     Historical Provider, MD   furosemide (LASIX) 20 MG tablet Take 1 tablet by mouth daily 7/14/17   Sneha Bassett MD   isosorbide mononitrate (IMDUR) 30 MG extended release tablet Take 1 tablet by mouth daily 7/14/17   Sneha Bassett MD   albuterol sulfate  (90 Base) MCG/ACT inhaler Inhale 2 puffs into the lungs every 6 hours as needed for Wheezing 7/14/17   Sneha Bassett MD   Calcium-Magnesium-Zinc (FLAKITO-MAG-ZINC PO) Take by mouth daily    Historical Provider, MD   Calcium Carb-Cholecalciferol (CALCIUM + D3 PO) Take by mouth daily    Historical Provider, MD   CINNAMON PO Take by mouth daily    Historical Provider, MD   TURMERIC CURCUMIN PO Take by mouth daily    Historical Provider, MD   ipratropium-albuterol (DUONEB) 0.5-2.5 (3) MG/3ML SOLN nebulizer solution Inhale 1 vial into the lungs every 4 hours    Historical Provider, MD   beclomethasone (QVAR) 80 MCG/ACT inhaler Inhale 2 puffs into the lungs 2 times daily 6/17/16   Sneha Bassett MD   Acetaminophen 650 MG TABS Take 650 mg by mouth every 4 hours as needed for Pain or Fever 4/4/16   Renetta Pena MD   clonazePAM (KLONOPIN) 1 MG tablet Take 1 mg by mouth nightly     Historical Provider, MD   citalopram (CELEXA) 40 MG tablet Take 40 mg by mouth daily as needed    Historical Provider, MD   LYSINE PO Take by mouth daily    Historical Provider, MD   vitamin D (CHOLECALCIFEROL) 1000 UNIT TABS tablet Take 1,000 Units by mouth daily    Historical Provider, MD   Pyridoxine HCl (VITAMIN B-6) 50 MG tablet Take 100 mg by mouth daily    Historical Provider, MD   Cyanocobalamin (VITAMIN B 12 PO) Take by mouth daily    Historical Provider, MD   Magnesium 500 MG TABS Take by mouth    Historical Provider, MD   Ascorbic Acid (VITAMIN C) 500 MG tablet Take 500 mg by mouth daily    Historical Provider, MD       REVIEW OF SYSTEMS    (2-9 systems for level 4, 10 or more for level 5)      Review of Systems    PHYSICAL EXAM   (up to 7 for level 4, 8 or more for level 5)      INITIAL VITALS:   /67   Pulse 126   Temp 99 °F (37.2 °C) (Oral)   Resp (!) 32   Wt 260 lb (117.9 kg)   SpO2 97%   BMI 46.80 kg/m²     Physical Exam    DIFFERENTIAL  DIAGNOSIS     PLAN (LABS / IMAGING / EKG):  Orders Placed This Encounter   Procedures    COVID-19, Rapid    Culture, Urine    Culture, Blood 1    Culture, Blood 2    XR CHEST PORTABLE    CBC Auto Differential    Basic Metabolic Panel w/ Reflex to MG    Troponin    Brain Natriuretic Peptide    APTT    BLOOD GAS, VENOUS    Protime-INR    APTT    Immature Platelet Fraction    Lactate, Sepsis    Lipase    Amylase    Urinalysis with microscopic    Magnesium    Troponin    Blood Gas, Venous    Inpatient consult to Cardiology    Inpatient consult to Hospitalist    BIPAP    EKG 12 Lead    PATIENT STATUS (FROM ED OR OR/PROCEDURAL) Inpatient       MEDICATIONS ORDERED:  Orders Placed This Encounter   Medications    dilTIAZem 125 mg in dextrose 5 % 125 mL infusion    0.9 % sodium chloride bolus    dilTIAZem injection 20 mg    dilTIAZem 25 MG/5ML injection     JENY LUCIO: cabinet override    heparin (porcine) injection 4,000 Units    heparin (porcine) injection 4,000 Units    heparin (porcine) injection 2,000 Units    heparin 25,000 units in dextrose 5% 250 mL (premix) infusion    cefTRIAXone (ROCEPHIN) 1000 mg IVPB in 50 mL D5W minibag     Order Specific Question:   Antimicrobial Indications     Answer:   Pneumonia (CAP)    azithromycin (ZITHROMAX) 500 mg in dextrose 5% 250 mL IVPB     Order Specific Question:   Antimicrobial Indications     Answer:   Pneumonia (CAP)    metoprolol (LOPRESSOR) injection 5 mg    ipratropium-albuterol (DUONEB) nebulizer solution 1 ampule     Order Specific Question:   Initiate RT Bronchodilator Protocol     Answer: Yes    predniSONE (DELTASONE) tablet 60 mg    furosemide (LASIX) injection 40 mg    LORazepam (ATIVAN) injection 1 mg    digoxin (LANOXIN) injection 125 mcg    nitroGLYCERIN (NITROSTAT) SL tablet 0.4 mg       DDX: Atrial fibrillation with RVR, CHF, COPD, pneumonia, sepsis, electrolyte abnormality    MDM/IMPRESSION: This is a 72-year-old female presenting with tachycardia shortness of breath, shortness of breath ongoing for 5 days but has been worsening. Patient arrives in A. fib with RVR. Patient tachypneic, but saturating well on 4 L nasal cannula. Patient does have wheezing and crackles bilaterally. Denies any cardiac history of CHF or heart problems. Concern for atrial fibrillation with RVR, new onset, in addition to possible new onset CHF. Plan for cardiac evaluation, chest x-ray, EKG, will start on diltiazem infusion for rate control as patient is tachycardic in the 160s. We will also start heparin for new onset atrial fibrillation with RVR. Patient did receive breathing treatment prior to arrival, however still has some wheezing. Will order breathing treatment as well, continue to evaluate closely. On arrival patient did states she did not want intubated but was okay with BiPAP.   At this time, does not need BiPAP but will continue to monitor closely for worsening shortness of breath. Admission. DIAGNOSTIC RESULTS / EMERGENCY DEPARTMENT COURSE / MDM   LAB RESULTS:  Results for orders placed or performed during the hospital encounter of 10/02/21   COVID-19, Rapid    Specimen: Nasopharyngeal Swab   Result Value Ref Range    Specimen Description . NASOPHARYNGEAL SWAB     SARS-CoV-2, Rapid Not Detected Not Detected   Culture, Blood 1    Specimen: Blood   Result Value Ref Range    Specimen Description . BLOOD     Special Requests RT FA 13ML     Culture NO GROWTH 1 HOUR    Culture, Blood 2    Specimen: Blood   Result Value Ref Range    Specimen Description . BLOOD     Special Requests LTAC 10ML     Culture NO GROWTH 1 HOUR    CBC Auto Differential   Result Value Ref Range    WBC 28.5 (H) 3.5 - 11.3 k/uL    RBC 4.63 3.95 - 5.11 m/uL    Hemoglobin 14.2 11.9 - 15.1 g/dL    Hematocrit 42.8 36.3 - 47.1 %    MCV 92.4 82.6 - 102.9 fL    MCH 30.7 25.2 - 33.5 pg    MCHC 33.2 28.4 - 34.8 g/dL    RDW 12.8 11.8 - 14.4 %    Platelets See Reflexed IPF Result 138 - 453 k/uL    MPV NOT REPORTED 8.1 - 13.5 fL    NRBC Automated 0.0 0.0 per 100 WBC    Differential Type NOT REPORTED     WBC Morphology NOT REPORTED     RBC Morphology NOT REPORTED     Platelet Estimate NOT REPORTED     Immature Granulocytes 0 0 %    Seg Neutrophils 88 (H) 36 - 66 %    Lymphocytes 2 (L) 24 - 44 %    Monocytes 10 (H) 1 - 7 %    Eosinophils % 0 (L) 1 - 4 %    Basophils 0 0 - 2 %    Absolute Immature Granulocyte 0.00 0.00 - 0.30 k/uL    Segs Absolute 25.08 (H) 1.8 - 7.7 k/uL    Absolute Lymph # 0.57 (L) 1.0 - 4.8 k/uL    Absolute Mono # 2.85 (H) 0.1 - 0.8 k/uL    Absolute Eos # 0.00 0.0 - 0.4 k/uL    Basophils Absolute 0.00 0.0 - 0.2 k/uL    Morphology Normal    Basic Metabolic Panel w/ Reflex to MG   Result Value Ref Range    Glucose 159 (H) 70 - 99 mg/dL    BUN 47 (H) 8 - 23 mg/dL    CREATININE 1.23 (H) 0.50 - 0.90 mg/dL    Bun/Cre Ratio NOT REPORTED 9 - 20    Calcium 9.2 8.6 - 10.4 mg/dL    Sodium 130 (L) 135 - 144 mmol/L    Potassium 3.5 (L) 3.7 - 5.3 mmol/L    Chloride 90 (L) 98 - 107 mmol/L    CO2 21 20 - 31 mmol/L    Anion Gap 19 (H) 9 - 17 mmol/L    GFR Non-African American 42 (L) >60 mL/min    GFR  51 (L) >60 mL/min    GFR Comment          GFR Staging NOT REPORTED    Troponin   Result Value Ref Range    Troponin, High Sensitivity 96 (HH) 0 - 14 ng/L    Troponin T NOT REPORTED <0.03 ng/mL    Troponin Interp NOT REPORTED    Brain Natriuretic Peptide   Result Value Ref Range    Pro-BNP 3,251 (H) <300 pg/mL    BNP Interpretation Pro-BNP Reference Range:    BLOOD GAS, VENOUS   Result Value Ref Range    pH, Jose 7.357 7.320 - 7.420    pCO2, Jose 44.2 39 - 55    pO2, Jose 50.0 30 - 50    HCO3, Venous 24.2 24 - 30 mmol/L    Positive Base Excess, Jose NOT REPORTED 0.0 - 2.0 mmol/L    Negative Base Excess, Jose 1.0 0.0 - 2.0 mmol/L    O2 Sat, Jose 81.3 60.0 - 85.0 %    Total Hb NOT REPORTED 12.0 - 16.0 g/dl    Oxyhemoglobin NOT REPORTED 95.0 - 98.0 %    Carboxyhemoglobin 2.0 0 - 5 %    Methemoglobin NOT REPORTED 0.0 - 1.5 %    Pt Temp 37.0     pH, Jose, Temp Adj NOT REPORTED 7.320 - 7.420    pCO2, Jose, Temp Adj NOT REPORTED 39 - 55 mmHg    pO2, Jose, Temp Adj NOT REPORTED 30 - 50 mmHg    O2 Device/Flow/% NOT REPORTED     Respiratory Rate NOT REPORTED     Jake Test NOT REPORTED     Sample Site NOT REPORTED     Pt.  Position NOT REPORTED     Mode NOT REPORTED     Set Rate NOT REPORTED     Total Rate NOT REPORTED     VT NOT REPORTED     FIO2 INFORMATION NOT PROVIDED     Peep/Cpap NOT REPORTED     PSV NOT REPORTED     Text for Respiratory NOT REPORTED     NOTIFICATION NOT REPORTED     NOTIFICATION TIME NOT REPORTED    Protime-INR   Result Value Ref Range    Protime 11.3 9.1 - 12.3 sec    INR 1.1    APTT   Result Value Ref Range    PTT 22.6 20.5 - 30.5 sec   Immature Platelet Fraction   Result Value Ref Range    Platelet, Immature Fraction NOT REPORTED 1.1 - 10.3 %    Platelet, Fluorescence Platelet clumps present, count appears adequate. 138 - 453 k/uL   Lactate, Sepsis   Result Value Ref Range    Lactic Acid, Sepsis NOT REPORTED 0.5 - 1.9 mmol/L    Lactic Acid, Sepsis, Whole Blood 1.6 0.5 - 1.9 mmol/L   Lipase   Result Value Ref Range    Lipase 6 (L) 13 - 60 U/L   Amylase   Result Value Ref Range    Amylase 7 (L) 28 - 100 U/L   Magnesium   Result Value Ref Range    Magnesium 2.4 1.6 - 2.6 mg/dL   Troponin   Result Value Ref Range    Troponin, High Sensitivity 92 (HH) 0 - 14 ng/L    Troponin T NOT REPORTED <0.03 ng/mL    Troponin Interp NOT REPORTED    Blood Gas, Venous   Result Value Ref Range    pH, Jose 7.322 7.320 - 7.420    pCO2, Jose 47.6 39 - 55    pO2, Jose 61.1 (H) 30 - 50    HCO3, Venous 23.9 (L) 24 - 30 mmol/L    Positive Base Excess, Jose NOT REPORTED 0.0 - 2.0 mmol/L    Negative Base Excess, Jose 2.0 0.0 - 2.0 mmol/L    O2 Sat, Jose 90.5 (H) 60.0 - 85.0 %    Total Hb NOT REPORTED 12.0 - 16.0 g/dl    Oxyhemoglobin NOT REPORTED 95.0 - 98.0 %    Carboxyhemoglobin 1.5 0 - 5 %    Methemoglobin NOT REPORTED 0.0 - 1.5 %    Pt Temp 37.0     pH, Jose, Temp Adj NOT REPORTED 7.320 - 7.420    pCO2, Jose, Temp Adj NOT REPORTED 39 - 55 mmHg    pO2, Jose, Temp Adj NOT REPORTED 30 - 50 mmHg    O2 Device/Flow/% NOT REPORTED     Respiratory Rate NOT REPORTED     Jake Test NOT REPORTED     Sample Site NOT REPORTED     Pt. Position NOT REPORTED     Mode NOT REPORTED     Set Rate NOT REPORTED     Total Rate NOT REPORTED     VT NOT REPORTED     FIO2 INFORMATION NOT PROVIDED     Peep/Cpap NOT REPORTED     PSV NOT REPORTED     Text for Respiratory NOT REPORTED     NOTIFICATION NOT REPORTED     NOTIFICATION TIME NOT REPORTED          RADIOLOGY:  XR CHEST PORTABLE   Final Result   Findings most consistent with mild congestive heart failure. Left retrocardiac opacification/consolidation may represent a combination of   pleural fluid and airspace disease. Underlying pneumonia not excluded.               EKG  EKG Interpretation    Interpreted by emergency department physician    Rhythm: atrial fibrillation - rapid  Rate: 140-150  Axis: left  Ectopy: none  Conduction: LBBB  ST Segments: no acute change  T Waves: no acute change  Q Waves: nonspecific    Clinical Impression: atrial fibrillation (new onset)    Gena Lee DO    All EKG's are interpreted by the Emergency Department Physician who either signs or Co-signs this chart in the absence of a cardiologist.    EMERGENCY DEPARTMENT COURSE:  ED Course as of Oct 02 2120   Sat Oct 02, 2021   1805 WBC(!): 28.5 [JG]   1817 Leukocytosis, sepsis ordered initiated. Troponin of 96 and BNP of 3251. Patient remains in A. fib with RVR, diltiazem infusion started, as well as heparin infusion. Rate not under control at this time, patient states that she subjectively does feel better. [JG]   0531 With cardiology, recommend Lopressor 5 mg, agree with heparin drip, and if no improvement with 2 doses of Lopressor would consider digoxin. [JG]   1859 Patient appears to be in CHF, will not give full 30 cc/kg bolus after discussion with the patient that this could make her shortness of breath worse. Blood pressures are stable. Patient's rate is better controlled on the Cardizem 10 mg an hour as well as the 5 mg of Lopressor given. Patient continues to feel improved overall. [JG]   1906 Lactic Acid, Sepsis, Whole Blood: 1.6 [JG]   1954 Patient with increasing shortness of breath and is currently wheezing with a history of COPD, however I do suspect CHF component and will start BiPAP. Patient also given Lasix, 1 mg of Ativan to help relax with the BiPAP. [JG]      ED Course User Index  [JG] Gena Lee DO        PROCEDURES:      CONSULTS:  IP CONSULT TO CARDIOLOGY  IP CONSULT TO HOSPITALIST    CRITICAL CARE:      FINAL IMPRESSION      1. Atrial fibrillation with rapid ventricular response ([address]    2. Pneumonia due to infectious organism, unspecified laterality, unspecified part of lung    3. Chronic obstructive pulmonary disease with acute exacerbation ([facility] [address]          DISPOSITION / PLAN     DISPOSITION Admitted 10/02/2021 07:21:58 PM      PATIENT REFERRED TO:  No follow-up provider specified.     DISCHARGE MEDICATIONS:  New Prescriptions    No medications on file       Roland Maradiaga DO  Emergency Medicine Resident    (Please note that portions of thisnote were completed with a voice recognition program.  Efforts were made to edit the dictations but occasionally words are mis-transcribed.)      Roland Maradiaga DO  10/02/21 2118       Roland Maradiaga DO  10/02/21 2120 No

## 2022-03-31 NOTE — ED PROVIDER NOTE - ATTENDING CONTRIBUTION TO CARE
76-year-old female with past medical history of hypertension and GERD not any medication at this time presents to ED for white tongue and some discomfort when swallowing.  Patient saw ENT and was concerned for some esophageal motility and told her to follow-up with GI.  Patient states she has not had appointment until June she came to the emergency department to see if she can get an appointment sooner.  Patient states she wakes up and sometimes has a white tongue and dry mouth.  Patient has not had any difficulty swallowing no dysphagia no drooling no chest pain or shortness of breath no nausea no vomiting no abdominal pain no diarrhea.    Const: Well nourished, well developed, appears stated age  Eyes: PERRL, no conjunctival injection  HENT:  Neck supple without meningismus, normal tongue   CV: RRR, Warm, well-perfused extremities  RESP: CTA B/L, no tachypnea   GI: soft, non-tender, non-distended  MSK: No gross deformities appreciated  Skin: Warm, dry. No rashes  Neuro: Alert, CNs II-XII grossly intact. Sensation and motor function of extremities grossly intact.  Psych: Appropriate mood and affect.    will have ED coordinator help with GI appointment

## 2022-03-31 NOTE — ED ADULT NURSE NOTE - OBJECTIVE STATEMENT
pt reports difficulty swallowing for a few months. has o/p ent appt in june, but problem is consistent

## 2022-03-31 NOTE — ED PROVIDER NOTE - OBJECTIVE STATEMENT
76 y.o female w/ hx of HTN, GERD presents to the ED for evaluation of difficulty swallowing x months.  Saw ENT, had xr esophageal motility which showed moderate disordered esophageal motility.  Was told to f/u with gastroenterology, couldn't get appointment until June prompting visit to the ED. NO abd pain, chest pain, dyspnea, fever, chills.  No further complaints.

## 2022-03-31 NOTE — ED PROVIDER NOTE - CARE PROVIDER_API CALL
Dionna Blas (MD)  Gastroenterology  [address] [place] [address]  Phone: [phone]  Fax: [phone]  Follow Up Time: 1-3 Days

## 2022-03-31 NOTE — ED PROVIDER NOTE - NSFOLLOWUPINSTRUCTIONS_ED_ALL_ED_FT
CHRONIC DYSPHAGIA - Discharge Care           Chronic Dysphagia    WHAT YOU NEED TO KNOW:    Chronic dysphagia is trouble swallowing. You may have trouble moving food or liquid from your mouth to your esophagus or down to your stomach. You may have the problem when you eat, drink, or any time you try to swallow. Dysphagia is considered chronic when it continues longer than a few months.    DISCHARGE INSTRUCTIONS:    Call your local emergency number (911 in the [place]) if:   •You have chest pain.      •You have shortness of breath.      Seek care immediately if:   •You cannot eat or drink liquids at all.           Call your doctor if:   •You lose weight without trying.      •Your signs and symptoms get worse, or you have new signs or symptoms.      •You have signs or symptoms of dehydration, such as increased thirst, dark yellow urine, or little or no urine.      •You get colds often.      •You have questions or concerns about your condition or care.      Nutrition changes: Changes to your food and drinks may reduce choking problems. Your healthcare provider may show you how to thicken liquids or soften foods to make them easier to swallow.    A therapist can teach you different ways of swallowing by changing your head and body positions. You may be taught exercises to strengthen the muscles that help you swallow.    Follow up with your healthcare provider as directed: Write down your questions so you remember to ask them during your visits.

## 2022-03-31 NOTE — ED ADULT TRIAGE NOTE - CHIEF COMPLAINT QUOTE
Patient presents to ED with complaints of "lump sensation in throat" since July and is unable to get an appointment for treatment. "The doctor said there's something in my esophagus and they won't give me the medicine to treat it."

## 2022-03-31 NOTE — ED PROVIDER NOTE - PROGRESS NOTE DETAILS
Patient is scheduled. Thanks!   Discussed results with patient. CT max fac shows opacification to left maxillary sinus.  Patient appears well and requesting discharge before waiting for official read. Called radiologist numerous times for read. Pending CT maxfac read.  Patient being sent home on Augmentin and ENT follow up. Return precautions given.  Verbalizes understanding to plan. Agreeable to discharge.

## 2022-03-31 NOTE — ED PROVIDER NOTE - PATIENT PORTAL LINK FT
You can access the FollowMyHealth Patient Portal offered by [facility] by registering at the following website: [email or web address]. By joining [employer]’s FollowMyHealth portal, you will also be able to view your health information using other applications (apps) compatible with our system.

## 2022-03-31 NOTE — ED PROVIDER NOTE - PHYSICAL EXAMINATION
CONST: Well appearing in NAD  EYES:  Sclera and conjunctiva clear.  ENT: No nasal discharge. Oropharynx normal appearing, no erythema or exudates. Uvula midline.  NECK: Non-tender, no meningeal signs, supple  CARD: Normal S1 S2; Normal rate and rhythm  RESP: Equal BS B/L, No wheezes, rhonchi or rales. No distress  GI: Soft, non-tender, non-distended.  SKIN: Warm, dry, no acute rashes. Good turgor  NEURO: A&Ox3, No focal deficits. Strength 5/5 with no sensory deficits

## 2022-03-31 NOTE — ED PROVIDER NOTE - NS ED ATTENDING STATEMENT MOD
This was a shared visit with the FORTUNATO. I reviewed and verified the documentation and independently performed the documented:

## 2022-04-07 ENCOUNTER — APPOINTMENT (OUTPATIENT)
Age: 77
End: 2022-04-07

## 2022-04-14 ENCOUNTER — APPOINTMENT (OUTPATIENT)
Dept: GASTROENTEROLOGY | Facility: CLINIC | Age: 77
End: 2022-04-14
Payer: MEDICARE

## 2022-04-14 DIAGNOSIS — K22.4 DYSKINESIA OF ESOPHAGUS: ICD-10-CM

## 2022-04-14 PROCEDURE — 99442: CPT

## 2022-04-14 NOTE — REASON FOR VISIT
[Consultation] : a consultation visit [Spouse] : spouse [FreeTextEntry1] : NPA - Difficulty swallowing - Xray results

## 2022-04-14 NOTE — HISTORY OF PRESENT ILLNESS
[de-identified] : Patient is a 77 y/o female with ongoing GERD ( followed by Dr Auguste and Dr Avila- EGD done January) for ongoing trouble swallowing and food becoming stuck. She was seen by ENT and Barium study noted disordered Esophageal motility. Patient notes she has difficulty eating and feels food frequently becomes stuck.

## 2022-04-14 NOTE — ASSESSMENT
[FreeTextEntry1] : Patient is a 77 y/o female with ongoing GERD ( followed by Dr Auguste and Dr Avila- EGD done January) for ongoing trouble swallowing and food becoming stuck. She was seen by ENT and Barium study noted disordered Esophageal motility. Patient notes she has difficulty eating and feels food frequently becomes stuck. \par \par Esophageal dysmotility\par Book for EGD Manometry with Dr Lujan

## 2022-05-02 ENCOUNTER — EMERGENCY (EMERGENCY)
Facility: HOSPITAL | Age: 77
LOS: 0 days | Discharge: HOME | End: 2022-05-02
Attending: EMERGENCY MEDICINE | Admitting: EMERGENCY MEDICINE
Payer: MEDICARE

## 2022-05-02 VITALS
SYSTOLIC BLOOD PRESSURE: 138 MMHG | OXYGEN SATURATION: 96 % | HEIGHT: 62 IN | TEMPERATURE: 99 F | DIASTOLIC BLOOD PRESSURE: 85 MMHG | RESPIRATION RATE: 18 BRPM | HEART RATE: 86 BPM

## 2022-05-02 DIAGNOSIS — Z88.1 ALLERGY STATUS TO OTHER ANTIBIOTIC AGENTS STATUS: ICD-10-CM

## 2022-05-02 DIAGNOSIS — G56.03 CARPAL TUNNEL SYNDROME, BILATERAL UPPER LIMBS: Chronic | ICD-10-CM

## 2022-05-02 DIAGNOSIS — I10 ESSENTIAL (PRIMARY) HYPERTENSION: ICD-10-CM

## 2022-05-02 DIAGNOSIS — Z90.49 ACQUIRED ABSENCE OF OTHER SPECIFIED PARTS OF DIGESTIVE TRACT: Chronic | ICD-10-CM

## 2022-05-02 DIAGNOSIS — Z98.890 OTHER SPECIFIED POSTPROCEDURAL STATES: Chronic | ICD-10-CM

## 2022-05-02 DIAGNOSIS — F41.9 ANXIETY DISORDER, UNSPECIFIED: ICD-10-CM

## 2022-05-02 DIAGNOSIS — Z90.49 ACQUIRED ABSENCE OF OTHER SPECIFIED PARTS OF DIGESTIVE TRACT: ICD-10-CM

## 2022-05-02 DIAGNOSIS — G43.909 MIGRAINE, UNSPECIFIED, NOT INTRACTABLE, WITHOUT STATUS MIGRAINOSUS: ICD-10-CM

## 2022-05-02 DIAGNOSIS — K22.4 DYSKINESIA OF ESOPHAGUS: ICD-10-CM

## 2022-05-02 DIAGNOSIS — K21.9 GASTRO-ESOPHAGEAL REFLUX DISEASE WITHOUT ESOPHAGITIS: ICD-10-CM

## 2022-05-02 DIAGNOSIS — R13.10 DYSPHAGIA, UNSPECIFIED: ICD-10-CM

## 2022-05-02 DIAGNOSIS — Z97.4 PRESENCE OF EXTERNAL HEARING-AID: ICD-10-CM

## 2022-05-02 PROCEDURE — 99284 EMERGENCY DEPT VISIT MOD MDM: CPT

## 2022-05-02 NOTE — ED PROVIDER NOTE - PATIENT PORTAL LINK FT
You can access the FollowMyHealth Patient Portal offered by [facility] by registering at the following website: [email or web address]. By joining [employer]’s FollowMyHealth portal, you will also be able to view your health information using other applications (apps) compatible with our system.

## 2022-05-02 NOTE — ED PROVIDER NOTE - ATTENDING APP SHARED VISIT CONTRIBUTION OF CARE
I personally evaluated the patient. I reviewed the Resident’s or Physician Assistant’s note (as assigned above), and agree with the findings and plan except as documented in my note.     76 female here for inability to see her outpatient GI visit for ongoing workup of dysmotility issues. Complains of spitting up food post prandially but no vomiting or breathing, speech issues. No new symptoms, came to ED today because the PMD's office changed her outpatient eval date to one that is no longer comfortable for her.     ROS otherwise unremarkable    PE: patient in no distress. CV: pulses intact. CHEST: normal work of breathing. ABD: nondistended. SKIN: normal. EXT: FROM. NEURO: AAO 3 no focal deficits. HEENT: mucosa normal no drooling normal voice. no wasting.     Impression: dysmotility    Plan: reassurance, contact PMD, outpatient management.

## 2022-05-02 NOTE — ED PROVIDER NOTE - PHYSICAL EXAMINATION
CONST: Well appearing in NAD  EYES: Sclera and conjunctiva clear.  ENT: oropharynx patent. tolerating secretions.   CARD: Normal S1 S2; Normal rate and rhythm  RESP: Equal BS B/L, No wheezes, rhonchi or rales. No distress  GI: Soft, non-tender, non-distended.  MS: Normal ROM in all extremities. No edema of lower extremities, no calf pain, radial pulses 2+ bilaterally  SKIN: Warm, dry, no acute rashes. Good turgor  NEURO: A&Ox3, No focal deficits. Strength 5/5 with no sensory deficits

## 2022-05-02 NOTE — ED PROVIDER NOTE - CARE PROVIDER_API CALL
Chris Benson)  Gastroenterology; Internal Medicine  [address] [place] [address]  Phone: [phone]  Fax: [phone]  Follow Up Time: 1-3 Days

## 2022-05-02 NOTE — ED ADULT NURSE NOTE - HISTORY OF COVID-19 VACCINATION
Speech Therapy      Visit Type: Treatment and Discharge  -  Swallow    Precautions     - Medical precautions:  fall risk;.     - Oxygen: Oxymask.      - Basic: O2 and IV    - Lines in chart and on patient reviewed; cautions maintained through out session    - Safety measures: bed rails    - Cognition:         • Expression is verbal.          • Following commands intact.          • Safety judgement impaired.          • Awareness of deficits impaired.          • Problem solving is impaired.    - Affect/Behavior: cooperative and mad/angry    Current Diet: general and thin liquids      - Dentition: intact    - Feeding: feeds self    SUBJECTIVE  Pt expressed \"Don't get me pissed off\" when writer encouraging pt to participate in com/cog eval. Pt willing to participate in dysphagia treatment session but refused to complete com/cog eval due to concerns of \" and the  getting involved\".   Patient agreed to participate in therapy this date.   Patient/family goals: maximize function     OBJECTIVE     General Swallowing Observations    Oral Hygiene Condition Good   Current Feeding Method Mech Soft/Thin   Respiratory Support Currently in Use Oxymask   Flow (L) 2L   WBC 3.3 - low   Temps Afebrile   Recent CXR No recent since 12/30         Swallowing   No temperature spike noted.  Patient positioning: upright in bed  Pretrial vocal quality: normal  Volitional swallow: present    Consistencies:  Thin Liquid (straw):     - Amount given:2 oz   - Oral: intact   - Pharyngeal: intact  General Consistency:    - Amount given: 3x saltine crackers   - Oral: intact   - Pharyngeal: intact    Esophageal symptoms: not present. No pain associated with swallow.            ASSESSMENT                                                                          • Skilled therapy is not required due to swallow function appears consistent with prior level of function.   • Pt seen for skilled dysphagia treatment session for therapeutic trials of  more advanced solids to determine readiness for diet advancement. Pt completed initial clinical swallow evaluation on 12/31 and was recommended a regular diet and thin liquids once cleared by medical team for solids as pt was first placed on clear liquid diet s/p rectal bleed. Per chart review, mechanical soft diet placed by medical team once cleared for solids. RN provided verbal clearance for SLP to provide trials of hard solids and advance diet as tolerated for purposes of session. Pt was agreeable to consume saltine crackers and thin by straw. He is able to self-feed. Education for controlled rate and amounts provided at start of session prior to PO intake. Pt presented with a clinically functional oropharyngeal swallow function. Pt demonstrated adequate bolus formation, bolus control, timely oral transit, and functional mastication time. Trace oral residue noted between trials was effectively cleared with a liquid wash. Pt able to don/doff Oxymask independently between trials. Pt did have tendency to consume large bites but was able to provide rationale for small bites when prompted. Suspect pt's tendency to take large amounts is baseline. Appreciated suspected adequate laryngeal elevation/excursion upon palpation and no overt clinical s/s of aspiration or distress given thorough challenging; however SLP unable to identify silent aspiration within constraints of this exam. Should MD have concerns for silent aspiration then an instrumental swallow assessment would be strongly recommended. Overall pt is considered safe for diet advancement to a regular diet and thin liquids at this time. No further dysphagia intervention deemed clinically necessary as swallow function appears to be at baseline. Attempted to complete com/cog eval but pt continues to refuse participation despite education and encouragement. RN updated. SLP formally signing off at this time.    Requires SLP Follow Up: No    Discharge  Recommendations           SLP Identified Barriers to Discharge: Medical status   Recommendations for Discharge: SLP: Home with 24 hour supervison              • Potential barriers to progress:  Current medical conditions  • Progress: Improving as expected    Education Provided:   Learning assessment:  - Primary learner: patient  - Are they ready to learn: yes  - Preferred learning style: verbal  Education provided during session:  - Receiving education: patient  - dysphagia, aspiration precautions and cognition  Patient at end of session:    - location: in bed    - safety measures: call light within reach    - hand off to: nurse    PLAN  Frequency: Discharge    Interventions:  Discharge speech therapy services    Plan/Goal Agreement:  Patient agrees with goals and treatment plan    RECOMMENDATIONS     -Diet:          *general and thin liquids    -Medication Administration:         *with liquids (One at a time)    -Feeding Guidelines:          *feeds self, periodic/intermittent supervision, sit fully upright for all po intake, as tolerated, no talking while eating, eat slowly only when alert, alternate solids/liquids, small bites/sips, stop feeding if coughing observed and set up tray (REMINDERS FOR SMALL BITES/SIPS)    -Additional Swallow Recommendations:         *frequent oral care    -Speech Reviewed Swallow:         *with patient/family, with clinical caregivers and feeding guidelines updated in room    GOALS    Long Term Goals:   SWALLOW GOALS:   1. Patient will consume a regular diet (once cleared by medical team for solids) and thin liquids with less than 10% clinical s/s of aspiration to meet nutrition/hydration needs. GOAL MET 01/03        Documented in the chart in the following areas: Assessment.      Therapy procedure time and total treatment time can be found documented on the Time Entry flowsheet   Vaccine status unknown

## 2022-05-02 NOTE — ED PROVIDER NOTE - NSFOLLOWUPINSTRUCTIONS_ED_ALL_ED_FT
[facility]                                                                                                       Dysphagia       Dysphagia is trouble swallowing. This condition occurs when solids and liquids stick in a person's throat on the way down to the stomach, or when food takes longer to get to the stomach than usual.    You may have problems swallowing food, liquids, or both. You may also have pain while trying to swallow. It may take you more time and effort to swallow something.      What are the causes?    This condition may be caused by:  •Muscle problems. These may make it difficult for you to move food and liquids through the esophagus, which is the tube that connects your mouth to your stomach.    •Blockages. You may have ulcers, scar tissue, or inflammation that blocks the normal passage of food and liquids. Causes of these problems include:  •Acid reflux from your stomach into your esophagus (gastroesophageal reflux).      •Infections.      •Radiation treatment for cancer.      •Medicines taken without enough fluids to wash them down into your stomach.        •Stroke. This can affect the nerves and make it difficult to swallow.      •Nerve problems. These prevent signals from being sent to the muscles of your esophagus to squeeze (contract) and move what you swallow down to your stomach.      •Globus pharyngeus. This is a common problem that involves a feeling like something is stuck in your throat or a sense of trouble with swallowing, even though nothing is wrong with the swallowing passages.      •Certain conditions, such as cerebral palsy or Parkinson's disease.        What are the signs or symptoms?    Common symptoms of this condition include:  •A feeling that solids or liquids are stuck in your throat on the way down to the stomach.      •Pain while swallowing.      •Coughing or gagging while trying to swallow.      Other symptoms include:  •Food moving back from your stomach to your mouth (regurgitation).      •Noises coming from your throat.      •Chest discomfort when swallowing.      •A feeling of fullness when swallowing.      •Drooling, especially when the throat is blocked.      •Heartburn.        How is this diagnosed?    This condition may be diagnosed by:  •Barium swallow X-ray. In this test, you will swallow a white liquid that sticks to the inside of your esophagus. X-ray images are then taken.      •Endoscopy. In this test, a flexible telescope is inserted down your throat to look at your esophagus and your stomach.      •CT scans or an MRI.        How is this treated?    Treatment for dysphagia depends on the cause of this condition:  •If the dysphagia is caused by acid reflux or infection, medicines may be used. These may include antibiotics or heartburn medicines.      •If the dysphagia is caused by problems with the muscles, swallowing therapy may be used to help you strengthen your swallowing muscles. You may have to do specific exercises to strengthen the muscles or stretch them.      •If the dysphagia is caused by a blockage or mass, procedures to remove the blockage may be done. You may need surgery and a feeding tube.      You may need to make diet changes. Ask your health care provider for specific instructions.      Follow these instructions at home:    Medicines     •Take over-the-counter and prescription medicines only as told by your health care provider.      •If you were prescribed an antibiotic medicine, take it as told by your health care provider. Do not stop taking the antibiotic even if you start to feel better.        Eating and drinking      •Make any diet changes as told by your health care provider.      •Work with a diet and nutrition specialist (dietitian) to create an eating plan that will help you get the nutrients you need in order to stay healthy.      •Eat soft foods that are easier to swallow.      •Cut your food into small pieces and eat slowly. Take small bites.      •Eat and drink only when you are sitting upright.      • Do not drink alcohol or caffeine. If you need help quitting, ask your health care provider.      General instructions     •Check your weight every day to make sure you are not losing weight.      • Do not use any products that contain nicotine or tobacco. These products include cigarettes, chewing tobacco, and vaping devices, such as e-cigarettes. If you need help quitting, ask your health care provider.      •Keep all follow-up visits. This is important.        Contact a health care provider if:    •You lose weight because you cannot swallow.      •You cough when you drink liquids.      •You cough up partially digested food.        Get help right away if:    •You cannot swallow your saliva.      •You have shortness of breath, a fever, or both.      •Your voice is hoarse and you have trouble swallowing.      These symptoms may represent a serious problem that is an emergency. Do not wait to see if the symptoms will go away. Get medical help right away. Call your local emergency services (911 in the [place] Do not drive yourself to the hospital.       Summary    •Dysphagia is trouble swallowing. This condition occurs when solids and liquids stick in a person's throat on the way down to the stomach. You may cough or gag while trying to swallow.      •Dysphagia has many possible causes.      •Treatment for dysphagia depends on the cause of the condition.      •Keep all follow-up visits. This is important.      This information is not intended to replace advice given to you by your health care provider. Make sure you discuss any questions you have with your health care provider.      Document Revised: 08/07/2021 Document Reviewed: 08/07/2021    Elsevier Patient Education © 2022 [employer]    Follow up with your primary medical doctor in 1-2 days

## 2022-05-02 NOTE — ED PROVIDER NOTE - NS ED ROS FT
Constitutional: See HPI.  Eyes: No visual changes, eye pain or discharge.   ENMT: + chronic dysphagia. No hearing changes, pain, discharge or infections.  Cardiac: No SOB or edema. No chest pain with exertion.  Respiratory: No cough or respiratory distress.   GI: No nausea, vomiting, diarrhea or abdominal pain.  : No dysuria, frequency or burning. No Discharge  MS: No myalgia, muscle weakness, joint pain or back pain.  Neuro: No headache or weakness.   Skin: No skin rash.  Except as documented in the HPI, all other systems are negative.

## 2022-05-02 NOTE — ED PROVIDER NOTE - OBJECTIVE STATEMENT
76 y.o female w/ hx of HTN, GERD presents to the ED for evaluation of difficulty swallowing x 1 year. Saw ENT, had xr esophageal motility which showed moderate disordered esophageal motility.  Has f/u with Gi, plan for upper endoscopy 5/19.  States no changes in sxs, just frustrated she couldn't see GI sooner prompting visit to the ED.  States sxs she has today have been present for past year. No change in quality or character.  NO abd pain, chest pain, dyspnea, fever, chills.  No further complaints. Pt is able to tolerate liquids and solids.  NO significant weight loss.

## 2022-05-09 ENCOUNTER — LABORATORY RESULT (OUTPATIENT)
Age: 77
End: 2022-05-09

## 2022-05-12 ENCOUNTER — OUTPATIENT (OUTPATIENT)
Dept: OUTPATIENT SERVICES | Facility: HOSPITAL | Age: 77
LOS: 1 days | Discharge: HOME | End: 2022-05-12
Payer: MEDICARE

## 2022-05-12 ENCOUNTER — TRANSCRIPTION ENCOUNTER (OUTPATIENT)
Age: 77
End: 2022-05-12

## 2022-05-12 VITALS
RESPIRATION RATE: 18 BRPM | SYSTOLIC BLOOD PRESSURE: 175 MMHG | DIASTOLIC BLOOD PRESSURE: 79 MMHG | OXYGEN SATURATION: 100 % | HEART RATE: 67 BPM

## 2022-05-12 VITALS
TEMPERATURE: 97 F | RESPIRATION RATE: 18 BRPM | HEIGHT: 62 IN | SYSTOLIC BLOOD PRESSURE: 174 MMHG | WEIGHT: 126.1 LBS | HEART RATE: 80 BPM | DIASTOLIC BLOOD PRESSURE: 84 MMHG

## 2022-05-12 DIAGNOSIS — Z98.890 OTHER SPECIFIED POSTPROCEDURAL STATES: Chronic | ICD-10-CM

## 2022-05-12 DIAGNOSIS — Z90.49 ACQUIRED ABSENCE OF OTHER SPECIFIED PARTS OF DIGESTIVE TRACT: Chronic | ICD-10-CM

## 2022-05-12 DIAGNOSIS — G56.03 CARPAL TUNNEL SYNDROME, BILATERAL UPPER LIMBS: Chronic | ICD-10-CM

## 2022-05-12 PROCEDURE — 91010 ESOPHAGUS MOTILITY STUDY: CPT | Mod: 26

## 2022-05-12 NOTE — H&P PST ADULT - ASSESSMENT
77 y/o female with ongoing GERD ( followed by Dr Auguste and Dr Avila- EGD done January) for ongoing trouble swallowing and food becoming stuck. She was seen by ENT and Barium study noted disordered Esophageal motility.  Here for esophageal manometry.

## 2022-05-12 NOTE — ASU PATIENT PROFILE, ADULT - FALL HARM RISK - UNIVERSAL INTERVENTIONS
Bed in lowest position, wheels locked, appropriate side rails in place/Call bell, personal items and telephone in reach/Instruct patient to call for assistance before getting out of bed or chair/Non-slip footwear when patient is out of bed/[place] to call system/Physically safe environment - no spills, clutter or unnecessary equipment/Purposeful Proactive Rounding/Room/bathroom lighting operational, light cord in reach

## 2022-05-12 NOTE — ASU DISCHARGE PLAN (ADULT/PEDIATRIC) - NS MD DC FALL RISK RISK
For information on Fall & Injury Prevention, visit: [email or web address] OR  [email or web address] OR  https://www.cdc.gov/steadi/patient.html

## 2022-05-22 DIAGNOSIS — R13.10 DYSPHAGIA, UNSPECIFIED: ICD-10-CM

## 2022-06-06 ENCOUNTER — LABORATORY RESULT (OUTPATIENT)
Age: 77
End: 2022-06-06

## 2022-06-09 ENCOUNTER — TRANSCRIPTION ENCOUNTER (OUTPATIENT)
Age: 77
End: 2022-06-09

## 2022-06-09 ENCOUNTER — RESULT REVIEW (OUTPATIENT)
Age: 77
End: 2022-06-09

## 2022-06-09 ENCOUNTER — OUTPATIENT (OUTPATIENT)
Dept: OUTPATIENT SERVICES | Facility: HOSPITAL | Age: 77
LOS: 1 days | Discharge: HOME | End: 2022-06-09
Payer: MEDICARE

## 2022-06-09 VITALS
RESPIRATION RATE: 18 BRPM | DIASTOLIC BLOOD PRESSURE: 96 MMHG | SYSTOLIC BLOOD PRESSURE: 153 MMHG | WEIGHT: 119.93 LBS | HEART RATE: 72 BPM | TEMPERATURE: 97 F | HEIGHT: 62 IN

## 2022-06-09 VITALS
DIASTOLIC BLOOD PRESSURE: 84 MMHG | OXYGEN SATURATION: 97 % | RESPIRATION RATE: 20 BRPM | HEART RATE: 64 BPM | SYSTOLIC BLOOD PRESSURE: 150 MMHG

## 2022-06-09 DIAGNOSIS — Z98.890 OTHER SPECIFIED POSTPROCEDURAL STATES: Chronic | ICD-10-CM

## 2022-06-09 DIAGNOSIS — G56.03 CARPAL TUNNEL SYNDROME, BILATERAL UPPER LIMBS: Chronic | ICD-10-CM

## 2022-06-09 DIAGNOSIS — Z90.49 ACQUIRED ABSENCE OF OTHER SPECIFIED PARTS OF DIGESTIVE TRACT: Chronic | ICD-10-CM

## 2022-06-09 PROCEDURE — 43239 EGD BIOPSY SINGLE/MULTIPLE: CPT

## 2022-06-09 PROCEDURE — 88305 TISSUE EXAM BY PATHOLOGIST: CPT | Mod: 26

## 2022-06-09 PROCEDURE — 91035 G-ESOPH REFLX TST W/ELECTROD: CPT | Mod: 26

## 2022-06-09 PROCEDURE — 88312 SPECIAL STAINS GROUP 1: CPT | Mod: 26

## 2022-06-09 RX ORDER — FAMOTIDINE 10 MG/ML
0 INJECTION INTRAVENOUS
Qty: 0 | Refills: 0 | DISCHARGE

## 2022-06-09 NOTE — ASU DISCHARGE PLAN (ADULT/PEDIATRIC) - CALL YOUR DOCTOR IF YOU HAVE ANY OF THE FOLLOWING:
none known
Bleeding that does not stop/Pain not relieved by Medications/Numbness, tingling, color or temperature change to extremity/Nausea and vomiting that does not stop/Excessive diarrhea/Inability to tolerate liquids or foods/Increased irritability or sluggishness

## 2022-06-09 NOTE — H&P PST ADULT - PATIENT'S SEXUAL ORIENTATION
SHIFT SUMMARY
AOX2-SELF, HOSPITAL, FOLLOWING DIRECTIONS. DID BECOME AGITATED/AGRESSIVE 1X
LAST NIGHT, HOWEVER WAS MORE CALM & COOPERATIVE AFTER RECIEVING PRN IM
ZYPREXA, READ PREVIOUS NOTE. VSS. REPORTED PAIN IN HIP, DENIED NEED FOR
MEDICATION. NO S/SX N/V OR DYSPNEA. HAD MULTIPLE INCONT & CONTINENT VOIDS
TONIGHT. RECIEVING 1/2 NS @100ML/HR. CALL LIGHT & BED ALARM IN PLACE. WILL
MONITOR. Heterosexual

## 2022-06-09 NOTE — H&P PST ADULT - HISTORY OF PRESENT ILLNESS
76 F came for outpatient EGD for GERD. 76 F came for outpatient EGD with BRAVO capsule deployment for GERD.

## 2022-06-09 NOTE — ASU DISCHARGE PLAN (ADULT/PEDIATRIC) - NS MD DC FALL RISK RISK
For information on Fall & Injury Prevention, visit: [email or web address] OR  [email or web address] OR  https://www.cdc.gov/steadi/patient.html

## 2022-06-10 LAB — SURGICAL PATHOLOGY STUDY: SIGNIFICANT CHANGE UP

## 2022-06-14 DIAGNOSIS — K44.9 DIAPHRAGMATIC HERNIA WITHOUT OBSTRUCTION OR GANGRENE: ICD-10-CM

## 2022-06-14 DIAGNOSIS — Z88.8 ALLERGY STATUS TO OTHER DRUGS, MEDICAMENTS AND BIOLOGICAL SUBSTANCES STATUS: ICD-10-CM

## 2022-06-14 DIAGNOSIS — R13.10 DYSPHAGIA, UNSPECIFIED: ICD-10-CM

## 2022-06-14 DIAGNOSIS — K21.9 GASTRO-ESOPHAGEAL REFLUX DISEASE WITHOUT ESOPHAGITIS: ICD-10-CM

## 2022-06-14 DIAGNOSIS — G43.909 MIGRAINE, UNSPECIFIED, NOT INTRACTABLE, WITHOUT STATUS MIGRAINOSUS: ICD-10-CM

## 2022-06-14 DIAGNOSIS — Z90.49 ACQUIRED ABSENCE OF OTHER SPECIFIED PARTS OF DIGESTIVE TRACT: ICD-10-CM

## 2022-06-14 DIAGNOSIS — H91.90 UNSPECIFIED HEARING LOSS, UNSPECIFIED EAR: ICD-10-CM

## 2022-06-14 DIAGNOSIS — I10 ESSENTIAL (PRIMARY) HYPERTENSION: ICD-10-CM

## 2022-06-14 DIAGNOSIS — K29.50 UNSPECIFIED CHRONIC GASTRITIS WITHOUT BLEEDING: ICD-10-CM

## 2022-06-27 NOTE — ED PROVIDER NOTE - CLINICAL SUMMARY MEDICAL DECISION MAKING FREE TEXT BOX
Imiquimod Pregnancy And Lactation Text: This medication is Pregnancy Category C. It is unknown if this medication is excreted in breast milk. 76 female for difficulty with accessing the outpatient healthcare system. Has no acute emergent condition after screening exam in the ED. Discussed options with her, she became upset about difficulties contacting her PMD. Called PMD's service to communicate this on behalf of the patient. Provided her with her reports of recent workup which she claims she was missing. PMD's service clinician reassured that the office would be in touch with the patient promptly to discuss scheduling options which she was pleased with. Will discharge with outpatient management and return and follow up instructions.

## 2022-06-27 NOTE — ED PROVIDER NOTE - NS ED ATTENDING STATEMENT MOD
Detail Level: Detailed General Sunscreen Counseling: I recommended a broad-spectrum sunscreen with an SPF of 30 or higher.  I explained that SPF 30 sunscreens block approximately 97 percent of the sun's harmful rays.  Sunscreens should be applied at least 15 minutes prior to expected sun exposure and then every 2 hours after that as long as sun exposure continues. If swimming or exercising sunscreen should be reapplied every 45 minutes to an hour after getting wet or sweating.  One ounce, or the equivalent of a shot glass full of sunscreen, is adequate to protect the skin not covered by a bathing suit. I also recommended a lip balm with a sunscreen as well. Sun protective clothing can be used in lieu of sunscreen but must be worn the entire time you are exposed to the sun's rays. I have personally seen and examined this patient.  I have fully participated in the care of this patient. I have reviewed all pertinent clinical information, including history, physical exam, plan and the Resident’s note and agree except as noted.

## 2022-07-07 ENCOUNTER — APPOINTMENT (OUTPATIENT)
Dept: GASTROENTEROLOGY | Facility: CLINIC | Age: 77
End: 2022-07-07

## 2022-07-11 ENCOUNTER — APPOINTMENT (OUTPATIENT)
Dept: GASTROENTEROLOGY | Facility: CLINIC | Age: 77
End: 2022-07-11

## 2022-07-11 VITALS
HEIGHT: 62 IN | WEIGHT: 130 LBS | BODY MASS INDEX: 23.92 KG/M2 | DIASTOLIC BLOOD PRESSURE: 85 MMHG | SYSTOLIC BLOOD PRESSURE: 180 MMHG | HEART RATE: 80 BPM

## 2022-07-11 DIAGNOSIS — R13.10 DYSPHAGIA, UNSPECIFIED: ICD-10-CM

## 2022-07-11 PROCEDURE — 99214 OFFICE O/P EST MOD 30 MIN: CPT

## 2022-07-11 RX ORDER — FAMOTIDINE 40 MG/1
40 TABLET, FILM COATED ORAL
Qty: 30 | Refills: 3 | Status: DISCONTINUED | COMMUNITY
Start: 2021-10-21 | End: 2022-07-11

## 2022-07-11 RX ORDER — NORTRIPTYLINE HYDROCHLORIDE 10 MG/1
10 CAPSULE ORAL
Qty: 60 | Refills: 0 | Status: DISCONTINUED | COMMUNITY
Start: 2017-04-19 | End: 2022-07-11

## 2022-07-11 RX ORDER — DIAZEPAM 10 MG/1
10 TABLET ORAL
Qty: 2 | Refills: 0 | Status: DISCONTINUED | COMMUNITY
Start: 2021-04-21 | End: 2022-07-11

## 2022-07-11 RX ORDER — SERTRALINE HYDROCHLORIDE 50 MG/1
50 TABLET, FILM COATED ORAL
Qty: 30 | Refills: 0 | Status: DISCONTINUED | COMMUNITY
Start: 2017-01-19 | End: 2022-07-11

## 2022-07-11 RX ORDER — FAMOTIDINE 40 MG/1
40 TABLET, FILM COATED ORAL
Qty: 30 | Refills: 3 | Status: DISCONTINUED | COMMUNITY
Start: 2021-11-18 | End: 2022-07-11

## 2022-07-11 RX ORDER — METHYLPREDNISOLONE 4 MG/1
4 TABLET ORAL
Qty: 1 | Refills: 0 | Status: DISCONTINUED | COMMUNITY
Start: 2021-09-30 | End: 2022-07-11

## 2022-07-11 RX ORDER — FLUCONAZOLE 100 MG/1
100 TABLET ORAL
Qty: 11 | Refills: 0 | Status: DISCONTINUED | COMMUNITY
Start: 2021-10-21 | End: 2022-07-11

## 2022-07-11 RX ORDER — SODIUM CHLORIDE 0.65 %
0.65 AEROSOL, SPRAY (ML) NASAL
Qty: 1 | Refills: 2 | Status: DISCONTINUED | COMMUNITY
Start: 2020-10-06 | End: 2022-07-11

## 2022-07-11 RX ORDER — GUAIFENESIN 600 MG/1
600 TABLET, EXTENDED RELEASE ORAL
Qty: 60 | Refills: 2 | Status: DISCONTINUED | COMMUNITY
Start: 2021-11-18 | End: 2022-07-11

## 2022-07-11 RX ORDER — TIZANIDINE 2 MG/1
2 TABLET ORAL
Qty: 60 | Refills: 0 | Status: DISCONTINUED | COMMUNITY
Start: 2017-04-14 | End: 2022-07-11

## 2022-07-11 RX ORDER — PREDNISONE 20 MG/1
20 TABLET ORAL DAILY
Qty: 14 | Refills: 0 | Status: DISCONTINUED | COMMUNITY
Start: 2020-03-04 | End: 2022-07-11

## 2022-07-11 RX ORDER — PANTOPRAZOLE 40 MG/1
40 TABLET, DELAYED RELEASE ORAL DAILY
Qty: 30 | Refills: 0 | Status: DISCONTINUED | COMMUNITY
Start: 2019-06-10 | End: 2022-07-11

## 2022-07-11 RX ORDER — TOPIRAMATE 25 MG/1
25 TABLET, FILM COATED ORAL
Qty: 30 | Refills: 0 | Status: DISCONTINUED | COMMUNITY
Start: 2019-11-20 | End: 2022-07-11

## 2022-07-11 RX ORDER — PANTOPRAZOLE 40 MG/1
40 TABLET, DELAYED RELEASE ORAL
Qty: 30 | Refills: 3 | Status: DISCONTINUED | COMMUNITY
Start: 2021-07-22 | End: 2022-07-11

## 2022-07-11 RX ORDER — PREGABALIN 75 MG/1
75 CAPSULE ORAL
Qty: 90 | Refills: 0 | Status: DISCONTINUED | COMMUNITY
Start: 2017-04-16 | End: 2022-07-11

## 2022-07-11 RX ORDER — IBUPROFEN 400 MG/1
400 TABLET, FILM COATED ORAL
Qty: 20 | Refills: 0 | Status: DISCONTINUED | COMMUNITY
Start: 2017-06-22 | End: 2022-07-11

## 2022-07-11 RX ORDER — METHYLPREDNISOLONE 4 MG/1
4 TABLET ORAL
Qty: 1 | Refills: 0 | Status: DISCONTINUED | COMMUNITY
Start: 2020-07-27 | End: 2022-07-11

## 2022-07-11 RX ORDER — METFORMIN HYDROCHLORIDE 500 MG/1
500 TABLET, COATED ORAL
Qty: 30 | Refills: 0 | Status: DISCONTINUED | COMMUNITY
Start: 2017-05-05 | End: 2022-07-11

## 2022-07-11 RX ORDER — SUMATRIPTAN 100 MG/1
100 TABLET, FILM COATED ORAL
Qty: 12 | Refills: 3 | Status: DISCONTINUED | COMMUNITY
Start: 2019-06-11 | End: 2022-07-11

## 2022-07-11 RX ORDER — OXYCODONE AND ACETAMINOPHEN 5; 325 MG/1; MG/1
5-325 TABLET ORAL
Qty: 9 | Refills: 0 | Status: DISCONTINUED | COMMUNITY
Start: 2017-06-22 | End: 2022-07-11

## 2022-07-11 RX ORDER — DOXYCYCLINE HYCLATE 100 MG/1
100 CAPSULE ORAL
Qty: 42 | Refills: 0 | Status: DISCONTINUED | COMMUNITY
Start: 2020-07-27 | End: 2022-07-11

## 2022-07-11 RX ORDER — PANTOPRAZOLE 40 MG/1
40 TABLET, DELAYED RELEASE ORAL
Qty: 30 | Refills: 3 | Status: DISCONTINUED | COMMUNITY
Start: 2021-10-21 | End: 2022-07-11

## 2022-07-11 RX ORDER — DIAZEPAM 10 MG/1
10 TABLET ORAL
Qty: 2 | Refills: 0 | Status: DISCONTINUED | COMMUNITY
Start: 2021-04-20 | End: 2022-07-11

## 2022-07-11 RX ORDER — FAMOTIDINE 40 MG/1
40 TABLET, FILM COATED ORAL
Qty: 90 | Refills: 0 | Status: DISCONTINUED | COMMUNITY
Start: 2020-11-06 | End: 2022-07-11

## 2022-07-11 RX ORDER — BACLOFEN 10 MG/1
10 TABLET ORAL
Qty: 21 | Refills: 0 | Status: DISCONTINUED | COMMUNITY
Start: 2017-06-22 | End: 2022-07-11

## 2022-07-11 RX ORDER — CLINDAMYCIN HYDROCHLORIDE 300 MG/1
300 CAPSULE ORAL EVERY 6 HOURS
Qty: 40 | Refills: 2 | Status: DISCONTINUED | COMMUNITY
Start: 2022-01-26 | End: 2022-07-11

## 2022-07-11 RX ORDER — DOXYCYCLINE HYCLATE 100 MG/1
100 CAPSULE ORAL
Qty: 14 | Refills: 0 | Status: DISCONTINUED | COMMUNITY
Start: 2020-01-27 | End: 2022-07-11

## 2022-07-11 RX ORDER — MECLIZINE HYDROCHLORIDE 12.5 MG/1
12.5 TABLET ORAL
Qty: 16 | Refills: 0 | Status: DISCONTINUED | COMMUNITY
Start: 2017-05-04 | End: 2022-07-11

## 2022-07-11 RX ORDER — AZITHROMYCIN 250 MG/1
250 TABLET, FILM COATED ORAL
Qty: 6 | Refills: 0 | Status: DISCONTINUED | COMMUNITY
Start: 2021-11-18 | End: 2022-07-11

## 2022-07-11 RX ORDER — ERENUMAB-AOOE 140 MG/ML
140 INJECTION, SOLUTION SUBCUTANEOUS
Qty: 1 | Refills: 5 | Status: DISCONTINUED | COMMUNITY
Start: 2019-06-11 | End: 2022-07-11

## 2022-07-11 RX ORDER — FLUTICASONE PROPIONATE 50 UG/1
50 SPRAY, METERED NASAL DAILY
Qty: 2 | Refills: 3 | Status: DISCONTINUED | COMMUNITY
Start: 2017-07-20 | End: 2022-07-11

## 2022-07-11 RX ORDER — MECLIZINE HYDROCHLORIDE 25 MG/1
25 TABLET ORAL
Qty: 16 | Refills: 0 | Status: DISCONTINUED | COMMUNITY
Start: 2017-05-11 | End: 2022-07-11

## 2022-07-11 RX ORDER — AZITHROMYCIN 250 MG/1
250 TABLET, FILM COATED ORAL
Qty: 6 | Refills: 0 | Status: DISCONTINUED | COMMUNITY
Start: 2021-06-17 | End: 2022-07-11

## 2022-07-11 RX ORDER — LEVOCETIRIZINE DIHYDROCHLORIDE 5 MG/1
5 TABLET ORAL DAILY
Qty: 30 | Refills: 2 | Status: DISCONTINUED | COMMUNITY
Start: 2021-06-16 | End: 2022-07-11

## 2022-07-11 RX ORDER — SERTRALINE 25 MG/1
25 TABLET, FILM COATED ORAL
Qty: 30 | Refills: 0 | Status: DISCONTINUED | COMMUNITY
Start: 2017-01-19 | End: 2022-07-11

## 2022-07-11 RX ORDER — METHYLPREDNISOLONE 4 MG/1
4 TABLET ORAL
Qty: 1 | Refills: 0 | Status: DISCONTINUED | COMMUNITY
Start: 2020-01-27 | End: 2022-07-11

## 2022-07-11 RX ORDER — ATORVASTATIN CALCIUM 40 MG/1
40 TABLET, FILM COATED ORAL
Qty: 30 | Refills: 0 | Status: DISCONTINUED | COMMUNITY
Start: 2017-05-05 | End: 2022-07-11

## 2022-07-11 RX ORDER — GABAPENTIN 300 MG/1
300 CAPSULE ORAL
Qty: 90 | Refills: 0 | Status: DISCONTINUED | COMMUNITY
Start: 2017-06-28 | End: 2022-07-11

## 2022-07-11 NOTE — PHYSICAL EXAM
[General Appearance - Alert] : alert [General Appearance - Well Nourished] : well nourished [Sclera] : the sclera and conjunctiva were normal [Outer Ear] : the ears and nose were normal in appearance [Hearing Threshold Finger Rub Not Sanders] : hearing was normal [Neck Appearance] : the appearance of the neck was normal [Exaggerated Use Of Accessory Muscles For Inspiration] : no accessory muscle use [Apical Impulse] : the apical impulse was normal [Auscultation Breath Sounds / Voice Sounds] : lungs were clear to auscultation bilaterally [Heart Sounds] : normal S1 and S2 [Murmurs] : no murmurs [Bowel Sounds] : normal bowel sounds [Abdomen Tenderness] : non-tender [] : no hepato-splenomegaly

## 2022-07-12 NOTE — HISTORY OF PRESENT ILLNESS
[Test: ___] : [unfilled] [_________] : Performed [unfilled] [de-identified] : 4/14/22 w/Dr. Benson [de-identified] : 77-year-old female patient who states that her main problem is loss of weight.  She was initially referred for GERD and probable dysphagia.  Today she denies any dysphagia.  She states that food does not get stuck.  She may have noticed a decrease in appetite.  She gets occasional reflux about once a week and it is mild in intensity.  Her bowel movements are normal and there is no blood in the stools she has no abdominal pain.  She indicates that she had a colonoscopy a few years ago.

## 2022-07-12 NOTE — ASSESSMENT
[FreeTextEntry1] : Today the patient does not complain of any dysphagia her upper endoscopy was normal except for a small hiatus hernia.  Esophageal motility was normal.  Barium study was normal.  Bravo study report consistent with GERDto d.\par With regard to weight loss we will monitor her weight for the next 3 months.  States she may have lost some weight because stress related to loss of relatives.  TSH is within normal limits\par GERD.  Will increase pantoprazole to twice per day before breakfast and before dinner.  To stop famotidine.\par Follow-up and reevaluation in 3 months.

## 2022-07-20 ENCOUNTER — APPOINTMENT (OUTPATIENT)
Dept: GASTROENTEROLOGY | Facility: CLINIC | Age: 77
End: 2022-07-20

## 2022-07-21 ENCOUNTER — APPOINTMENT (OUTPATIENT)
Dept: NEUROLOGY | Facility: CLINIC | Age: 77
End: 2022-07-21

## 2022-07-21 PROCEDURE — 99214 OFFICE O/P EST MOD 30 MIN: CPT

## 2022-07-21 NOTE — HISTORY OF PRESENT ILLNESS
[FreeTextEntry1] : Patient is a 77 year old woman who was initially seen on 5/6/21 for symptoms of memory loss, forgetfulness, word finding difficulty, forgetting names and passwords. MRI of brain was done which was felt to be negative for her age. She was referred to neuropsychology for a more comprehensive evaluation. There was felt to be a language barrier issue. She requested a Romansh speaking neuropsychologist, so consultation was not done. Last time, she was advised to eat a plant-based veggie diet to control blood pressure, cholesterol. No smoke and No alcohol is advised and to stimulate herself with cross word puzzles and exercise regularly.

## 2022-07-21 NOTE — PHYSICAL EXAM
[FreeTextEntry1] : \par Head:  Normocephalic, atraumatic. Negative TA tenderness/prominence. \par \par NEUROLOGICAL EXAMINATION: \par \par Mental Status: Mini-Mental State Examination is 25/30. Difficulty with Language barrier  \par \par Cranial Nerves Cranial Nerves: \par \par II, III, IV, VI: Pupils are equal, round, and reactive to light and accommodation. No evidence of afferent pupillary defect. Visual fields are full to confrontation. Eye movements are full without evidence of nystagmus or internuclear ophthalmoplegia. Funduscopic examination reveals sharp disc margins. \par \par V: Normal jaw movements. Normal facial sensation. \par \par VII: Normal facial motor testing. \par \par VIII: hearing deficient  \par \par IX, X: Palate moves symmetrically. No dysarthria. \par \par XI: Normal shoulder shrug and sternocleidomastoid power. \par \par XII: Tongue appears normal and protrudes in the midline. \par \par Motor: Normal bulk, tone, and power throughout. \par \par Muscle Stretch Reflexes (right/left): 2+ symmetrical. \par \par Plantar Responses: Flexor bilaterally. \par \par Coordination: Normal finger to nose and heel to shin testing, no truncal ataxia and no tremor. \par \par Sensation: Normal primary sensation. Normal double simultaneous stimulation. \par \par Gait and Station: Normal base, stride, and turning. Normal toe and heel walking. Normal tandem. Negative Romberg. \par

## 2022-07-21 NOTE — ASSESSMENT
[FreeTextEntry1] : Impression: Mild cognitive impairment. A referral was made to the neuropsychologist department at [facility] to see if they can provide a [place] speaking neuropsychologist to evaluate mental status. \par \par Instructions: SAK OM scribe\par Entered by Giancarlo Fuller acting as scribe for Dr. Greenberg.\par \par \par The documentation recorded by the scribe, in my presence, accurately reflects the service I personally performed, and the decisions made by me with my edits as appropriate. \par Jarrett Greenberg MD, FAAN, FACP\par Diplomate American Board of Psychiatry & Neurology\par \par

## 2022-08-25 ENCOUNTER — APPOINTMENT (OUTPATIENT)
Dept: NEUROPSYCHOLOGY | Facility: CLINIC | Age: 77
End: 2022-08-25

## 2022-08-30 NOTE — PHYSICAL EXAM
[Normal] : mucosa is normal [Midline] : trachea located in midline position [facility]

## 2022-09-01 ENCOUNTER — APPOINTMENT (OUTPATIENT)
Dept: NEUROPSYCHOLOGY | Facility: CLINIC | Age: 77
End: 2022-09-01

## 2022-09-08 ENCOUNTER — APPOINTMENT (OUTPATIENT)
Dept: NEUROPSYCHOLOGY | Facility: CLINIC | Age: 77
End: 2022-09-08

## 2022-09-09 ENCOUNTER — APPOINTMENT (OUTPATIENT)
Dept: GASTROENTEROLOGY | Facility: CLINIC | Age: 77
End: 2022-09-09

## 2022-09-09 VITALS
SYSTOLIC BLOOD PRESSURE: 147 MMHG | DIASTOLIC BLOOD PRESSURE: 102 MMHG | BODY MASS INDEX: 24.22 KG/M2 | HEIGHT: 62 IN | WEIGHT: 131.6 LBS | OXYGEN SATURATION: 98 % | HEART RATE: 73 BPM

## 2022-09-09 PROCEDURE — 99213 OFFICE O/P EST LOW 20 MIN: CPT

## 2022-09-09 NOTE — ASSESSMENT
[FreeTextEntry1] :  Her weight is steady compared to her last visit.  But patient continues to complain of symptoms despite increasing her pantoprazole to twice a day.  We will add sucralfate to her treatment.\par Patient indicates that she brings out a copious amount of phlegm. To  obtain a pulmonary consult to rule out any pulmonary cause for her symptoms.\par Evaluate in 3 months.

## 2022-09-09 NOTE — PHYSICAL EXAM
[General Appearance - Alert] : alert [General Appearance - Well Nourished] : well nourished [General Appearance - Well Developed] : well developed [Outer Ear] : the ears and nose were normal in appearance [Neck Appearance] : the appearance of the neck was normal [Neck Cervical Mass (___cm)] : no neck mass was observed [Auscultation Breath Sounds / Voice Sounds] : lungs were clear to auscultation bilaterally [Chest Palpation] : palpation of the chest revealed no abnormalities [Apical Impulse] : the apical impulse was normal [Heart Sounds] : normal S1 and S2 [Murmurs] : no murmurs [Bowel Sounds] : normal bowel sounds [Abdomen Tenderness] : non-tender [] : no hepato-splenomegaly

## 2022-09-09 NOTE — HISTORY OF PRESENT ILLNESS
[___ Month(s) Ago] : [unfilled] month(s) ago [de-identified] : 7/11/22 [de-identified] : Patient being followed for GERD symptoms Traore showed findings consistent with GERD.  Her medication was increased to pantoprazole twice a day before breakfast and before dinner.  But patient indicates that her symptoms have increased.  On close questioning she says she brings up phlegm.

## 2022-10-06 ENCOUNTER — APPOINTMENT (OUTPATIENT)
Dept: NEUROLOGY | Facility: CLINIC | Age: 77
End: 2022-10-06

## 2022-10-10 ENCOUNTER — NON-APPOINTMENT (OUTPATIENT)
Age: 77
End: 2022-10-10

## 2022-10-10 ENCOUNTER — APPOINTMENT (OUTPATIENT)
Age: 77
End: 2022-10-10

## 2022-10-10 VITALS
WEIGHT: 130 LBS | HEART RATE: 60 BPM | DIASTOLIC BLOOD PRESSURE: 80 MMHG | BODY MASS INDEX: 23.92 KG/M2 | SYSTOLIC BLOOD PRESSURE: 130 MMHG | HEIGHT: 62 IN | RESPIRATION RATE: 14 BRPM | OXYGEN SATURATION: 96 %

## 2022-10-10 PROCEDURE — 94010 BREATHING CAPACITY TEST: CPT

## 2022-10-10 PROCEDURE — 99203 OFFICE O/P NEW LOW 30 MIN: CPT | Mod: 25

## 2022-10-10 NOTE — HISTORY OF PRESENT ILLNESS
[Initial Evaluation] : an initial evaluation of [Cough] : cough [Productive Cough] : productive cough [Non-Productive Cough] : non-productive cough [Nasal Congestion] : nasal congestion [Rhinorrhea] : rhinorrhea [Postnasal Drainage] : postnasal drainage [Currently Experiencing] : The patient is currently experiencing symptoms.

## 2022-10-10 NOTE — ED PROVIDER NOTE - PRINCIPAL DIAGNOSIS
No deformities present Headache No deformities present No deformities present No deformities present No deformities present No deformities present No deformities present No deformities present No deformities present No deformities present No deformities present

## 2022-11-03 ENCOUNTER — APPOINTMENT (OUTPATIENT)
Dept: NEUROLOGY | Facility: CLINIC | Age: 77
End: 2022-11-03

## 2022-11-03 PROCEDURE — 99212 OFFICE O/P EST SF 10 MIN: CPT

## 2022-11-03 NOTE — PHYSICAL EXAM
[Normal Coordination] : normal coordination [Normal Sensation] : normal sensation [Orientated] : orientated [Able to Communicate] : able to communicate [Well Developed] : well developed [Well Nourished] : well nourished

## 2022-11-03 NOTE — HISTORY OF PRESENT ILLNESS
[FreeTextEntry1] : ORIGINAL PRESENTATION:  Patient is a 77 year old woman who was initially seen on 5/6/21 for symptoms of memory loss, forgetfulness, word finding difficulty, forgetting names and passwords. MRI of brain was done which was felt to be negative for her age. She was referred to neuropsychology for a more comprehensive evaluation. There was felt to be a language barrier issue. She requested a [place]ic speaking neuropsychologist, so consultation was not done. Last time, she was advised to eat a plant-based veggie diet to control blood pressure, cholesterol. No smoke and No alcohol is advised and to stimulate herself with cross word puzzles and exercise regularly.  \par \par TODAY:  I had the pleasure of seeing Ms. Breen accompanied by her  today in follow up.  Her previous history and physical findings have been reviewed.\par \par She is under our care for possible dementia which is a chronic condition she is receiving continuing active treatment for.  She has completed neuropsychological testing in order to be properly assessed for dementia and has a f/u with them next week to discuss the results.  Both the patient and her  state there has been no progression with respect to her condition since her last visit.  We advised once we have the results we will revisit with her to discuss medication options if indicated and they are agreeable.

## 2022-11-03 NOTE — REASON FOR VISIT
[Follow-Up Visit] : a follow-up pain management visit [Follow-Up: _____] : a [unfilled] follow-up visit [FreeTextEntry1] : FOLLOW UP

## 2022-11-03 NOTE — ASSESSMENT
[FreeTextEntry1] : 77 year old female with mild cognitive impairment.  She will see the neuropsychologist as scheduled next week to review the results of her testing.  She will f/u in 3 weeks for re evaluation and at that time medication will be discussed if needed.\par \par I personally reviewed with the PA, this patient's history and physical exam findings, as documented above. I have discussed the relevant areas of concern, having direct implications to the presenting problems and illnesses, and I have personally examined all pertinent and positive and negative findings, which impact on the prior neurological treatment. \par \par \par Sandra Garcia, MS, PA-C\par Jarrett Greenberg MD\par

## 2022-11-08 ENCOUNTER — APPOINTMENT (OUTPATIENT)
Dept: NEUROPSYCHOLOGY | Facility: CLINIC | Age: 77
End: 2022-11-08

## 2022-11-10 ENCOUNTER — OUTPATIENT (OUTPATIENT)
Dept: OUTPATIENT SERVICES | Facility: HOSPITAL | Age: 77
LOS: 1 days | Discharge: HOME | End: 2022-11-10

## 2022-11-10 ENCOUNTER — APPOINTMENT (OUTPATIENT)
Dept: NEUROPSYCHOLOGY | Facility: CLINIC | Age: 77
End: 2022-11-10

## 2022-11-10 DIAGNOSIS — G56.03 CARPAL TUNNEL SYNDROME, BILATERAL UPPER LIMBS: Chronic | ICD-10-CM

## 2022-11-10 DIAGNOSIS — Z90.49 ACQUIRED ABSENCE OF OTHER SPECIFIED PARTS OF DIGESTIVE TRACT: Chronic | ICD-10-CM

## 2022-11-10 DIAGNOSIS — G31.84 MILD COGNITIVE IMPAIRMENT OF UNCERTAIN OR UNKNOWN ETIOLOGY: ICD-10-CM

## 2022-11-10 DIAGNOSIS — Z98.890 OTHER SPECIFIED POSTPROCEDURAL STATES: Chronic | ICD-10-CM

## 2022-11-30 ENCOUNTER — APPOINTMENT (OUTPATIENT)
Dept: PAIN MANAGEMENT | Facility: CLINIC | Age: 77
End: 2022-11-30

## 2022-11-30 VITALS
HEIGHT: 62 IN | DIASTOLIC BLOOD PRESSURE: 81 MMHG | BODY MASS INDEX: 23.92 KG/M2 | WEIGHT: 130 LBS | HEART RATE: 87 BPM | SYSTOLIC BLOOD PRESSURE: 112 MMHG

## 2022-11-30 PROCEDURE — 99214 OFFICE O/P EST MOD 30 MIN: CPT

## 2022-12-02 NOTE — ASSESSMENT
[FreeTextEntry1] : 77 year old female with mild cognitive impairment, possible vascular dementia.   I will order PET CT to evaluate for Alzheimer's dementia and she will f/u once completed.  They are aware if there are any issues they can contact the office.  We will hold off on any medication for now.\par \par I personally reviewed with the PA, this patient's history and physical exam findings, as documented above. I have discussed the relevant areas of concern, having direct implications to the presenting problems and illnesses, and I have personally examined all pertinent and positive and negative findings, which impact on the prior neurological treatment. \par \par \par Sandra Garcia, MS, PA-C\par Jarrett Greenberg MD\par

## 2022-12-02 NOTE — HISTORY OF PRESENT ILLNESS
[FreeTextEntry1] : ORIGINAL PRESENTATION:  Patient is a 77 year old woman who was initially seen on 5/6/21 for symptoms of memory loss, forgetfulness, word finding difficulty, forgetting names and passwords. MRI of brain was done which was felt to be negative for her age. She was referred to neuropsychology for a more comprehensive evaluation. There was felt to be a language barrier issue. She requested a Bulgarian speaking neuropsychologist, so consultation was not done. Last time, she was advised to eat a plant-based veggie diet to control blood pressure, cholesterol. No smoke and No alcohol is advised and to stimulate herself with cross word puzzles and exercise regularly.  \par \par TODAY:  I had the pleasure of seeing Ms. Breen accompanied by her  today in follow up.  Her previous history and physical findings have been reviewed.\par \par She is under our care for possible dementia which is a chronic condition she is receiving continuing active treatment for.  She has completed neuropsychological testing in order to be properly assessed for dementia however, the results are not available to us.  According to patient's  they are suspecting a possible vascular dementia but are not 100% certain.  Her previous MRI only revealed mild ischemic changes that have been stable for quite some time.  I did also speak with the patient's son over the phone during the visit at the request of the patient who confirmed what his father said regarding the neuropscyhological visit.  He believes his mother is depressed and she was visibly upset during the visit when we discussed the possibilty of her having dementia.  She states that her niece and her aunt both had Alzheimer's but according to the patient's son the aunt she speaks of is not a blood relative and he does not recall a niece with this.   Both the patient and her  state there has been no progression with respect to her condition since her last visit.  We advised once we have the results we will revisit with her to discuss medication options if indicated and they are agreeable.

## 2022-12-19 ENCOUNTER — APPOINTMENT (OUTPATIENT)
Age: 77
End: 2022-12-19

## 2022-12-19 VITALS
HEIGHT: 62 IN | SYSTOLIC BLOOD PRESSURE: 120 MMHG | DIASTOLIC BLOOD PRESSURE: 80 MMHG | RESPIRATION RATE: 14 BRPM | OXYGEN SATURATION: 97 % | WEIGHT: 128 LBS | HEART RATE: 69 BPM | BODY MASS INDEX: 23.55 KG/M2

## 2022-12-19 PROCEDURE — 99214 OFFICE O/P EST MOD 30 MIN: CPT

## 2022-12-19 NOTE — HISTORY OF PRESENT ILLNESS
[Follow-Up - Routine Clinic] : a routine clinic follow-up of [Cough] : cough [Productive Cough] : no productive cough [Non-Productive Cough] : non-productive cough [Nasal Congestion] : nasal congestion [Rhinorrhea] : rhinorrhea [Postnasal Drainage] : postnasal drainage [Currently Experiencing] : The patient is currently experiencing symptoms.

## 2022-12-21 ENCOUNTER — APPOINTMENT (OUTPATIENT)
Dept: NEUROLOGY | Facility: CLINIC | Age: 77
End: 2022-12-21

## 2022-12-21 PROCEDURE — 99214 OFFICE O/P EST MOD 30 MIN: CPT

## 2022-12-21 NOTE — ASSESSMENT
[FreeTextEntry1] : Impression is that of cerebral atrophy. Family was informed that patient's brain is shrinking which is responsible for her memory decline. I reiterated reducing risk factors which includes switching to a plant-based, vegetarian diet, controlling her blood pressure, blood sugar, and cholesterol, and exercising at least 30 minutes a day. Patient should not smoke or consume alcohol. She does not need to see me back in follow up at this time unless there should be any new or worsening of her symptoms. \par \par Entered by Kalyn Lara acting as scribe for Dr. Greenberg.\par \par \par The documentation recorded by the scribe, in my presence, accurately reflects the service I personally performed, and the decisions made by me with my edits as appropriate. \par Jarrett Greenberg MD, FAAN, FACP\par Diplomate American Board of Psychiatry & Neurology\par \par

## 2022-12-21 NOTE — HISTORY OF PRESENT ILLNESS
[FreeTextEntry1] : Ms. Breen is a 77 year old woman who presents today in neurologic follow up. She was initially seen on 5/6/21 for symptoms of memory loss, forgetfulness, word finding difficulty, forgetting names and passwords. MRI of brain was done which was felt to be negative for her age. She was referred to neuropsychology for a more comprehensive evaluation. There was felt to be a language barrier issue. She requested a Stateless speaking neuropsychologist, so consultation was not done. Last time, she was advised to eat a plant-based veggie diet to control blood pressure, cholesterol. No smoke and No alcohol is advised and to stimulate herself with cross word puzzles and exercise regularly.  \par \par Patient has completed neuropsychological testing in order to be properly assessed for dementia however, the results are not available to us.  According to patient's  they are suspecting a possible vascular dementia but are not 100% certain.  Her previous MRI only revealed mild ischemic changes that have been stable for quite some time.  I did also speak with the patient's son over the phone during the visit at the request of the patient who confirmed what his father said regarding the neuropsychological visit.  He believes his mother is depressed and she was visibly upset during the visit when we discussed the possibility of her having dementia.  She states that her niece and her aunt both had Alzheimer's but according to the patient's son the aunt she speaks of is not a blood relative and he does not recall a niece with this.   Both the patient and her  state there has been no progression with respect to her condition since her last visit.\par \par Patient did have PET CT scan of the brain which showed significant cortical volume loss disproportionate for her typical age. This is not typical of Alzheimer's disease or frontotemporal dementia. \par

## 2022-12-23 NOTE — ED ADULT NURSE NOTE - PERIPHERAL VASCULAR
Patient was informed of the advised and has no further questions at this time. Patient will call back with new readings later today. States she took meds at 10 this morning and was just put on amlodipine and few days ago.   WDL

## 2022-12-30 ENCOUNTER — EMERGENCY (EMERGENCY)
Facility: HOSPITAL | Age: 77
LOS: 0 days | Discharge: HOME | End: 2022-12-30
Attending: EMERGENCY MEDICINE | Admitting: EMERGENCY MEDICINE
Payer: MEDICARE

## 2022-12-30 VITALS
RESPIRATION RATE: 18 BRPM | OXYGEN SATURATION: 96 % | DIASTOLIC BLOOD PRESSURE: 88 MMHG | SYSTOLIC BLOOD PRESSURE: 134 MMHG | HEART RATE: 74 BPM | TEMPERATURE: 97 F | WEIGHT: 134.92 LBS

## 2022-12-30 DIAGNOSIS — F41.9 ANXIETY DISORDER, UNSPECIFIED: ICD-10-CM

## 2022-12-30 DIAGNOSIS — R07.0 PAIN IN THROAT: ICD-10-CM

## 2022-12-30 DIAGNOSIS — Z88.8 ALLERGY STATUS TO OTHER DRUGS, MEDICAMENTS AND BIOLOGICAL SUBSTANCES STATUS: ICD-10-CM
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
## 2022-12-30 DIAGNOSIS — S10.0XXA CONTUSION OF THROAT, INITIAL ENCOUNTER: ICD-10-CM

## 2022-12-30 DIAGNOSIS — Z90.49 ACQUIRED ABSENCE OF OTHER SPECIFIED PARTS OF DIGESTIVE TRACT: Chronic | ICD-10-CM

## 2022-12-30 DIAGNOSIS — G56.03 CARPAL TUNNEL SYNDROME, BILATERAL UPPER LIMBS: Chronic | ICD-10-CM

## 2022-12-30 DIAGNOSIS — H91.93 UNSPECIFIED HEARING LOSS, BILATERAL: ICD-10-CM

## 2022-12-30 DIAGNOSIS — Z98.890 OTHER SPECIFIED POSTPROCEDURAL STATES: Chronic | ICD-10-CM

## 2022-12-30 DIAGNOSIS — Z97.4 PRESENCE OF EXTERNAL HEARING-AID: ICD-10-CM
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
## 2022-12-30 DIAGNOSIS — I10 ESSENTIAL (PRIMARY) HYPERTENSION: ICD-10-CM

## 2022-12-30 DIAGNOSIS — Z87.19 PERSONAL HISTORY OF OTHER DISEASES OF THE DIGESTIVE SYSTEM: ICD-10-CM
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
## 2022-12-30 DIAGNOSIS — G43.909 MIGRAINE, UNSPECIFIED, NOT INTRACTABLE, WITHOUT STATUS MIGRAINOSUS: ICD-10-CM

## 2022-12-30 DIAGNOSIS — Z90.49 ACQUIRED ABSENCE OF OTHER SPECIFIED PARTS OF DIGESTIVE TRACT: ICD-10-CM

## 2022-12-30 PROCEDURE — 99282 EMERGENCY DEPT VISIT SF MDM: CPT

## 2022-12-30 NOTE — ED PROVIDER NOTE - CLINICAL SUMMARY MEDICAL DECISION MAKING FREE TEXT BOX
Pt with ecchymosis at L OP, no swelling, no pta.  possibly 2/2 traumatic brushing of teeth.  pt has seen dr. knutson in the past for sinus surgery 2 yrs ago, will dc to f/u with dr. shea.  ref coordinator made aware

## 2022-12-30 NOTE — ED PROVIDER NOTE - OBJECTIVE STATEMENT
78 yo f with pmh of anxiety, htn, no ac or asa use, presents with "funny feeling in back of throat.".  pt noticed discoloration at the back left throat.  pt admits may have brushed teeth too vigorously.  no dental pain.  no difficulty swallowing or breathing

## 2022-12-30 NOTE — ED PROVIDER NOTE - CARE PROVIDER_API CALL
Niko Fernando)  Otolaryngology  [address] [place] [address], [address] [place] [address]  Phone: [phone]  Fax: [phone]  Follow Up Time: 1-3 Days

## 2022-12-30 NOTE — ED PROVIDER NOTE - PATIENT PORTAL LINK FT
You can access the FollowMyHealth Patient Portal offered by [facility] by registering at the following website: [email or web address]. By joining [facility]’s FollowMyHealth portal, you will also be able to view your health information using other applications (apps) compatible with our system.

## 2022-12-30 NOTE — ED ADULT NURSE NOTE - OBJECTIVE STATEMENT
Patient, with stomach pain 2 months ago which she received an endoscopy and esophageal ultrasound for, presents with sore/abscess to the back of throat; patient has upper dentures. Patient is not currently complaining of pain.

## 2023-01-05 ENCOUNTER — APPOINTMENT (OUTPATIENT)
Dept: OTOLARYNGOLOGY | Facility: CLINIC | Age: 78
End: 2023-01-05
Payer: MEDICARE

## 2023-01-05 DIAGNOSIS — R04.0 EPISTAXIS: ICD-10-CM

## 2023-01-05 DIAGNOSIS — R09.81 NASAL CONGESTION: ICD-10-CM

## 2023-01-05 DIAGNOSIS — R04.2 HEMOPTYSIS: ICD-10-CM

## 2023-01-05 PROCEDURE — 99213 OFFICE O/P EST LOW 20 MIN: CPT | Mod: 25

## 2023-01-05 PROCEDURE — 31231 NASAL ENDOSCOPY DX: CPT

## 2023-01-05 NOTE — HISTORY OF PRESENT ILLNESS
[FreeTextEntry1] : Patient returns today c/o throat discomfort.  She woke up last week with a clot in left side of  throat and bck of the nose . She was seen in ED, occurred only once .  She is having  discomfort  , denies  any pain .  She has no difficulty with swallowing \par

## 2023-01-13 ENCOUNTER — APPOINTMENT (OUTPATIENT)
Dept: GASTROENTEROLOGY | Facility: CLINIC | Age: 78
End: 2023-01-13
Payer: MEDICARE

## 2023-01-13 VITALS
SYSTOLIC BLOOD PRESSURE: 116 MMHG | HEART RATE: 71 BPM | OXYGEN SATURATION: 95 % | WEIGHT: 128 LBS | BODY MASS INDEX: 23.55 KG/M2 | DIASTOLIC BLOOD PRESSURE: 83 MMHG | HEIGHT: 62 IN

## 2023-01-13 PROCEDURE — 99213 OFFICE O/P EST LOW 20 MIN: CPT

## 2023-01-13 RX ORDER — FLUTICASONE PROPIONATE 50 UG/1
50 SPRAY, METERED NASAL DAILY
Qty: 1 | Refills: 3 | Status: DISCONTINUED | COMMUNITY
Start: 2022-12-27 | End: 2023-01-13

## 2023-01-13 NOTE — HISTORY OF PRESENT ILLNESS
[FreeTextEntry1] : 77 yr old female with GERD here for 4 month F/U. She stopped taking the Sucralfate because of the taste but she has been taking the Pantoprazole bid with no further heartburn. She lost 3 lbs in the past 4 months but she stated that she wasn't eating much because she was worried about different health issues.She denied dysphagia, vomiting, hematemesis, constipation, diarrhea, melena,fevers,  or blood in her stools. She recently woke up with a blood clot in the back of the throat; she went to the ED and she was referred to ENT; she saw ENT 1/5/22 and no pathology was found. She also saw Pulmonary for a chronic cough and she was diagnosed with Upper Airway Cough Syndrome; she was prescribed nasal spray and Singulair with improvement of symptoms.  [de-identified] : 6/9/2022

## 2023-01-13 NOTE — ASSESSMENT
[FreeTextEntry1] : 77 yr old female with GERD here for 4 month F/U. She stopped taking the Sucralfate because of the taste but she has been taking the Pantoprazole bid with no further heartburn.\par \par Acid Reflux\par \par - Well controlled on PPI, continue Pantoprazole OD\par - GERD diet and GERD measures recommended\par - F/U in 6 months\par - Need to get records of last colonoscopy done within the past 2 years (per patient)

## 2023-01-13 NOTE — REVIEW OF SYSTEMS
[Negative] : Heme/Lymph [As Noted in HPI] : as noted in HPI [Abdominal Pain] : no abdominal pain [Vomiting] : no vomiting [Constipation] : no constipation [Diarrhea] : no diarrhea [Heartburn] : no heartburn [Melena (black stool)] : no melena [Bleeding] : no bleeding [Fecal Incontinence (soiling)] : no fecal incontinence [Bloating (gassiness)] : no bloating [Confused] : no confusion [Convulsions] : no convulsions [Dizziness] : no dizziness [Fainting] : no fainting [Limb Weakness] : no limb weakness [Difficulty Walking] : no difficulty walking [de-identified] : numbness around mouth, chronic headaches (told her to see her PCP)

## 2023-01-13 NOTE — PHYSICAL EXAM
[None] : no edema [Normal] : normal bowel sounds, non-tender, no masses, soft, no no hepato-splenomegaly [Abnormal Walk] : normal gait [Normal Color / Pigmentation] : normal skin color and pigmentation [Oriented To Time, Place, And Person] : oriented to person, place, and time

## 2023-01-25 ENCOUNTER — APPOINTMENT (OUTPATIENT)
Dept: OTOLARYNGOLOGY | Facility: CLINIC | Age: 78
End: 2023-01-25
Payer: MEDICARE

## 2023-01-25 VITALS — BODY MASS INDEX: 23.55 KG/M2 | HEIGHT: 62 IN | WEIGHT: 128 LBS

## 2023-01-25 DIAGNOSIS — G43.909 MIGRAINE, UNSPECIFIED, NOT INTRACTABLE, W/OUT STATUS MIGRAINOSUS: ICD-10-CM

## 2023-01-25 DIAGNOSIS — R51.9 HEADACHE, UNSPECIFIED: ICD-10-CM

## 2023-01-25 PROCEDURE — 31231 NASAL ENDOSCOPY DX: CPT

## 2023-01-25 PROCEDURE — 99213 OFFICE O/P EST LOW 20 MIN: CPT | Mod: 25

## 2023-01-25 NOTE — REASON FOR VISIT
[Subsequent Evaluation] : a subsequent evaluation for [FreeTextEntry2] : dry mouth , nasal swelling radiates to both eyes

## 2023-01-25 NOTE — HISTORY OF PRESENT ILLNESS
[FreeTextEntry1] : Patient present today c/o dry mouth , nasal swelling radiates to both eyes  . Patient states she had a popping sensation in her nostrils and woke up to pain and swelling went into the eyes.    She also c/o dry mouth in the morning.Pt has nasal congestion at night but better during the day.

## 2023-01-25 NOTE — ASU PREOP CHECKLIST - INTERNAL PROSTHESES
Subjective:       Patient ID: Junior Rodriguez no

## 2023-01-25 NOTE — PROCEDURE
[Rigid Endoscope] : examined with a rigid endoscope [Normal] : the paranasal sinuses had no abnormalities [de-identified] : nasal congestion

## 2023-02-07 ENCOUNTER — APPOINTMENT (OUTPATIENT)
Dept: OTOLARYNGOLOGY | Facility: CLINIC | Age: 78
End: 2023-02-07
Payer: MEDICARE

## 2023-02-07 VITALS — WEIGHT: 128 LBS | HEIGHT: 62 IN | BODY MASS INDEX: 23.55 KG/M2

## 2023-02-07 DIAGNOSIS — J04.0 ACUTE LARYNGITIS: ICD-10-CM

## 2023-02-07 DIAGNOSIS — Z87.898 PERSONAL HISTORY OF OTHER SPECIFIED CONDITIONS: ICD-10-CM

## 2023-02-07 PROCEDURE — 99213 OFFICE O/P EST LOW 20 MIN: CPT | Mod: 25

## 2023-02-07 PROCEDURE — 31575 DIAGNOSTIC LARYNGOSCOPY: CPT

## 2023-02-07 NOTE — HISTORY OF PRESENT ILLNESS
[FreeTextEntry1] : Patient presents today c/o mouth closed shut.  Patient states this morning she was not able to open her mouth at all.  She has been suffering with dry mouth but this morning her mouth was so dry she could not open her mouth at all.   She is now able to  talk and open it .

## 2023-02-07 NOTE — PROCEDURE
[None] : none [Flexible Endoscope] : examined with the flexible endoscope [de-identified] : laryngitis

## 2023-02-07 NOTE — PHYSICAL EXAM
[Normal] : mucosa is normal [Midline] : trachea located in midline position [de-identified] : white and dry

## 2023-02-15 ENCOUNTER — EMERGENCY (EMERGENCY)
Facility: HOSPITAL | Age: 78
LOS: 0 days | Discharge: HOME | End: 2023-02-15
Attending: EMERGENCY MEDICINE
Payer: MEDICARE

## 2023-02-15 VITALS
DIASTOLIC BLOOD PRESSURE: 90 MMHG | TEMPERATURE: 98 F | RESPIRATION RATE: 16 BRPM | SYSTOLIC BLOOD PRESSURE: 170 MMHG | HEART RATE: 80 BPM | OXYGEN SATURATION: 96 %

## 2023-02-15 DIAGNOSIS — G43.909 MIGRAINE, UNSPECIFIED, NOT INTRACTABLE, WITHOUT STATUS MIGRAINOSUS: ICD-10-CM

## 2023-02-15 DIAGNOSIS — Z98.890 OTHER SPECIFIED POSTPROCEDURAL STATES: Chronic | ICD-10-CM

## 2023-02-15 DIAGNOSIS — I10 ESSENTIAL (PRIMARY) HYPERTENSION: ICD-10-CM

## 2023-02-15 DIAGNOSIS — K21.9 GASTRO-ESOPHAGEAL REFLUX DISEASE WITHOUT ESOPHAGITIS: ICD-10-CM

## 2023-02-15 DIAGNOSIS — G56.03 CARPAL TUNNEL SYNDROME, BILATERAL UPPER LIMBS: Chronic | ICD-10-CM

## 2023-02-15 DIAGNOSIS — R05.1 ACUTE COUGH: ICD-10-CM

## 2023-02-15 DIAGNOSIS — F41.9 ANXIETY DISORDER, UNSPECIFIED: ICD-10-CM

## 2023-02-15 DIAGNOSIS — Z90.49 ACQUIRED ABSENCE OF OTHER SPECIFIED PARTS OF DIGESTIVE TRACT: Chronic | ICD-10-CM

## 2023-02-15 DIAGNOSIS — J02.9 ACUTE PHARYNGITIS, UNSPECIFIED: ICD-10-CM

## 2023-02-15 DIAGNOSIS — Z88.8 ALLERGY STATUS TO OTHER DRUGS, MEDICAMENTS AND BIOLOGICAL SUBSTANCES: ICD-10-CM

## 2023-02-15 DIAGNOSIS — R07.0 PAIN IN THROAT: ICD-10-CM

## 2023-02-15 DIAGNOSIS — Z90.49 ACQUIRED ABSENCE OF OTHER SPECIFIED PARTS OF DIGESTIVE TRACT: ICD-10-CM

## 2023-02-15 PROCEDURE — 99284 EMERGENCY DEPT VISIT MOD MDM: CPT

## 2023-02-15 PROCEDURE — 99282 EMERGENCY DEPT VISIT SF MDM: CPT

## 2023-02-15 NOTE — ED PROVIDER NOTE - PHYSICAL EXAMINATION
unknown
Gen: NAD, AOx3  Head: NCAT  HEENT: PERRL, oral mucosa moist, normal conjunctiva, oropharynx clear without exudate or erythema  Lung: CTAB, no respiratory distress, no wheezing, rales, rhonchi  CV: normal s1/s2, rrr, Normal perfusion, pulses 2+ throughout  Abd: soft, NTND, no CVA tenderness  Genitourinary: no pelvic tenderness  MSK: No edema, no visible deformities, full range of motion in all 4 extremities  Neuro: No focal neurologic deficits  Skin: No rash   Psych: normal affect

## 2023-02-15 NOTE — ED PROVIDER NOTE - CARE PROVIDER_API CALL
Niko Fernando)  Otolaryngology  [address] [place] [address], [address] [place] [address]  Phone: [phone]  Fax: [phone]  Follow Up Time: 1-3 Days

## 2023-02-15 NOTE — ED PROVIDER NOTE - ATTENDING APP SHARED VISIT CONTRIBUTION OF CARE
1 day history of thickness and dryness of the throat.  Now this is resolved.  States that she was, spitting up some phlegm.  Otherwise voices intact no nausea or vomiting no change in p.o. no fevers.  On exam the oropharynx is normal mild dryness to the tongue.  The uvula is midline.  There is no swelling or discharge.  Plan will be supportive care and follow-up with ENT

## 2023-02-15 NOTE — ED PROVIDER NOTE - CLINICAL SUMMARY MEDICAL DECISION MAKING FREE TEXT BOX
77-year-old female with PMH of anxiety, HTN who presents with sore throat.  Patient has had irritated uvula chronically.  Sees ENT Kassie.  Diagnosed with dry mouth.  Conservative management.  No changes from previous presentations.  Patient denies any difficulty swallowing, nausea, vomiting, neck swelling, fevers, chills.  Physical exam showing elongated midline uvula, nonerythematous, nontender, no edema.  Oropharynx clear, no erythema, no swelling.  No signs of airway compromise.  No neck swelling or TTP.  Full range of motion.  Patient with elongated uvula, chronic.  Patient not in any acute distress, no pain.  Patient does not any further work-up or medication at this time.  Patient asymptomatic.  Patient has follow-up with ENT Dr. Fernando

## 2023-02-15 NOTE — ED PROVIDER NOTE - OBJECTIVE STATEMENT
78 yo female with a pmh of htn, gerd, and anxiety presents c/o sore throat for over a year. pt states last night she experienced a cough and purulent sputum. pt states to have followed with Dr. Fernando last week for her throat pain. pt denies any other symptoms including fevers, chill, headache, recent illness/travel, abdominal pain, chest pain, or SOB.

## 2023-02-15 NOTE — ED PROVIDER NOTE - PATIENT PORTAL LINK FT
You can access the FollowMyHealth Patient Portal offered by [facility] by registering at the following website: [email or web address]. By joining [facility]’s FollowMyHealth portal, you will also be able to view your health information using other applications (apps) compatible with our system.

## 2023-03-31 ENCOUNTER — EMERGENCY (EMERGENCY)
Facility: HOSPITAL | Age: 78
LOS: 0 days | Discharge: HOME | End: 2023-03-31
Attending: EMERGENCY MEDICINE
Payer: MEDICARE

## 2023-03-31 VITALS
HEART RATE: 71 BPM | SYSTOLIC BLOOD PRESSURE: 154 MMHG | WEIGHT: 130.07 LBS | OXYGEN SATURATION: 96 % | RESPIRATION RATE: 18 BRPM | TEMPERATURE: 98 F | DIASTOLIC BLOOD PRESSURE: 84 MMHG

## 2023-03-31 DIAGNOSIS — Z98.890 OTHER SPECIFIED POSTPROCEDURAL STATES: Chronic | ICD-10-CM

## 2023-03-31 DIAGNOSIS — I10 ESSENTIAL (PRIMARY) HYPERTENSION: ICD-10-CM

## 2023-03-31 DIAGNOSIS — Z88.8 ALLERGY STATUS TO OTHER DRUGS, MEDICAMENTS AND BIOLOGICAL SUBSTANCES STATUS: ICD-10-CM

## 2023-03-31 DIAGNOSIS — Z96.653 PRESENCE OF ARTIFICIAL KNEE JOINT, BILATERAL: ICD-10-CM

## 2023-03-31 DIAGNOSIS — G56.03 CARPAL TUNNEL SYNDROME, BILATERAL UPPER LIMBS: Chronic | ICD-10-CM

## 2023-03-31 DIAGNOSIS — K21.9 GASTRO-ESOPHAGEAL REFLUX DISEASE WITHOUT ESOPHAGITIS: ICD-10-CM

## 2023-03-31 DIAGNOSIS — Z98.890 OTHER SPECIFIED POSTPROCEDURAL STATES: ICD-10-CM

## 2023-03-31 DIAGNOSIS — Z90.49 ACQUIRED ABSENCE OF OTHER SPECIFIED PARTS OF DIGESTIVE TRACT: Chronic | ICD-10-CM

## 2023-03-31 DIAGNOSIS — Z87.39 PERSONAL HISTORY OF OTHER DISEASES OF THE MUSCULOSKELETAL SYSTEM AND CONNECTIVE TISSUE: ICD-10-CM

## 2023-03-31 DIAGNOSIS — F41.9 ANXIETY DISORDER, UNSPECIFIED: ICD-10-CM

## 2023-03-31 DIAGNOSIS — G43.909 MIGRAINE, UNSPECIFIED, NOT INTRACTABLE, WITHOUT STATUS MIGRAINOSUS: ICD-10-CM

## 2023-03-31 DIAGNOSIS — Z90.49 ACQUIRED ABSENCE OF OTHER SPECIFIED PARTS OF DIGESTIVE TRACT: ICD-10-CM

## 2023-03-31 PROCEDURE — 99283 EMERGENCY DEPT VISIT LOW MDM: CPT

## 2023-03-31 PROCEDURE — 99284 EMERGENCY DEPT VISIT MOD MDM: CPT

## 2023-03-31 RX ORDER — FAMOTIDINE 10 MG/ML
1 INJECTION INTRAVENOUS
Qty: 28 | Refills: 0
Start: 2023-03-31 | End: 2023-04-13

## 2023-03-31 RX ORDER — FAMOTIDINE 10 MG/ML
20 INJECTION INTRAVENOUS ONCE
Refills: 0 | Status: COMPLETED | OUTPATIENT
Start: 2023-03-31 | End: 2023-03-31

## 2023-03-31 RX ADMIN — FAMOTIDINE 20 MILLIGRAM(S): 10 INJECTION INTRAVENOUS at 10:37

## 2023-03-31 NOTE — ED PROVIDER NOTE - OBJECTIVE STATEMENT
78 yo female, no PMHx, presents with reflux like symptoms with green-mega sputum x1 year, no alleviating or aggravating factors, no associated symptoms. She has seen GI and had an esophagram and endoscopy. Denies fevers, chills, cough, nausea, vomiting, chest pain, shortness of breath, throat pain, bloody sputum.

## 2023-03-31 NOTE — ED PROVIDER NOTE - NSFOLLOWUPINSTRUCTIONS_ED_ALL_ED_FT
These home care steps can help you manage GERD:    Stay at a healthy weight. Get help to lose any extra pounds.    Don't lie down after meals.    Don't eat late at night.    Raise the head of your bed by 4 to 6 inches. You can do this by placing wooden blocks or bed risers under the head of your bed. Or you can put a wedge under the mattress.    Don't wear tight-fitting clothes.    Don't eat or drink things that might bother your stomach. This includes foods or drinks with:    Alcohol    Fat    Chocolate    Caffeine    Spearmint or peppermint    Citrus or other acidic juice  Peppers, garlic, onions, or similar spices  Talk with your healthcare provider if you take medicines. Some medicines can make GERD symptoms worse. They include:    Calcium channel blockers    Theophylline    Anticholinergic medicines, such as oxybutynin and benztropine    Start an exercise program. Ask your healthcare provider how to get started. Simple activities, such as walking or gardening, can help.    If you smoke, take steps to stop. Join a stop-smoking program to improve your chances of success.    Limit your alcohol intake.    If you are prescribed medicine for GERD, take it as directed. Don’t skip doses.    Don't take over-the-counter nonsteroidal anti-inflammatory drugs (NSAIDs), unless your healthcare provider says it's OK. This includes aspirin and ibuprofen.     Talk with your healthcare provider about treatment if you are pregnant. GERD can start or get worse in pregnancy.  Follow-up care  Make a check-up visit as directed by our staff.    Call 911  Call 911 if you have any of these:    Pain in the neck, chest, or back  Choking, coughing, trouble breathing, or wheezing  When to call your healthcare provider  Call your healthcare provider right away if you have any of these:    Trouble swallowing    Pain when swallowing    Feeling of food caught in your chest or throat    Heartburn that causes you to vomit    Vomiting blood or what looks like coffee grounds    Black or tarry stools    More saliva than usual    Weight loss when you aren't trying to lose weight    Hoarseness or sore throat that won’t go away

## 2023-03-31 NOTE — ED PROVIDER NOTE - CLINICAL SUMMARY MEDICAL DECISION MAKING FREE TEXT BOX
Agree with above exam and history.  Patient here with 1 year history of coughing up greenish sputum after eating.  Advised GI follow-up will prescribe Pepcid no further ED work-up needed.

## 2023-03-31 NOTE — ED PROVIDER NOTE - IV ALTEPLASE ADMIN OUTSIDE HIDDEN
eMERGENCY dEPARTMENT eNCOUnter      CHIEF COMPLAINT    Chief Complaint   Patient presents with   • Abdominal Pain       HPI    Alexandra Castañeda is a 23 year old female who presents to emergency department for evaluation of right-sided abdominal discomfort. Patient states she was at work this evening. About 645 she developed discomfort most in her right upper quadrant abdominal area. She thought she had to have a bowel movement. She try to go but was unable to. Subsequent to this she states she's had a pain that wraps around to her back. It's very uncomfortable. No shortness of breath. No cough with this. No urinary symptoms. No blood in her urine. She's been a little bit nauseated but denies any vomiting associated with this. With the discomfort that came on she presents to the emergency department for evaluation    ALLERGIES    ALLERGIES:  No Known Allergies    CURRENT MEDICATIONS    Current Facility-Administered Medications   Medication Dose Route Frequency Provider Last Rate Last Dose   • sodium chloride (PF) 0.9 % injection 2 mL  2 mL Injection Once Jam Saavedra DO       • sodium chloride (PF) 0.9 % injection 2 mL  2 mL Injection PRN Jam Saavedra DO         Current Outpatient Prescriptions   Medication Sig Dispense Refill   • etonogestrel (NEXPLANON) 68 MG implant Inject 68 mg into the skin once.         PAST MEDICAL HISTORY    Past Medical History   Diagnosis Date   • ADHD (attention deficit hyperactivity disorder)    • Anxiety      pt states   • Low grade squamous intraepithelial lesion (LGSIL) on cervical Pap smear 07/10/2015       SURGICAL HISTORY    Past Surgical History   Procedure Laterality Date   • No past surgeries     • Etonogestrel implant  implant + supplies  06/30/2014   • Bunionectomy  4/29/15   • Foot fracture surgery  5/20016       SOCIAL HISTORY    Social History     Social History   • Marital status: Single     Spouse name: N/A   • Number of children: N/A   • Years of  education: N/A     Social History Main Topics   • Smoking status: Current Some Day Smoker     Packs/day: 0.25     Years: 5.00     Types: Cigarettes   • Smokeless tobacco: Never Used      Comment: pt declines smoking cessation materials at this time    • Alcohol use 0.0 oz/week     0 Standard drinks or equivalent per week      Comment: casual   • Drug use: No   • Sexual activity: Not Currently     Birth control/ protection: Condom, Inserts     Other Topics Concern   • None     Social History Narrative       FAMILY HISTORY    Family History   Problem Relation Age of Onset   • Asthma Mother    • Diabetes Maternal Grandmother    • Hypertension Maternal Grandmother    • Ovarian cancer Neg Hx    • Colon cancer Neg Hx    • Breast cancer Neg Hx        REVIEW OF SYSTEMS    Constitutional:  Denies fever or chills. Denies generalized weakness  Eyes:  Denies change in visual acuity. Denies drainage. Denies eye redness. Denies eye pain  HENT:  Denies nasal congestion or sore throat. Denies earache. Denies postnasal drip. Denies difficulty swallowing  Respiratory:  Denies cough or shortness of breath. Denies hemoptysis  Cardiovascular:  Denies chest pain or edema. Denies palpitations. Denies syncope Denies orthopnea.  GI:  Positive right-sided abdominal pain, positive nausea, denies vomiting, bloody stools or diarrhea. Denies hematemesis  :  Denies dysuria. Denies urinary frequency. Denies hematuria.   Musculoskeletal: Positive right-sided back pain. Denies joint pain.   Integument:  Denies rash. Denies bruising. Denies laceration or abrasion  Neurologic:  Denies headache, focal weakness. Denies numbness or tingling in any extremity. Denies weakness in any extremity. Denies loss of bladder or bowel control  Endocrine:  Denies polyuria or polydipsia.   Lymphatic:  Denies swollen glands.   Psychiatric:  Denies depression or anxiety.     PHYSICAL EXAM    ED Triage Vitals   BP 01/30/17 2031 145/62   Pulse 01/30/17 2031 81   Resp  01/30/17 2031 18   Temp 01/30/17 2031 97.9 °F (36.6 °C)   SpO2 01/30/17 2031 100 %     Pulse Ox Interpretation:  Within normal limits  Constitutional:  Well developed, well nourished. No acute distress, non-toxic appearance. Patient is able to speak in full sentences  Eyes:  PERRL, EOMI. Conjunctivae normal. No subconjunctival hemorrhage. No papilledema. No drainage  HENT:  Atraumatic. External ears normal. Nose normal. Oropharynx moist. Pharynx is nonerythematous. Uvula is midline. Tympanic membranes are pearly gray bilaterally. No TM perforation. No evidence of peritonsillar abscess  Neck: Normal range of motion. No point c-spine tenderness. Supple. No JVD. No carotid bruits. Negative Kernig and Brudzinski signs  Respiratory:  No respiratory distress, normal breath sounds. No rales. No wheezing.   Cardiovascular:  Normal rate, normal rhythm. No murmurs, gallops, or rubs. No thrills  GI:  Soft, right upper quadrant tenderness to palpation. Normal bowel sounds, nontender. No hepatomegaly, splenomegaly, mass, rebound or guarding. Negative McBurney's point tenderness to palpation. Negative psoas sign. Negative heel jar. No fluid wave on palpation. No pulsatile mass on exam  :  No costovertebral angle tenderness.   Musculoskeletal:  No edema, tenderness, or deformities to any of the 4 extremities. Back - no thoracic or lumbar point tenderness.   Integument:  Well hydrated, no rash. No petechia or purpura. No abrasions or lacerations  Lymphatic:  No anterior or posterior cervical lymphadenopathy noted.   Neurologic:  Alert & oriented x 3.  Normal Mental status.  Normal Cranial Nervies.  EOMI.  RANJIT.  Normal 5/5 muscular strength in both upper and lower extremities.  Normal sensation.  Normal and symmetric reflexes. Normal cerbellar function.  Normal Gait. Negative Babinski. Straight leg raise is negative bilaterally. Negative saddle anesthesia. GCS 15  Psychiatric:  Speech and behavior appropriate.       RADIOLOGY     US Liver / Gallbladder / Pancreas   Final Result   Impression:       Mild right hydronephrosis.      No cholelithiasis or sonographic findings suggestive of cholecystitis.         CT ABDOMEN PELVIS FOR KIDNEY STONES   Final Result   IMPRESSION:      Mild right hydronephrosis which is a sequela of a 5 mm calculus at the   ureteropelvic junction (image 2:62). Nonobstructing small right   nephrolithiasis measuring up to 2 mm.              LABS    Results for orders placed or performed during the hospital encounter of 01/30/17   CBC & Auto Differential   Result Value Ref Range    WBC 9.2 4.2 - 11.0 K/mcL    RBC 4.90 4.00 - 5.20 mil/mcL    HGB 15.5 12.0 - 15.5 g/dL    HCT 44.8 36.0 - 46.5 %    MCV 91.4 78.0 - 100.0 fl    MCH 31.6 26.0 - 34.0 pg    MCHC 34.6 32.0 - 36.5 g/dL    RDW-CV 11.7 11.0 - 15.0 %     140 - 450 K/mcL    DIFF TYPE AUTOMATED DIFFERENTIAL     Neutrophil 66 %    LYMPH 27 %    MONO 6 %    EOSIN 1 %    BASO 0 %    Absolute Neutrophil 6.0 1.8 - 7.7 K/mcL    Absolute Lymph 2.5 1.0 - 4.8 K/mcL    Absolute Mono 0.6 0.3 - 0.9 K/mcL    Absolute Eos 0.1 0.1 - 0.5 K/mcL    Absolute Baso 0.0 0.0 - 0.3 K/mcL   Comprehensive Metabolic Panel   Result Value Ref Range    Sodium 139 135 - 145 mmol/L    Potassium 3.6 3.4 - 5.1 mmol/L    Chloride 103 98 - 107 mmol/L    Carbon Dioxide 24 21 - 32 mmol/L    Anion Gap 16 10 - 20 mmol/L    Glucose 113 (H) 65 - 99 mg/dL    BUN 12 10 - 20 mg/dL    Creatinine 0.74 0.51 - 0.95 mg/dL    GFR Estimate,  >90     GFR Estimate, Non African American >90     BUN/Creatinine Ratio 16 7 - 25    CALCIUM 9.5 8.4 - 10.2 mg/dL    TOTAL BILIRUBIN 0.7 0.2 - 1.0 mg/dL    AST/SGOT 28 <38 Units/L    ALT/SGPT 46 <79 Units/L    ALK PHOSPHATASE 88 45 - 117 Units/L    TOTAL PROTEIN 8.0 6.4 - 8.2 g/dL    Albumin 4.3 3.6 - 5.1 g/dL    GLOBULIN 3.7 2.0 - 4.0 g/dL    A/G Ratio, Serum 1.2 1.0 - 2.4   Lipase Level   Result Value Ref Range    Lipase 168 73 - 393 Units/L   Urinalysis &  Reflex Micro with Culture if Indicated   Result Value Ref Range    COLOR YELLOW YELLOW    APPEARANCE CLEAR     GLUCOSE(URINE) NEGATIVE NEGATIVE mg/dL    BILIRUBIN NEGATIVE NEGATIVE    KETONES 15 (A) NEGATIVE mg/dL    SPECIFIC GRAVITY 1.015 1.005 - 1.030    BLOOD SMALL (A) NEGATIVE    pH 7.5 (H) 5.0 - 7.0 Units    PROTEIN(URINE) TRACE (A) NEGATIVE mg/dL    UROBILINOGEN 0.2 0.0 - 1.0 mg/dL    NITRITE NEGATIVE NEGATIVE    LEUKOCYTE ESTERASE SMALL (A) NEGATIVE    SPECIMEN TYPE URINE, CLEAN CATCH/MIDSTREAM    Urinalysis Microscopic   Result Value Ref Range    Squamous EPI'S >100 (H) 0 - 5 /hpf    RBC 4 to 5 0 - 3 /hpf    WBC >100 (H) 0 - 5 /hpf    BACTERIA LARGE (A) NONE SEEN /hpf    Hyaline Casts NONE SEEN 0 - 5 /lpf   Urine Pregnancy Test (UCG) - Point of Care   Result Value Ref Range    URINE PREGNANCY,QUAL Negative.          ED COURSE & MEDICAL DECISION MAKING    8:45 PM- due to this patient's presentation upon arrival she will have an IV established. Laboratory data will be drawn and sent at this point including LFTs and a lipase. Urinalysis will be checked look for any sign of blood that could be associated with kidney stone. We also look for UTI or pyelonephritis. This could be biliary in nature. We'll give her some morphine for pain control    9:10 PM- patient's white count is within normal limits    9:59 PM- patient's urinalysis does show large bacteria and greater than 120 WBCs along with with blood. I will send her for a CT stone protocol at this point    10:40 Pm- patient's CT abdomen pelvis reveals a 5 mm stone at the uteropelvic junction on the right side. There is mild right hydronephrosis. Gallbladder ultrasound reveals no evidence for stones in the gallbladder or evidence of cholecystitis.    10:52 PM- I spoke with Dr. Junior. Is okay with her going home with pain medication and antibiotics. We will have them call the office tomorrow to set up follow-up appointment.    11:13 PM- patient feels quite a  bit better at this point. She is in no acute distress. I will send her home some Norco for pain control. I will put her on some Bactrim as well pre-she is to call urology tomorrow to set up follow-up appointment. Made it very clear that if symptoms worsen or return or she develops any new symptoms such as fever she is to return to the ER for reevaluation    FINAL IMPRESSION    Ureterolithiasis         Jam Saavedra, DO  01/31/17 0313     show

## 2023-03-31 NOTE — ED PROVIDER NOTE - PATIENT PORTAL LINK FT
You can access the FollowMyHealth Patient Portal offered by [facility] by registering at the following website: [email or web address]. By joining [facility]’s FollowMyHealth portal, you will also be able to view your health information using other applications (apps) compatible with our system.

## 2023-03-31 NOTE — ED PROVIDER NOTE - CARE PROVIDER_API CALL
[facility]  Internal Medicine  [address] [place] [address]  Phone: [phone]  Fax: [phone]  Follow Up Time: 1-3 Days

## 2023-03-31 NOTE — ED PROVIDER NOTE - NS ED ROS FT
GEN:  no fever, no chills, no generalized weakness  NEURO:  no headache, no dizziness  ENT: no sore throat, no runny nose  CV:  no chest pain, no palpitations  RESP:  no sob, no cough, +phlegm  GI:  no nausea, no vomiting  MSK:  no joint pain, no edema  SKIN:  no rash, no bruising

## 2023-03-31 NOTE — ED ADULT TRIAGE NOTE - CHIEF COMPLAINT QUOTE
Patient complaining of heavy mucus that she keeps spitting up. Denies any chest pain, heartburn, nausea or vomiting.

## 2023-03-31 NOTE — ED PROVIDER NOTE - PHYSICAL EXAMINATION
CONSTITUTIONAL: Well-developed; well-nourished; in no acute distress.   SKIN: warm, dry  HEAD: Normocephalic  ENT: No nasal discharge; airway clear.  NECK: Supple; non tender.  CARD: S1, S2 normal;  Regular rate and rhythm.   RESP: No wheezes, rales or rhonchi.  ABD: soft ntnd  NEURO: Alert, oriented, grossly unremarkable  PSYCH: Cooperative, appropriate.

## 2023-04-14 ENCOUNTER — APPOINTMENT (OUTPATIENT)
Dept: GASTROENTEROLOGY | Facility: CLINIC | Age: 78
End: 2023-04-14
Payer: MEDICARE

## 2023-04-14 VITALS
HEIGHT: 62 IN | BODY MASS INDEX: 22.45 KG/M2 | WEIGHT: 122 LBS | DIASTOLIC BLOOD PRESSURE: 87 MMHG | OXYGEN SATURATION: 96 % | HEART RATE: 67 BPM | SYSTOLIC BLOOD PRESSURE: 137 MMHG

## 2023-04-14 PROCEDURE — 99214 OFFICE O/P EST MOD 30 MIN: CPT

## 2023-04-14 NOTE — ASSESSMENT
[FreeTextEntry1] : Patient still can complaining of sour taste in the mouth and an exudate in the mouth.  She is also complaining of bloating patient has lost about 8 pounds since her last visit.\par Recommendation advised to take pantoprazole 1 tablet in the morning and famotidine in the night.\par To obtain a gastric emptying study.\par Check TSH for weight loss.  Patient states that she has had a colonoscopy at another place in the past if her weight loss pill continues we will consider repeat colonoscopy next visit follow-up in 4 to 6 weeks.

## 2023-04-14 NOTE — REASON FOR VISIT
[Follow-up] : a follow-up of an existing diagnosis [Spouse] : spouse [FreeTextEntry1] : Difficulty Swallowing/weight loss

## 2023-04-14 NOTE — HISTORY OF PRESENT ILLNESS
[FreeTextEntry1] : Patient has undergone EGD manometric and a Bravo study.  The manometry showed normal esophageal motility.  Bravo study was consistent with reflux.  Today she continues to complain of bloating and nausea.  Today she is also complaining of weight loss she was 130 LB's last year and today is about 122 LB's.

## 2023-04-21 NOTE — H&P PST ADULT - NEUROLOGICAL
Discussed findings of this on MRI last year during stroke admission  -advised patient to f/u with ENT regarding this as outpatient. He understands and is amenable to doing so
Alert & oriented; no sensory, motor or coordination deficits, normal reflexes

## 2023-04-24 ENCOUNTER — APPOINTMENT (OUTPATIENT)
Dept: OTOLARYNGOLOGY | Facility: CLINIC | Age: 78
End: 2023-04-24
Payer: MEDICARE

## 2023-04-24 DIAGNOSIS — R09.89 OTHER SPECIFIED SYMPTOMS AND SIGNS INVOLVING THE CIRCULATORY AND RESPIRATORY SYSTEMS: ICD-10-CM

## 2023-04-24 DIAGNOSIS — J34.89 OTHER SPECIFIED DISORDERS OF NOSE AND NASAL SINUSES: ICD-10-CM

## 2023-04-24 DIAGNOSIS — R14.0 ABDOMINAL DISTENSION (GASEOUS): ICD-10-CM

## 2023-04-24 PROCEDURE — 99213 OFFICE O/P EST LOW 20 MIN: CPT | Mod: 25

## 2023-04-24 PROCEDURE — 31575 DIAGNOSTIC LARYNGOSCOPY: CPT

## 2023-04-24 NOTE — REASON FOR VISIT
[Subsequent Evaluation] : a subsequent evaluation for [FreeTextEntry2] : oral candidiasis, history of dry mouth , laryngitis

## 2023-04-24 NOTE — HISTORY OF PRESENT ILLNESS
[FreeTextEntry1] : Patient following up today on oral candidiasis, history of dry mouth , laryngitis . Patient states she is feeling a little better and her symptoms have resolved to a degree. Pt has bloating and nausea. Pt seen by GI and notes was reviewed from 4/14/23.

## 2023-04-24 NOTE — PROCEDURE
[None] : none [Flexible Endoscope] : examined with the flexible endoscope [Normal] : posterior cricoid area had healthy pink mucosa in the interarytenoid area and the esophageal inlet [de-identified] : lprd

## 2023-04-26 ENCOUNTER — OUTPATIENT (OUTPATIENT)
Dept: OUTPATIENT SERVICES | Facility: HOSPITAL | Age: 78
LOS: 1 days | End: 2023-04-26
Payer: MEDICARE

## 2023-04-26 DIAGNOSIS — G56.03 CARPAL TUNNEL SYNDROME, BILATERAL UPPER LIMBS: Chronic | ICD-10-CM

## 2023-04-26 DIAGNOSIS — K21.9 GASTRO-ESOPHAGEAL REFLUX DISEASE WITHOUT ESOPHAGITIS: ICD-10-CM

## 2023-04-26 DIAGNOSIS — Z98.890 OTHER SPECIFIED POSTPROCEDURAL STATES: Chronic | ICD-10-CM

## 2023-04-26 DIAGNOSIS — Z90.49 ACQUIRED ABSENCE OF OTHER SPECIFIED PARTS OF DIGESTIVE TRACT: Chronic | ICD-10-CM

## 2023-04-26 PROCEDURE — 78264 GASTRIC EMPTYING IMG STUDY: CPT | Mod: 26

## 2023-04-26 PROCEDURE — A9541: CPT

## 2023-04-26 PROCEDURE — 78264 GASTRIC EMPTYING IMG STUDY: CPT

## 2023-04-27 DIAGNOSIS — K21.9 GASTRO-ESOPHAGEAL REFLUX DISEASE WITHOUT ESOPHAGITIS: ICD-10-CM

## 2023-05-05 ENCOUNTER — NON-APPOINTMENT (OUTPATIENT)
Age: 78
End: 2023-05-05

## 2023-05-12 ENCOUNTER — APPOINTMENT (OUTPATIENT)
Dept: GASTROENTEROLOGY | Facility: CLINIC | Age: 78
End: 2023-05-12
Payer: MEDICARE

## 2023-05-12 VITALS
DIASTOLIC BLOOD PRESSURE: 83 MMHG | WEIGHT: 120.8 LBS | OXYGEN SATURATION: 98 % | HEART RATE: 76 BPM | HEIGHT: 62 IN | BODY MASS INDEX: 22.23 KG/M2 | SYSTOLIC BLOOD PRESSURE: 105 MMHG

## 2023-05-12 DIAGNOSIS — Z78.9 OTHER SPECIFIED HEALTH STATUS: ICD-10-CM

## 2023-05-12 PROCEDURE — 99214 OFFICE O/P EST MOD 30 MIN: CPT

## 2023-05-12 RX ORDER — PANTOPRAZOLE 40 MG/1
40 TABLET, DELAYED RELEASE ORAL
Qty: 30 | Refills: 3 | Status: DISCONTINUED | COMMUNITY
Start: 2021-11-18 | End: 2023-05-12

## 2023-05-12 RX ORDER — FAMOTIDINE 40 MG/1
40 TABLET, FILM COATED ORAL
Qty: 30 | Refills: 3 | Status: DISCONTINUED | COMMUNITY
Start: 2021-07-22 | End: 2023-05-12

## 2023-05-12 NOTE — PHYSICAL EXAM
[Alert] : alert [Normal Voice/Communication] : normal voice/communication [No Acute Distress] : no acute distress [Well Developed] : well developed [Well Nourished] : well nourished

## 2023-05-15 NOTE — REVIEW OF SYSTEMS
[As Noted in HPI] : as noted in HPI [Recent Weight Loss (___ Lbs)] : recent [unfilled] ~Ulb weight loss [Heartburn] : heartburn [Bloating (gassiness)] : bloating [Negative] : Heme/Lymph [Fever] : no fever [Chills] : no chills [Feeling Poorly] : not feeling poorly [Feeling Tired] : not feeling tired [Recent Weight Gain (___ Lbs)] : no recent weight gain [Abdominal Pain] : no abdominal pain [Vomiting] : no vomiting [Constipation] : no constipation [Diarrhea] : no diarrhea [Melena (black stool)] : no melena [Bleeding] : no bleeding [Fecal Incontinence (soiling)] : no fecal incontinence

## 2023-05-15 NOTE — HISTORY OF PRESENT ILLNESS
[FreeTextEntry1] : 77 yr old female with weight loss, bloating, with persistent burning in her throat, poor appetite for F/U. She had s gastric emptying study done which showed gastroparesis. She was still C/O always feeling full with bloating and she lost 2 lbs in the past month. She denied dysphagia, abdominal pain, vomiting, constipation, diarrhea, melena, or blood in the stool.  [de-identified] : 04/26/2023

## 2023-05-15 NOTE — ASSESSMENT
[FreeTextEntry1] : 77 yr old female with weight loss, bloating, with persistent burning in her throat, poor appetite for F/U. She had s gastric emptying study done which showed gastroparesis. She was still C/O always feeling full with bloating and she lost 2 lbs in the past month. She denied dysphagia, abdominal pain, vomiting, constipation, diarrhea, melena, or blood in the stool.\par \par \par Gastroparesis\par \par - Results of gastric emptying study discussed with patient\par - She was told to eat small portions and eat very slowly. She was also told to drink a lot of water with her meals.\par - She was also instructed to walk a lot after eating to try to increase the gut motility.\par - F/U in 8 weeks\par - She was told to call the office in a few weeks if her symptoms don't improve; will hold off on meds for now

## 2023-05-22 ENCOUNTER — NON-APPOINTMENT (OUTPATIENT)
Age: 78
End: 2023-05-22

## 2023-06-16 ENCOUNTER — APPOINTMENT (OUTPATIENT)
Dept: GASTROENTEROLOGY | Facility: CLINIC | Age: 78
End: 2023-06-16
Payer: MEDICARE

## 2023-06-16 VITALS
SYSTOLIC BLOOD PRESSURE: 124 MMHG | OXYGEN SATURATION: 98 % | WEIGHT: 118.6 LBS | HEART RATE: 66 BPM | DIASTOLIC BLOOD PRESSURE: 81 MMHG | HEIGHT: 62 IN | BODY MASS INDEX: 21.83 KG/M2

## 2023-06-16 DIAGNOSIS — K31.84 GASTROPARESIS: ICD-10-CM

## 2023-06-16 PROCEDURE — 99214 OFFICE O/P EST MOD 30 MIN: CPT

## 2023-06-16 RX ORDER — SUCRALFATE 1 G/10ML
1 SUSPENSION ORAL 3 TIMES DAILY
Qty: 1 | Refills: 1 | Status: DISCONTINUED | COMMUNITY
Start: 2022-09-09 | End: 2023-06-16

## 2023-06-16 RX ORDER — ZINC OXIDE 13 %
CREAM (GRAM) TOPICAL
Qty: 30 | Refills: 3 | Status: DISCONTINUED | COMMUNITY
Start: 2023-02-07 | End: 2023-06-16

## 2023-06-16 RX ORDER — NYSTATIN 100000 [USP'U]/ML
100000 SUSPENSION ORAL 3 TIMES DAILY
Qty: 1 | Refills: 0 | Status: DISCONTINUED | COMMUNITY
Start: 2020-01-27 | End: 2023-06-16

## 2023-06-16 RX ORDER — MONTELUKAST 10 MG/1
10 TABLET, FILM COATED ORAL
Qty: 30 | Refills: 3 | Status: DISCONTINUED | COMMUNITY
Start: 2022-10-10 | End: 2023-06-16

## 2023-06-16 RX ORDER — BACITRACIN ZINC 500 [USP'U]/G
500 OINTMENT TOPICAL 3 TIMES DAILY
Qty: 1 | Refills: 4 | Status: DISCONTINUED | COMMUNITY
Start: 2023-01-25 | End: 2023-06-16

## 2023-06-16 RX ORDER — LORAZEPAM 1 MG/1
1 TABLET ORAL
Qty: 60 | Refills: 0 | Status: DISCONTINUED | COMMUNITY
Start: 2017-04-20 | End: 2023-06-16

## 2023-06-16 RX ORDER — NORTRIPTYLINE HYDROCHLORIDE 10 MG/1
10 CAPSULE ORAL
Qty: 30 | Refills: 5 | Status: DISCONTINUED | COMMUNITY
Start: 2019-10-23 | End: 2023-06-16

## 2023-06-16 RX ORDER — ZOLMITRIPTAN 2.5 MG/1
2.5 TABLET, FILM COATED ORAL
Qty: 12 | Refills: 5 | Status: DISCONTINUED | COMMUNITY
Start: 2019-10-23 | End: 2023-06-16

## 2023-06-16 RX ORDER — FLUTICASONE PROPIONATE 50 UG/1
50 SPRAY, METERED NASAL DAILY
Qty: 1 | Refills: 7 | Status: DISCONTINUED | COMMUNITY
Start: 2023-01-05 | End: 2023-06-16

## 2023-06-16 NOTE — HISTORY OF PRESENT ILLNESS
[FreeTextEntry1] : 77-year-old female patient being followed up for GERD, gastroparesis, and weight loss, her esophageal motility was normal and her Bravo test was consistent with GERD.  Gastric emptying study showed gastroparesis.  She takes pantoprazole 40 mg in the morning and famotidine 40 mg prior to going to bed.  On her last visit she was advised to eat small meals with small meals with lots of liquids and to ambulate after meals.  She is doing well with this regimen.

## 2023-06-16 NOTE — ASSESSMENT
[FreeTextEntry1] : Problem 1 GERD continue pantoprazole 40 mg in the morning and famotidine prior to bed.\par Problem 2 gastroparesis to continue small meals with lots of fluids and ambulation after meals.\par Problem 3 weight loss patient advised to get results of previous colonoscopy if weight loss persists will consider repeat colonoscopy.\par \par

## 2023-06-19 ENCOUNTER — APPOINTMENT (OUTPATIENT)
Dept: PULMONOLOGY | Facility: CLINIC | Age: 78
End: 2023-06-19
Payer: MEDICARE

## 2023-06-19 VITALS
OXYGEN SATURATION: 98 % | HEART RATE: 72 BPM | DIASTOLIC BLOOD PRESSURE: 82 MMHG | HEIGHT: 62 IN | BODY MASS INDEX: 22.08 KG/M2 | RESPIRATION RATE: 14 BRPM | SYSTOLIC BLOOD PRESSURE: 130 MMHG | WEIGHT: 120 LBS

## 2023-06-19 DIAGNOSIS — R05.3 CHRONIC COUGH: ICD-10-CM

## 2023-06-19 PROCEDURE — 99212 OFFICE O/P EST SF 10 MIN: CPT

## 2023-07-02 ENCOUNTER — EMERGENCY (EMERGENCY)
Facility: HOSPITAL | Age: 78
LOS: 0 days | Discharge: HOME | End: 2023-07-02
Attending: EMERGENCY MEDICINE
Payer: MEDICARE

## 2023-07-02 VITALS
HEIGHT: 62 IN | OXYGEN SATURATION: 97 % | TEMPERATURE: 98 F | DIASTOLIC BLOOD PRESSURE: 82 MMHG | WEIGHT: 126.99 LBS | RESPIRATION RATE: 18 BRPM | SYSTOLIC BLOOD PRESSURE: 179 MMHG | HEART RATE: 70 BPM

## 2023-07-02 DIAGNOSIS — Z90.49 ACQUIRED ABSENCE OF OTHER SPECIFIED PARTS OF DIGESTIVE TRACT: Chronic | ICD-10-CM

## 2023-07-02 DIAGNOSIS — Z98.890 OTHER SPECIFIED POSTPROCEDURAL STATES: Chronic | ICD-10-CM

## 2023-07-02 DIAGNOSIS — I10 ESSENTIAL (PRIMARY) HYPERTENSION: ICD-10-CM

## 2023-07-02 DIAGNOSIS — R07.0 PAIN IN THROAT: ICD-10-CM

## 2023-07-02 DIAGNOSIS — Z00.00 ENCOUNTER FOR GENERAL ADULT MEDICAL EXAMINATION WITHOUT ABNORMAL FINDINGS: ICD-10-CM

## 2023-07-02 DIAGNOSIS — Z88.2 ALLERGY STATUS TO SULFONAMIDES: ICD-10-CM

## 2023-07-02 DIAGNOSIS — K21.9 GASTRO-ESOPHAGEAL REFLUX DISEASE WITHOUT ESOPHAGITIS: ICD-10-CM

## 2023-07-02 DIAGNOSIS — G89.29 OTHER CHRONIC PAIN: ICD-10-CM

## 2023-07-02 DIAGNOSIS — G56.03 CARPAL TUNNEL SYNDROME, BILATERAL UPPER LIMBS: Chronic | ICD-10-CM

## 2023-07-02 DIAGNOSIS — F41.9 ANXIETY DISORDER, UNSPECIFIED: ICD-10-CM

## 2023-07-02 PROCEDURE — 99283 EMERGENCY DEPT VISIT LOW MDM: CPT

## 2023-07-02 PROCEDURE — 99282 EMERGENCY DEPT VISIT SF MDM: CPT

## 2023-07-02 NOTE — ED PROVIDER NOTE - ATTENDING CONTRIBUTION TO CARE
I personally evaluated the patient. I reviewed the Resident´s or Physician Assistant´s note (as assigned above), and agree with the findings and plan except as documented in my note.    77-year-old female presents to the emergency department complaining of seeing white material in her throat, worse in the morning when she wakes up, not associated with any other constitutional symptoms.  Denies nasal congestion, but admits to a bruise in her mouth from trying to manipulate her fingers in her throat.  Also wears a dental bridge.   who is present in the ED shows a photo of what they are complaining about for clarity.    GENERAL: Female in no distress.   HEENT: EOMI non icteric.  No nasal congestion.  Throat normal.  Uvula midline normal.  No exudates no erythema.  No hypertrophy. Right anterior buccal surface with a bruise noted bridge noted as well.  CHEST: normal work of breathing noted  CV: pulses intact   EXTR: FROM     NEURO: AAO 3 no focal deficits  SKIN: normal no pallor    Impression: Medical screening exam    Plan: Supportive care, outpatient management

## 2023-07-02 NOTE — ED PROVIDER NOTE - CLINICAL SUMMARY MEDICAL DECISION MAKING FREE TEXT BOX
77-year-old female presents to the emergency department for noticing white spots in her throat.  In the emergency department screening exam, reevaluation, no acute emergent findings.   demonstrates photos of the concern, appears to be postnasal drip.  Will discharge with outpatient management return and follow-up instructions

## 2023-07-02 NOTE — ED ADULT NURSE NOTE - IS THE PATIENT ABLE TO BE SCREENED?
Obstetric HPI:  Patient reports onset about 2 hrs prior to arrival contractions, active fetal movement, absent vaginal bleeding , absent loss of fluid      Objective:     Vital Signs (Most Recent):  Temp: 97.5 °F (36.4 °C) (12/03/22 1954)  Pulse: 108 (12/03/22 1954)  BP: 116/67 (12/03/22 1954) Vital Signs (24h Range):  Temp:  [97.5 °F (36.4 °C)] 97.5 °F (36.4 °C)  Pulse:  [108] 108  BP: (116)/(67) 116/67     Weight: 88.4 kg (194 lb 12.8 oz)  Body mass index is 33.44 kg/m².    FHT: 135 Cat 1 (reassuring)  TOCO:  Q irritability minutes    No intake or output data in the 24 hours ending 12/03/22 2015    Cervical Exam:  Dilation:  3  Effacement:  70  Station: -2  Presentation: Vertex     Significant Labs:  Recent Lab Results         12/03/22 1956        Appearance, UA Clear       Bacteria, UA Moderate       Bilirubin (UA) Negative       Ca Oxalate [place], UA Rare       Color, UA Yellow       Glucose, UA Normal       Hyaline Casts, UA 0-2       Ketones, UA Trace       Leukocytes, UA Small       Mucus, UA Moderate       NITRITE UA Negative       Occult Blood UA Negative       pH, UA 6.5       Protein, UA 50       RBC, UA None Seen       Specific Gravity, UA 1.029       Squam Epithel, UA Occasional       Trichomonas, UA None Seen       UROBILINOGEN UA 2       WBC, UA 0-5       Yeast, UA None Seen               Physical Exam:   Constitutional: She is oriented to person, place, and time. She appears well-developed and well-nourished.    HENT:   Head: Normocephalic and atraumatic.    Eyes: Pupils are equal, round, and reactive to light.     Cardiovascular:  Normal rate.      Exam reveals no edema.        Pulmonary/Chest: Effort normal. No respiratory distress.        Abdominal: Soft. She exhibits no distension and no mass. There is no abdominal tenderness. There is no rebound and no guarding.     Gravid, uterus nontender     Genitourinary:    Vagina normal.      Pelvic exam was performed with patient supine.   The external  female genitalia was normal.   No external genitalia lesions identified,Genitalia hair distrobution normal .   No  no vaginal discharge in the vagina.           Musculoskeletal: Normal range of motion.       Neurological: She is alert and oriented to person, place, and time. She exhibits normal muscle tone.    Skin: Skin is warm and dry. No rash noted. No erythema.    Psychiatric: She has a normal mood and affect. Her behavior is normal.     Review of Systems   Constitutional:  Negative for chills and fever.   Respiratory:  Negative for cough and shortness of breath.    Cardiovascular:  Negative for chest pain and leg swelling.   Gastrointestinal:  Negative for abdominal pain, constipation, diarrhea, nausea and vomiting.   Genitourinary:  Positive for pelvic pain (? contractions). Negative for dysuria, flank pain, frequency, hematuria, urgency, vaginal bleeding, vaginal discharge, vaginal pain and vaginal odor.   Musculoskeletal:  Negative for back pain.   Neurological:  Negative for headaches.   Psychiatric/Behavioral:  Negative for depression. The patient is not nervous/anxious.     Yes

## 2023-07-02 NOTE — ED PROVIDER NOTE - PHYSICAL EXAMINATION
Gen: Alert, NAD, well appearing  Head: NC, AT, EOM  ENT: normal hearing, patent oropharynx without erythema/exudate, uvula midline  Pulm: Bilateral BS, normal resp effort, no wheeze/stridor/retractions  CV: RRR, no M/R/G, +dist pulses  Skin: no rash, warm/dry  Neuro: AAOx3, no sensory/motor deficits, CN 2-12 grossly intact Gen: Alert, NAD, well appearing  Head: NC, AT, EOM  ENT: normal hearing, patent oropharynx without erythema/exudate, tonsils not enlarged, uvula normal size, midline  Pulm: Bilateral BS, normal resp effort, no wheeze/stridor/retractions  CV: RRR, no M/R/G, +dist pulses  Skin: no rash, warm/dry  Neuro: AAOx3, no sensory/motor deficits, CN 2-12 grossly intact

## 2023-07-02 NOTE — ED PROVIDER NOTE - PATIENT PORTAL LINK FT
You can access the FollowMyHealth Patient Portal offered by [facility] by registering at the following website: [email or web address]. By joining [facility]’s FollowMyHealth portal, you will also be able to view your health information using other applications (apps) compatible with our system.

## 2023-07-02 NOTE — ED PROVIDER NOTE - CARE PROVIDER_API CALL
Niko Fernando  Otolaryngology  [address] [place] [id number]  Phone: [phone]  Fax: [phone]  Established Patient  Follow Up Time: Routine

## 2023-07-02 NOTE — ED ADULT NURSE NOTE - NSFALLUNIVINTERV_ED_ALL_ED
[facility]    Patient seen and examined, refer to note dated 11/01/2021.  Severe symptomatic aortic stenosis, NYHA class III symptoms  Labile blood sugars, but currently stable  We will proceed with NIDA as scheduled today  Benefits, risks and alternatives discussed with her and she is agreeable to proceed    TELMA Coburn MD  
Bed/Stretcher in lowest position, wheels locked, appropriate side rails in place/Call bell, personal items and telephone in reach/Instruct patient to call for assistance before getting out of bed/chair/stretcher/Non-slip footwear applied when patient is off stretcher/[place] to call system/Physically safe environment - no spills, clutter or unnecessary equipment/Purposeful proactive rounding/Room/bathroom lighting operational, light cord in reach

## 2023-07-02 NOTE — ED PROVIDER NOTE - NSFOLLOWUPINSTRUCTIONS_ED_ALL_ED_FT
Please follow-up with Dr. Fernando. Return precautions explained in full to patient/family.    Our Emergency Department Referral Coordinators will be reaching out to you in the next 24-48 hours from 9:00am to 5:00pm with a follow up appointment. Please expect a phone call from the hospital in that time frame. If you do not receive a call or if you have any questions or concerns, you can reach them at   [phone]    Pharyngitis    Pharyngitis is inflammation of your pharynx, which is typically caused by a viral or bacterial infection. Pharyngitis can be contagious and may spread from person to person through intimate contact, coughing, sneezing, or sharing personal items and utensils. Symptoms of pharyngitis may include sore throat, fever, headache, or swollen lymph nodes. If you are prescribed antibiotics, make sure you finish them even if you start to feel better. Gargle with salt water as often as every 1-2 hours to soothe your throat. Throat lozenges (if you are not at risk for choking) or sprays may be used to soothe your throat.    SEEK IMMEDIATE MEDICAL CARE IF YOU HAVE ANY OF THE FOLLOWING SYMPTOMS: neck stiffness, drooling, hoarseness or change in voice, inability to swallow liquids, vomiting, or trouble breathing.

## 2023-07-02 NOTE — ED PROVIDER NOTE - OBJECTIVE STATEMENT
77-year-old female past medical history of HTN, GERD, anxiety, chronic throat pain who has seen Dr. Fernando in the past here with complaints of throat issues.  Patient reports that she has some white discharge which appears when she presses the back of her throat, does not hurt just bothers her.  Denies any other associated symptoms except for the fact that she does not like them. Denies any fever, chills, nausea, vomiting, CP, SOB, changes in urination, or changes in bowel movements.

## 2023-07-05 ENCOUNTER — APPOINTMENT (OUTPATIENT)
Dept: OTOLARYNGOLOGY | Facility: CLINIC | Age: 78
End: 2023-07-05
Payer: MEDICARE

## 2023-07-05 DIAGNOSIS — K21.9 GASTRO-ESOPHAGEAL REFLUX DISEASE W/OUT ESOPHAGITIS: ICD-10-CM

## 2023-07-05 DIAGNOSIS — R13.10 DYSPHAGIA, UNSPECIFIED: ICD-10-CM

## 2023-07-05 PROCEDURE — 99214 OFFICE O/P EST MOD 30 MIN: CPT | Mod: 25

## 2023-07-05 PROCEDURE — 31575 DIAGNOSTIC LARYNGOSCOPY: CPT

## 2023-07-05 RX ORDER — NYSTATIN 100000 [USP'U]/ML
100000 SUSPENSION ORAL 3 TIMES DAILY
Qty: 1 | Refills: 0 | Status: ACTIVE | COMMUNITY
Start: 2023-07-05 | End: 1900-01-01

## 2023-07-05 NOTE — HISTORY OF PRESENT ILLNESS
[FreeTextEntry1] : Patient returns today c/o acid reflux ,choking sensation and nasal obstruction . She was seen on 7/2/23 ,  for pharyngitis .  She has white spots in throat . She denies any pain when swallowing. She has  bad odor in mouth. Taking pantoprazole and famotidine without relief. ED notes reviewed,  present for interview. Notes reviewed from pulmonary.

## 2023-07-05 NOTE — PHYSICAL EXAM
[Nasal Endoscopy Performed] : nasal endoscopy was performed, see procedure section for findings [de-identified] : edema  [Normal] : mucosa is normal [Midline] : trachea located in midline position

## 2023-07-05 NOTE — REASON FOR VISIT
[Subsequent Evaluation] : a subsequent evaluation for [FreeTextEntry2] : acid reflux ,choking sensation and nasal obstruction

## 2023-07-05 NOTE — PROCEDURE
[Flexible Endoscope] : examined with the flexible endoscope [Normal] : posterior cricoid area had healthy pink mucosa in the interarytenoid area and the esophageal inlet [de-identified] : purulence

## 2023-07-12 LAB — EAR NOSE AND THROAT CULTURE: ABNORMAL

## 2023-07-12 NOTE — H&P PST ADULT - NS PRO FEM REPRO HEALTH SCREEN
mammogram Rinvoq Counseling: I discussed with the patient the risks of Rinvoq therapy including but not limited to upper respiratory tract infections, shingles, cold sores, bronchitis, nausea, cough, fever, acne, and headache. Live vaccines should be avoided.  This medication has been linked to serious infections; higher rate of mortality; malignancy and lymphoproliferative disorders; major adverse cardiovascular events; thrombosis; thrombocytopenia, anemia, and neutropenia; lipid elevations; liver enzyme elevations; and gastrointestinal perforations.

## 2023-07-14 ENCOUNTER — APPOINTMENT (OUTPATIENT)
Dept: GASTROENTEROLOGY | Facility: CLINIC | Age: 78
End: 2023-07-14

## 2023-07-25 RX ORDER — FAMOTIDINE 40 MG/1
40 TABLET, FILM COATED ORAL
Qty: 30 | Refills: 3 | Status: ACTIVE | COMMUNITY
Start: 2023-04-14 | End: 1900-01-01

## 2023-08-02 ENCOUNTER — EMERGENCY (EMERGENCY)
Facility: HOSPITAL | Age: 78
LOS: 0 days | Discharge: HOME | End: 2023-08-02
Attending: STUDENT IN AN ORGANIZED HEALTH CARE EDUCATION/TRAINING PROGRAM
Payer: MEDICARE

## 2023-08-02 VITALS
WEIGHT: 125 LBS | RESPIRATION RATE: 18 BRPM | DIASTOLIC BLOOD PRESSURE: 60 MMHG | OXYGEN SATURATION: 97 % | SYSTOLIC BLOOD PRESSURE: 126 MMHG | TEMPERATURE: 99 F | HEIGHT: 62 IN | HEART RATE: 74 BPM

## 2023-08-02 DIAGNOSIS — Z98.890 OTHER SPECIFIED POSTPROCEDURAL STATES: Chronic | ICD-10-CM

## 2023-08-02 DIAGNOSIS — I10 ESSENTIAL (PRIMARY) HYPERTENSION: ICD-10-CM

## 2023-08-02 DIAGNOSIS — F03.90 UNSPECIFIED DEMENTIA, UNSPECIFIED SEVERITY, WITHOUT BEHAVIORAL DISTURBANCE, PSYCHOTIC DISTURBANCE, MOOD DISTURBANCE, AND ANXIETY: ICD-10-CM

## 2023-08-02 DIAGNOSIS — Z87.440 PERSONAL HISTORY OF URINARY (TRACT) INFECTIONS: ICD-10-CM

## 2023-08-02 DIAGNOSIS — G56.03 CARPAL TUNNEL SYNDROME, BILATERAL UPPER LIMBS: Chronic | ICD-10-CM

## 2023-08-02 DIAGNOSIS — R41.0 DISORIENTATION, UNSPECIFIED: ICD-10-CM

## 2023-08-02 DIAGNOSIS — Z88.1 ALLERGY STATUS TO OTHER ANTIBIOTIC AGENTS STATUS: ICD-10-CM

## 2023-08-02 DIAGNOSIS — Z90.49 ACQUIRED ABSENCE OF OTHER SPECIFIED PARTS OF DIGESTIVE TRACT: Chronic | ICD-10-CM

## 2023-08-02 DIAGNOSIS — F41.9 ANXIETY DISORDER, UNSPECIFIED: ICD-10-CM

## 2023-08-02 DIAGNOSIS — F32.A DEPRESSION, UNSPECIFIED: ICD-10-CM

## 2023-08-02 DIAGNOSIS — K21.9 GASTRO-ESOPHAGEAL REFLUX DISEASE WITHOUT ESOPHAGITIS: ICD-10-CM

## 2023-08-02 LAB
ALBUMIN SERPL ELPH-MCNC: 4.8 G/DL — SIGNIFICANT CHANGE UP (ref 3.5–5.2)
ALP SERPL-CCNC: 97 U/L — SIGNIFICANT CHANGE UP (ref 30–115)
ALT FLD-CCNC: 10 U/L — SIGNIFICANT CHANGE UP (ref 0–41)
ANION GAP SERPL CALC-SCNC: 9 MMOL/L — SIGNIFICANT CHANGE UP (ref 7–14)
APPEARANCE UR: CLEAR — SIGNIFICANT CHANGE UP
AST SERPL-CCNC: 21 U/L — SIGNIFICANT CHANGE UP (ref 0–41)
BACTERIA # UR AUTO: NEGATIVE /HPF — SIGNIFICANT CHANGE UP
BASOPHILS # BLD AUTO: 0.03 K/UL — SIGNIFICANT CHANGE UP (ref 0–0.2)
BASOPHILS NFR BLD AUTO: 0.4 % — SIGNIFICANT CHANGE UP (ref 0–1)
BILIRUB SERPL-MCNC: 0.3 MG/DL — SIGNIFICANT CHANGE UP (ref 0.2–1.2)
BILIRUB UR-MCNC: NEGATIVE — SIGNIFICANT CHANGE UP
BUN SERPL-MCNC: 18 MG/DL — SIGNIFICANT CHANGE UP (ref 10–20)
CALCIUM SERPL-MCNC: 9.8 MG/DL — SIGNIFICANT CHANGE UP (ref 8.4–10.5)
CAST: 1 /LPF — SIGNIFICANT CHANGE UP (ref 0–4)
CHLORIDE SERPL-SCNC: 103 MMOL/L — SIGNIFICANT CHANGE UP (ref 98–110)
CO2 SERPL-SCNC: 28 MMOL/L — SIGNIFICANT CHANGE UP (ref 17–32)
COLOR SPEC: YELLOW — SIGNIFICANT CHANGE UP
CREAT SERPL-MCNC: 0.7 MG/DL — SIGNIFICANT CHANGE UP (ref 0.7–1.5)
DIFF PNL FLD: NEGATIVE — SIGNIFICANT CHANGE UP
EGFR: 89 ML/MIN/1.73M2 — SIGNIFICANT CHANGE UP
EOSINOPHIL # BLD AUTO: 0.32 K/UL — SIGNIFICANT CHANGE UP (ref 0–0.7)
EOSINOPHIL NFR BLD AUTO: 4.3 % — SIGNIFICANT CHANGE UP (ref 0–8)
GLUCOSE SERPL-MCNC: 111 MG/DL — HIGH (ref 70–99)
GLUCOSE UR QL: NEGATIVE MG/DL — SIGNIFICANT CHANGE UP
HCT VFR BLD CALC: 39.5 % — SIGNIFICANT CHANGE UP (ref 37–47)
HGB BLD-MCNC: 13.2 G/DL — SIGNIFICANT CHANGE UP (ref 12–16)
HYALINE CASTS # UR AUTO: 1 /LPF — SIGNIFICANT CHANGE UP (ref 0–4)
IMM GRANULOCYTES NFR BLD AUTO: 0.3 % — SIGNIFICANT CHANGE UP (ref 0.1–0.3)
KETONES UR-MCNC: NEGATIVE MG/DL — SIGNIFICANT CHANGE UP
LEUKOCYTE ESTERASE UR-ACNC: ABNORMAL
LYMPHOCYTES # BLD AUTO: 2.37 K/UL — SIGNIFICANT CHANGE UP (ref 1.2–3.4)
LYMPHOCYTES # BLD AUTO: 31.9 % — SIGNIFICANT CHANGE UP (ref 20.5–51.1)
MCHC RBC-ENTMCNC: 31.1 PG — HIGH (ref 27–31)
MCHC RBC-ENTMCNC: 33.4 G/DL — SIGNIFICANT CHANGE UP (ref 32–37)
MCV RBC AUTO: 92.9 FL — SIGNIFICANT CHANGE UP (ref 81–99)
MONOCYTES # BLD AUTO: 0.36 K/UL — SIGNIFICANT CHANGE UP (ref 0.1–0.6)
MONOCYTES NFR BLD AUTO: 4.8 % — SIGNIFICANT CHANGE UP (ref 1.7–9.3)
NEUTROPHILS # BLD AUTO: 4.34 K/UL — SIGNIFICANT CHANGE UP (ref 1.4–6.5)
NEUTROPHILS NFR BLD AUTO: 58.3 % — SIGNIFICANT CHANGE UP (ref 42.2–75.2)
NITRITE UR-MCNC: NEGATIVE — SIGNIFICANT CHANGE UP
NRBC # BLD: 0 /100 WBCS — SIGNIFICANT CHANGE UP (ref 0–0)
PH UR: 6 — SIGNIFICANT CHANGE UP (ref 5–8)
PLATELET # BLD AUTO: 159 K/UL — SIGNIFICANT CHANGE UP (ref 130–400)
PMV BLD: 11.9 FL — HIGH (ref 7.4–10.4)
POTASSIUM SERPL-MCNC: 4.2 MMOL/L — SIGNIFICANT CHANGE UP (ref 3.5–5)
POTASSIUM SERPL-SCNC: 4.2 MMOL/L — SIGNIFICANT CHANGE UP (ref 3.5–5)
PROT SERPL-MCNC: 7.2 G/DL — SIGNIFICANT CHANGE UP (ref 6–8)
PROT UR-MCNC: NEGATIVE MG/DL — SIGNIFICANT CHANGE UP
RBC # BLD: 4.25 M/UL — SIGNIFICANT CHANGE UP (ref 4.2–5.4)
RBC # FLD: 13.7 % — SIGNIFICANT CHANGE UP (ref 11.5–14.5)
RBC CASTS # UR COMP ASSIST: 0 /HPF — SIGNIFICANT CHANGE UP (ref 0–4)
SODIUM SERPL-SCNC: 140 MMOL/L — SIGNIFICANT CHANGE UP (ref 135–146)
SP GR SPEC: 1.02 — SIGNIFICANT CHANGE UP (ref 1–1.03)
SQUAMOUS # UR AUTO: 1 /HPF — SIGNIFICANT CHANGE UP (ref 0–5)
UROBILINOGEN FLD QL: 1 MG/DL — SIGNIFICANT CHANGE UP (ref 0.2–1)
WBC # BLD: 7.44 K/UL — SIGNIFICANT CHANGE UP (ref 4.8–10.8)
WBC # FLD AUTO: 7.44 K/UL — SIGNIFICANT CHANGE UP (ref 4.8–10.8)
WBC UR QL: 2 /HPF — SIGNIFICANT CHANGE UP (ref 0–5)

## 2023-08-02 PROCEDURE — 99285 EMERGENCY DEPT VISIT HI MDM: CPT | Mod: 25

## 2023-08-02 PROCEDURE — 93010 ELECTROCARDIOGRAM REPORT: CPT

## 2023-08-02 PROCEDURE — 70450 CT HEAD/BRAIN W/O DYE: CPT | Mod: MA

## 2023-08-02 PROCEDURE — 87086 URINE CULTURE/COLONY COUNT: CPT

## 2023-08-02 PROCEDURE — 70450 CT HEAD/BRAIN W/O DYE: CPT | Mod: 26,MA

## 2023-08-02 PROCEDURE — 80053 COMPREHEN METABOLIC PANEL: CPT

## 2023-08-02 PROCEDURE — 99284 EMERGENCY DEPT VISIT MOD MDM: CPT

## 2023-08-02 PROCEDURE — 81001 URINALYSIS AUTO W/SCOPE: CPT

## 2023-08-02 PROCEDURE — 36415 COLL VENOUS BLD VENIPUNCTURE: CPT

## 2023-08-02 PROCEDURE — 71046 X-RAY EXAM CHEST 2 VIEWS: CPT | Mod: 26

## 2023-08-02 PROCEDURE — 93005 ELECTROCARDIOGRAM TRACING: CPT

## 2023-08-02 PROCEDURE — 71046 X-RAY EXAM CHEST 2 VIEWS: CPT

## 2023-08-02 PROCEDURE — 85025 COMPLETE CBC W/AUTO DIFF WBC: CPT

## 2023-08-02 NOTE — ED PROVIDER NOTE - CLINICAL SUMMARY MEDICAL DECISION MAKING FREE TEXT BOX
78-year-old female with history as documented presenting today with intermittent episodes of confusion and short-term memory loss that she has had for year worsening over the last couple of months.  Patient is here with her son and her  who attest to changes in memory.  Patient has had a neurological workup in the past however at that time tested negative for the dementia screening.  Has had a CT and a workup that was nonrevealing at that time however symptoms have progressed.  There is a family history of dementia per patient's son.  Patient otherwise and oriented x 3 bedside, cooperative providing all appropriate answers.  Denies any medical complaints.  Unremarkable neurological exam, labs and imaging ordered, and reviewed.  No acute pathology is noted.  Symptoms likely related to dementia or other causes of degeneration, at this time no indication for admission.  Patient is instructed follow-up with neurology for further evaluation, return precautions discussed with the family who is agreeable to plan.

## 2023-08-02 NOTE — ED PROVIDER NOTE - PHYSICAL EXAMINATION
Physical Exam    Vital Signs: I have reviewed the initial vital signs.  Constitutional: appears stated age, no acute distress  Eyes: Conjunctiva pink, Sclera clear, PERRLA, EOMI.  Cardiovascular: S1 and S2, regular rate, regular rhythm, well-perfused extremities, radial pulses equal and 2+, pedal pulses 2+ and equal  Respiratory: unlabored respiratory effort, clear to auscultation bilaterally no wheezing, rales, or rhonchi  Gastrointestinal:  abdomen soft, non-tender, no pulsatile mass, normal bowl sounds  Musculoskeletal: supple neck, no lower extremity edema, no midline tenderness  Integumentary: warm, dry, no rash  Neurologic: awake, alert, oriented x3, extremities’ motor and sensory functions grossly intact, cranial nerves II-XII grossly intact

## 2023-08-02 NOTE — ED ADULT TRIAGE NOTE - CHIEF COMPLAINT QUOTE
pt has been having episodes of confusion for months currently a&o x3 - recently finished abx for uti  - hx depression/anxiety

## 2023-08-02 NOTE — ED PROVIDER NOTE - PATIENT PORTAL LINK FT
You can access the FollowMyHealth Patient Portal offered by  by registering at the following website: [email or web address]. By joining [facility]’s FollowMyHealth portal, you will also be able to view your health information using other applications (apps) compatible with our system.

## 2023-08-02 NOTE — ED ADULT NURSE NOTE - NSFALLUNIVINTERV_ED_ALL_ED
Bed/Stretcher in lowest position, wheels locked, appropriate side rails in place/Call bell, personal items and telephone in reach/Instruct patient to call for assistance before getting out of bed/chair/stretcher/Non-slip footwear applied when patient is off stretcher/[place] to call system/Physically safe environment - no spills, clutter or unnecessary equipment/Purposeful proactive rounding/Room/bathroom lighting operational, light cord in reach

## 2023-08-02 NOTE — ED PROVIDER NOTE - OBJECTIVE STATEMENT
78-year-old female with past medical history depression/anxiety, hypertension, GERD presents with complaints of intermittent episodes of confusion over the past few months since May.  Patient reports she has had a neurologist evaluate her for dementia in [facility] , and the dementia testing was negative.  Patient's family at bedside reports last night patient had difficulty recognizing her  called her son in tears asking why he moved to a new home, but he had not had any moves recently.  Patient was referred to ED by a psychiatrist at the [facility] to be evaluated for her symptoms.  Patient also reports she finished a course of antibiotics for UTI 2 weeks ago.  Denies fever/chills, chest pain, shortness of breath, recent illness, cough, sore throat, burning on urination, urgency/frequency/hematuria, headache, vision changes, rash, numbness/tingling, lightheadedness/weakness.

## 2023-08-02 NOTE — ED PROVIDER NOTE - NSFOLLOWUPINSTRUCTIONS_ED_ALL_ED_FT
Follow-up with your PCP in 1-3 days.  Follow-up with your neurologist at [facility].  Follow-up with your psyhiatrist at [facility]  [address] [place], [place] [id number] [phone]    [facility]  [address]  [place] [address]   [phone]

## 2023-08-04 LAB
CULTURE RESULTS: SIGNIFICANT CHANGE UP
SPECIMEN SOURCE: SIGNIFICANT CHANGE UP

## 2023-08-09 ENCOUNTER — APPOINTMENT (OUTPATIENT)
Dept: OTOLARYNGOLOGY | Facility: CLINIC | Age: 78
End: 2023-08-09

## 2023-08-09 ENCOUNTER — APPOINTMENT (OUTPATIENT)
Dept: OTOLARYNGOLOGY | Facility: CLINIC | Age: 78
End: 2023-08-09
Payer: MEDICARE

## 2023-08-09 DIAGNOSIS — B37.0 CANDIDAL STOMATITIS: ICD-10-CM

## 2023-08-09 DIAGNOSIS — H61.23 IMPACTED CERUMEN, BILATERAL: ICD-10-CM

## 2023-08-09 DIAGNOSIS — J31.2 CHRONIC PHARYNGITIS: ICD-10-CM

## 2023-08-09 PROCEDURE — 99214 OFFICE O/P EST MOD 30 MIN: CPT | Mod: 25

## 2023-08-09 PROCEDURE — 69210 REMOVE IMPACTED EAR WAX UNI: CPT

## 2023-08-09 PROCEDURE — 31575 DIAGNOSTIC LARYNGOSCOPY: CPT

## 2023-08-09 NOTE — REASON FOR VISIT
[Subsequent Evaluation] : a subsequent evaluation for [FreeTextEntry2] : oral candidiasis, acid reflux and dysphagia

## 2023-08-09 NOTE — PHYSICAL EXAM
[Normal] : mucosa is normal [Midline] : trachea located in midline position [de-identified] : B/L cerumen removed with curette and suction.

## 2023-08-09 NOTE — HISTORY OF PRESENT ILLNESS
[FreeTextEntry1] : Patient returns today c/o oral candidiasis, acid reflux and dysphagia. She has been using nystatin wash since last visit, still has white spot in throat.  Culture reviewed and susceptibilities reviewed. Gargles have minimal effect.

## 2023-08-09 NOTE — ASSESSMENT
[FreeTextEntry1] : B/L cerumen removed with curette and suction. Well tolerated. Denies complaints.

## 2023-08-09 NOTE — PROCEDURE
[Flexible Endoscope] : examined with the flexible endoscope [True Vocal Cords Erythematous] : bilateral true vocal cord edema [Normal] : posterior cricoid area had healthy pink mucosa in the interarytenoid area and the esophageal inlet

## 2023-08-25 NOTE — REVIEW OF SYSTEMS
Chief Complaint  Hip Pain (Ck lt hip)      History of Present Illness: The patient is here for repeat evaluation of her left hip. The patient reports minimal to no pain with her hip. She is walking with a walker. She does report consistent swelling in the ankle on the left side that is not fully elevated. She is wearing compression stockings. Prior HPI 7/3/2023:  The patient is here for repeat evaluation regarding her left hip. The patient is 6 weeks out from her left hip intramedullary nailing. The patient is still at the [facility] assisted living facility. She is doing aggressive rehab. She is walking with a walker. Prior HPI 6/5/2023:  Dar Jones is a pleasant 81 y.o. female here today for her first postop evaluation regarding her left hip. She sustained a left hip fracture that underwent intramedullary nailing on 5/14/2023. The patient is doing okay. She has an expected amount of postoperative pain. She denies any numbness and tingling down the leg. She denies any fevers or chills. Medical History:  Patient's medications, allergies, past medical, surgical, social and family histories were reviewed and updated as appropriate. Pertinent items are noted in HPI  Review of systems reviewed from Patient History Form dated on 8/25/23 and available in the patient's chart under the Media tab. Vital Signs: There were no vitals filed for this visit. Constitutional: In no apparent distress. Normal affect. Alert and oriented X3 and is cooperative. L Hip Examination:    Well-healed surgical incisions about the hip. Minimally tender to palpation to the lateral aspect of the hip. L4-S1 distribution intact without paresthesias. 5 out of 5 plantarflexion and dorsiflexion. No discomfort with circumduction of the hip. Nonpitting edema noted to the left ankle and foot.        Radiology:       2 views of the left femur taken in the office today demonstrates maintained alignment of
[Patient Intake Form Reviewed] : Patient intake form was reviewed

## 2023-09-07 NOTE — ED ADULT NURSE NOTE - BREATHING, MLM
Patient admit from PACU to 403 s/p R total knee revision. VSS. Dressing clean, dry, intact. AURA. Knee slightly bent upon admission. Patient oriented to room, call light system, and plan of care. Daughter at bedside. Bedside report received.    Spontaneous, unlabored and symmetrical

## 2023-09-11 ENCOUNTER — APPOINTMENT (OUTPATIENT)
Dept: OTOLARYNGOLOGY | Facility: CLINIC | Age: 78
End: 2023-09-11
Payer: MEDICARE

## 2023-09-11 DIAGNOSIS — J03.90 ACUTE TONSILLITIS, UNSPECIFIED: ICD-10-CM

## 2023-09-11 DIAGNOSIS — J35.01 CHRONIC TONSILLITIS: ICD-10-CM

## 2023-09-11 DIAGNOSIS — J35.8 OTHER CHRONIC DISEASES OF TONSILS AND ADENOIDS: ICD-10-CM

## 2023-09-11 PROCEDURE — 31575 DIAGNOSTIC LARYNGOSCOPY: CPT

## 2023-09-11 PROCEDURE — 99214 OFFICE O/P EST MOD 30 MIN: CPT | Mod: 25

## 2023-09-27 RX ORDER — PANTOPRAZOLE 40 MG/1
40 TABLET, DELAYED RELEASE ORAL
Qty: 30 | Refills: 3 | Status: ACTIVE | COMMUNITY
Start: 2023-04-14 | End: 1900-01-01

## 2023-10-05 ENCOUNTER — APPOINTMENT (OUTPATIENT)
Dept: OTOLARYNGOLOGY | Facility: CLINIC | Age: 78
End: 2023-10-05

## 2023-10-10 ENCOUNTER — EMERGENCY (EMERGENCY)
Facility: HOSPITAL | Age: 78
LOS: 0 days | Discharge: HOME | End: 2023-10-10
Attending: EMERGENCY MEDICINE
Payer: MEDICARE

## 2023-10-10 VITALS
SYSTOLIC BLOOD PRESSURE: 143 MMHG | OXYGEN SATURATION: 99 % | DIASTOLIC BLOOD PRESSURE: 76 MMHG | HEIGHT: 62 IN | TEMPERATURE: 98 F | HEART RATE: 71 BPM | RESPIRATION RATE: 18 BRPM | WEIGHT: 89.95 LBS

## 2023-10-10 DIAGNOSIS — R07.0 PAIN IN THROAT: ICD-10-CM

## 2023-10-10 DIAGNOSIS — Z98.890 OTHER SPECIFIED POSTPROCEDURAL STATES: Chronic | ICD-10-CM

## 2023-10-10 DIAGNOSIS — G56.03 CARPAL TUNNEL SYNDROME, BILATERAL UPPER LIMBS: Chronic | ICD-10-CM

## 2023-10-10 DIAGNOSIS — F41.9 ANXIETY DISORDER, UNSPECIFIED: ICD-10-CM

## 2023-10-10 DIAGNOSIS — Z90.49 ACQUIRED ABSENCE OF OTHER SPECIFIED PARTS OF DIGESTIVE TRACT: Chronic | ICD-10-CM

## 2023-10-10 DIAGNOSIS — G43.909 MIGRAINE, UNSPECIFIED, NOT INTRACTABLE, WITHOUT STATUS MIGRAINOSUS: ICD-10-CM

## 2023-10-10 DIAGNOSIS — Z87.19 PERSONAL HISTORY OF OTHER DISEASES OF THE DIGESTIVE SYSTEM: ICD-10-CM

## 2023-10-10 DIAGNOSIS — K21.9 GASTRO-ESOPHAGEAL REFLUX DISEASE WITHOUT ESOPHAGITIS: ICD-10-CM

## 2023-10-10 DIAGNOSIS — I10 ESSENTIAL (PRIMARY) HYPERTENSION: ICD-10-CM

## 2023-10-10 DIAGNOSIS — E78.5 HYPERLIPIDEMIA, UNSPECIFIED: ICD-10-CM

## 2023-10-10 DIAGNOSIS — Z88.1 ALLERGY STATUS TO OTHER ANTIBIOTIC AGENTS STATUS: ICD-10-CM

## 2023-10-10 LAB
ALBUMIN SERPL ELPH-MCNC: 4.4 G/DL — SIGNIFICANT CHANGE UP (ref 3.5–5.2)
ALP SERPL-CCNC: 84 U/L — SIGNIFICANT CHANGE UP (ref 30–115)
ALT FLD-CCNC: 13 U/L — SIGNIFICANT CHANGE UP (ref 0–41)
ANION GAP SERPL CALC-SCNC: 10 MMOL/L — SIGNIFICANT CHANGE UP (ref 7–14)
AST SERPL-CCNC: 23 U/L — SIGNIFICANT CHANGE UP (ref 0–41)
BASOPHILS # BLD AUTO: 0.04 K/UL — SIGNIFICANT CHANGE UP (ref 0–0.2)
BASOPHILS NFR BLD AUTO: 0.6 % — SIGNIFICANT CHANGE UP (ref 0–1)
BILIRUB SERPL-MCNC: 0.5 MG/DL — SIGNIFICANT CHANGE UP (ref 0.2–1.2)
BUN SERPL-MCNC: 14 MG/DL — SIGNIFICANT CHANGE UP (ref 10–20)
CALCIUM SERPL-MCNC: 9.6 MG/DL — SIGNIFICANT CHANGE UP (ref 8.4–10.4)
CHLORIDE SERPL-SCNC: 104 MMOL/L — SIGNIFICANT CHANGE UP (ref 98–110)
CO2 SERPL-SCNC: 28 MMOL/L — SIGNIFICANT CHANGE UP (ref 17–32)
CREAT SERPL-MCNC: 0.7 MG/DL — SIGNIFICANT CHANGE UP (ref 0.7–1.5)
EGFR: 89 ML/MIN/1.73M2 — SIGNIFICANT CHANGE UP
EOSINOPHIL # BLD AUTO: 0.21 K/UL — SIGNIFICANT CHANGE UP (ref 0–0.7)
EOSINOPHIL NFR BLD AUTO: 3 % — SIGNIFICANT CHANGE UP (ref 0–8)
GLUCOSE SERPL-MCNC: 103 MG/DL — HIGH (ref 70–99)
HCT VFR BLD CALC: 40.1 % — SIGNIFICANT CHANGE UP (ref 37–47)
HGB BLD-MCNC: 13.4 G/DL — SIGNIFICANT CHANGE UP (ref 12–16)
IMM GRANULOCYTES NFR BLD AUTO: 0.6 % — HIGH (ref 0.1–0.3)
LYMPHOCYTES # BLD AUTO: 2.15 K/UL — SIGNIFICANT CHANGE UP (ref 1.2–3.4)
LYMPHOCYTES # BLD AUTO: 30.9 % — SIGNIFICANT CHANGE UP (ref 20.5–51.1)
MCHC RBC-ENTMCNC: 30.7 PG — SIGNIFICANT CHANGE UP (ref 27–31)
MCHC RBC-ENTMCNC: 33.4 G/DL — SIGNIFICANT CHANGE UP (ref 32–37)
MCV RBC AUTO: 91.8 FL — SIGNIFICANT CHANGE UP (ref 81–99)
MONOCYTES # BLD AUTO: 0.35 K/UL — SIGNIFICANT CHANGE UP (ref 0.1–0.6)
MONOCYTES NFR BLD AUTO: 5 % — SIGNIFICANT CHANGE UP (ref 1.7–9.3)
NEUTROPHILS # BLD AUTO: 4.17 K/UL — SIGNIFICANT CHANGE UP (ref 1.4–6.5)
NEUTROPHILS NFR BLD AUTO: 59.9 % — SIGNIFICANT CHANGE UP (ref 42.2–75.2)
NRBC # BLD: 0 /100 WBCS — SIGNIFICANT CHANGE UP (ref 0–0)
PLATELET # BLD AUTO: 162 K/UL — SIGNIFICANT CHANGE UP (ref 130–400)
PMV BLD: 11.1 FL — HIGH (ref 7.4–10.4)
POTASSIUM SERPL-MCNC: 4.2 MMOL/L — SIGNIFICANT CHANGE UP (ref 3.5–5)
POTASSIUM SERPL-SCNC: 4.2 MMOL/L — SIGNIFICANT CHANGE UP (ref 3.5–5)
PROT SERPL-MCNC: 7 G/DL — SIGNIFICANT CHANGE UP (ref 6–8)
RBC # BLD: 4.37 M/UL — SIGNIFICANT CHANGE UP (ref 4.2–5.4)
RBC # FLD: 13.8 % — SIGNIFICANT CHANGE UP (ref 11.5–14.5)
SODIUM SERPL-SCNC: 142 MMOL/L — SIGNIFICANT CHANGE UP (ref 135–146)
WBC # BLD: 6.96 K/UL — SIGNIFICANT CHANGE UP (ref 4.8–10.8)
WBC # FLD AUTO: 6.96 K/UL — SIGNIFICANT CHANGE UP (ref 4.8–10.8)

## 2023-10-10 PROCEDURE — 99283 EMERGENCY DEPT VISIT LOW MDM: CPT

## 2023-10-10 PROCEDURE — 80053 COMPREHEN METABOLIC PANEL: CPT

## 2023-10-10 PROCEDURE — 99284 EMERGENCY DEPT VISIT MOD MDM: CPT

## 2023-10-10 PROCEDURE — 36415 COLL VENOUS BLD VENIPUNCTURE: CPT

## 2023-10-10 PROCEDURE — 85025 COMPLETE CBC W/AUTO DIFF WBC: CPT

## 2023-10-10 RX ORDER — SODIUM CHLORIDE 9 MG/ML
1000 INJECTION INTRAMUSCULAR; INTRAVENOUS; SUBCUTANEOUS ONCE
Refills: 0 | Status: COMPLETED | OUTPATIENT
Start: 2023-10-10 | End: 2023-10-10

## 2023-10-10 RX ADMIN — SODIUM CHLORIDE 2000 MILLILITER(S): 9 INJECTION INTRAMUSCULAR; INTRAVENOUS; SUBCUTANEOUS at 15:06

## 2023-10-10 NOTE — ED PROVIDER NOTE - PATIENT PORTAL LINK FT
You can access the FollowMyHealth Patient Portal offered by [facility] by registering at the following website: [email or web address]. By joining [employer]’s FollowMyHealth portal, you will also be able to view your health information using other applications (apps) compatible with our system.

## 2023-10-10 NOTE — ED PROVIDER NOTE - OBJECTIVE STATEMENT
78 female history hard of hearing, vertigo, anxiety, migraines, hypertension, disordered esophageal motility presenting to ED for throat discomfort.  Patient states that she has noticed white spots in the back of her throat for the last 2 to 3 years which causing to occasionally cough.  States that she is followed up with ENT who are unsure what the cause of symptoms are.  Denies fever, chills, N, V, HA, blurry vision, CP, SOB

## 2023-10-10 NOTE — ED PROVIDER NOTE - CARE PROVIDER_API CALL
Niko Fernando  Otolaryngology  [address] [place] [id number]  Phone: [phone]  Fax: [phone]  Follow Up Time:

## 2023-10-10 NOTE — ED PROVIDER NOTE - PHYSICAL EXAMINATION
VITAL SIGNS: I have reviewed nursing notes and confirm.  CONSTITUTIONAL:  in no acute distress.  SKIN: Skin exam is warm and dry, no acute rash.  HEAD: Normocephalic; atraumatic.  EYES: EOM intact; conjunctiva and sclera clear.  ENT: No nasal discharge; airway clear, no visible signs of plaques to tongue or oropharynx   NECK: Supple; non tender.  CARD: S1, S2 normal; no murmurs, gallops, or rubs. Regular rate and rhythm.  RESP: No wheezes, rales or rhonchi. Speaking in full sentences.   ABD: soft; non-distended; non-tender;

## 2023-10-10 NOTE — ED PROVIDER NOTE - CLINICAL SUMMARY MEDICAL DECISION MAKING FREE TEXT BOX
78-year-old female with history of HTN, HLD, GERD, anxiety, in ER with complaint of throat pain.  Patient has been having this pain for the past 2-3 years, following with Dr. Fernando.  States she see some white spots in the back of her throat which causes her to cough.  + Able to tolerate p.o.  Had a barium swallow in 2020.  Denies any F/C.  No abdominal pain.  Has been feeling nauseated and vomited once earlier today.  No diarrhea..  No CP/SOB.  No HA/dizziness/syncope.  PE - nad, nc/at, eomi, perrl, op - clear, mmm, no pharyngeal erythema or edema, uvula midline and normal sized, no neck supple, cta b/l, no w/r/r, rrr, abd- soft, nt/nd, nabs, from x 4, no LE swelling/tenderness, A&O x 3, cn 2-12 intact, no focal motor/sensory deficits.     -Labs unremarkable, patient well-appearing in ER.  Able to tolerate p.o. without any difficulty.  Afebrile to DC home, patient to follow-up with her ENT.  Told to return to ER for fever, worsening pain, difficulty with swallowing or breathing, or any other new/concerning symptoms.  Patient understands and agrees with plan.

## 2023-10-10 NOTE — ED ADULT NURSE NOTE - OBJECTIVE STATEMENT
pt c/o "something" stuck In back of her throat.. Pt states this has been going on for months now. Pt is A+Ox4 and ambulatory at baseline.

## 2023-10-10 NOTE — ED ADULT NURSE NOTE - NSFALLUNIVINTERV_ED_ALL_ED
Bed/Stretcher in lowest position, wheels locked, appropriate side rails in place/Call bell, personal items and telephone in reach/Instruct patient to call for assistance before getting out of bed/chair/stretcher/Non-slip footwear applied when patient is off stretcher/[place] to call system/Physically safe environment - no spills, clutter or unnecessary equipment/Purposeful proactive rounding/Room/bathroom lighting operational, light cord in reach

## 2023-10-11 ENCOUNTER — APPOINTMENT (OUTPATIENT)
Dept: OTOLARYNGOLOGY | Facility: CLINIC | Age: 78
End: 2023-10-11

## 2023-11-04 ENCOUNTER — INPATIENT (INPATIENT)
Facility: HOSPITAL | Age: 78
LOS: 2 days | Discharge: HOME HEALTH | DRG: 83 | End: 2023-11-07
Attending: SURGERY | Admitting: SURGERY
Payer: MEDICARE

## 2023-11-04 VITALS
RESPIRATION RATE: 29 BRPM | OXYGEN SATURATION: 99 % | TEMPERATURE: 99 F | HEART RATE: 77 BPM | SYSTOLIC BLOOD PRESSURE: 129 MMHG | DIASTOLIC BLOOD PRESSURE: 74 MMHG | HEIGHT: 62 IN

## 2023-11-04 DIAGNOSIS — G56.03 CARPAL TUNNEL SYNDROME, BILATERAL UPPER LIMBS: Chronic | ICD-10-CM

## 2023-11-04 DIAGNOSIS — Z90.49 ACQUIRED ABSENCE OF OTHER SPECIFIED PARTS OF DIGESTIVE TRACT: Chronic | ICD-10-CM

## 2023-11-04 DIAGNOSIS — I60.9 NONTRAUMATIC SUBARACHNOID HEMORRHAGE, UNSPECIFIED: ICD-10-CM

## 2023-11-04 DIAGNOSIS — Z98.890 OTHER SPECIFIED POSTPROCEDURAL STATES: Chronic | ICD-10-CM

## 2023-11-04 LAB
ALBUMIN SERPL ELPH-MCNC: 4.3 G/DL — SIGNIFICANT CHANGE UP (ref 3.5–5.2)
ALBUMIN SERPL ELPH-MCNC: 4.3 G/DL — SIGNIFICANT CHANGE UP (ref 3.5–5.2)
ALP SERPL-CCNC: 72 U/L — SIGNIFICANT CHANGE UP (ref 30–115)
ALP SERPL-CCNC: 72 U/L — SIGNIFICANT CHANGE UP (ref 30–115)
ALT FLD-CCNC: 18 U/L — SIGNIFICANT CHANGE UP (ref 0–41)
ALT FLD-CCNC: 18 U/L — SIGNIFICANT CHANGE UP (ref 0–41)
ANION GAP SERPL CALC-SCNC: 10 MMOL/L — SIGNIFICANT CHANGE UP (ref 7–14)
ANION GAP SERPL CALC-SCNC: 10 MMOL/L — SIGNIFICANT CHANGE UP (ref 7–14)
APTT BLD: 24.6 SEC — LOW (ref 27–39.2)
APTT BLD: 24.6 SEC — LOW (ref 27–39.2)
AST SERPL-CCNC: 43 U/L — HIGH (ref 0–41)
AST SERPL-CCNC: 43 U/L — HIGH (ref 0–41)
BASOPHILS # BLD AUTO: 0.01 K/UL — SIGNIFICANT CHANGE UP (ref 0–0.2)
BASOPHILS # BLD AUTO: 0.01 K/UL — SIGNIFICANT CHANGE UP (ref 0–0.2)
BASOPHILS NFR BLD AUTO: 0.2 % — SIGNIFICANT CHANGE UP (ref 0–1)
BASOPHILS NFR BLD AUTO: 0.2 % — SIGNIFICANT CHANGE UP (ref 0–1)
BILIRUB SERPL-MCNC: 0.4 MG/DL — SIGNIFICANT CHANGE UP (ref 0.2–1.2)
BILIRUB SERPL-MCNC: 0.4 MG/DL — SIGNIFICANT CHANGE UP (ref 0.2–1.2)
BUN SERPL-MCNC: 18 MG/DL — SIGNIFICANT CHANGE UP (ref 10–20)
BUN SERPL-MCNC: 18 MG/DL — SIGNIFICANT CHANGE UP (ref 10–20)
CALCIUM SERPL-MCNC: 9 MG/DL — SIGNIFICANT CHANGE UP (ref 8.4–10.4)
CALCIUM SERPL-MCNC: 9 MG/DL — SIGNIFICANT CHANGE UP (ref 8.4–10.4)
CHLORIDE SERPL-SCNC: 105 MMOL/L — SIGNIFICANT CHANGE UP (ref 98–110)
CHLORIDE SERPL-SCNC: 105 MMOL/L — SIGNIFICANT CHANGE UP (ref 98–110)
CO2 SERPL-SCNC: 25 MMOL/L — SIGNIFICANT CHANGE UP (ref 17–32)
CO2 SERPL-SCNC: 25 MMOL/L — SIGNIFICANT CHANGE UP (ref 17–32)
CREAT SERPL-MCNC: 0.7 MG/DL — SIGNIFICANT CHANGE UP (ref 0.7–1.5)
CREAT SERPL-MCNC: 0.7 MG/DL — SIGNIFICANT CHANGE UP (ref 0.7–1.5)
EGFR: 89 ML/MIN/1.73M2 — SIGNIFICANT CHANGE UP
EGFR: 89 ML/MIN/1.73M2 — SIGNIFICANT CHANGE UP
EOSINOPHIL # BLD AUTO: 0.03 K/UL — SIGNIFICANT CHANGE UP (ref 0–0.7)
EOSINOPHIL # BLD AUTO: 0.03 K/UL — SIGNIFICANT CHANGE UP (ref 0–0.7)
EOSINOPHIL NFR BLD AUTO: 0.5 % — SIGNIFICANT CHANGE UP (ref 0–8)
EOSINOPHIL NFR BLD AUTO: 0.5 % — SIGNIFICANT CHANGE UP (ref 0–8)
GLUCOSE SERPL-MCNC: 105 MG/DL — HIGH (ref 70–99)
GLUCOSE SERPL-MCNC: 105 MG/DL — HIGH (ref 70–99)
HCT VFR BLD CALC: 37.2 % — SIGNIFICANT CHANGE UP (ref 37–47)
HCT VFR BLD CALC: 37.2 % — SIGNIFICANT CHANGE UP (ref 37–47)
HGB BLD-MCNC: 12 G/DL — SIGNIFICANT CHANGE UP (ref 12–16)
HGB BLD-MCNC: 12 G/DL — SIGNIFICANT CHANGE UP (ref 12–16)
IMM GRANULOCYTES NFR BLD AUTO: 0.7 % — HIGH (ref 0.1–0.3)
IMM GRANULOCYTES NFR BLD AUTO: 0.7 % — HIGH (ref 0.1–0.3)
INR BLD: 1.05 RATIO — SIGNIFICANT CHANGE UP (ref 0.65–1.3)
INR BLD: 1.05 RATIO — SIGNIFICANT CHANGE UP (ref 0.65–1.3)
LYMPHOCYTES # BLD AUTO: 1.18 K/UL — LOW (ref 1.2–3.4)
LYMPHOCYTES # BLD AUTO: 1.18 K/UL — LOW (ref 1.2–3.4)
LYMPHOCYTES # BLD AUTO: 19.4 % — LOW (ref 20.5–51.1)
LYMPHOCYTES # BLD AUTO: 19.4 % — LOW (ref 20.5–51.1)
MAGNESIUM SERPL-MCNC: 2.1 MG/DL — SIGNIFICANT CHANGE UP (ref 1.8–2.4)
MAGNESIUM SERPL-MCNC: 2.1 MG/DL — SIGNIFICANT CHANGE UP (ref 1.8–2.4)
MCHC RBC-ENTMCNC: 29.9 PG — SIGNIFICANT CHANGE UP (ref 27–31)
MCHC RBC-ENTMCNC: 29.9 PG — SIGNIFICANT CHANGE UP (ref 27–31)
MCHC RBC-ENTMCNC: 32.3 G/DL — SIGNIFICANT CHANGE UP (ref 32–37)
MCHC RBC-ENTMCNC: 32.3 G/DL — SIGNIFICANT CHANGE UP (ref 32–37)
MCV RBC AUTO: 92.8 FL — SIGNIFICANT CHANGE UP (ref 81–99)
MCV RBC AUTO: 92.8 FL — SIGNIFICANT CHANGE UP (ref 81–99)
MONOCYTES # BLD AUTO: 0.38 K/UL — SIGNIFICANT CHANGE UP (ref 0.1–0.6)
MONOCYTES # BLD AUTO: 0.38 K/UL — SIGNIFICANT CHANGE UP (ref 0.1–0.6)
MONOCYTES NFR BLD AUTO: 6.2 % — SIGNIFICANT CHANGE UP (ref 1.7–9.3)
MONOCYTES NFR BLD AUTO: 6.2 % — SIGNIFICANT CHANGE UP (ref 1.7–9.3)
NEUTROPHILS # BLD AUTO: 4.45 K/UL — SIGNIFICANT CHANGE UP (ref 1.4–6.5)
NEUTROPHILS # BLD AUTO: 4.45 K/UL — SIGNIFICANT CHANGE UP (ref 1.4–6.5)
NEUTROPHILS NFR BLD AUTO: 73 % — SIGNIFICANT CHANGE UP (ref 42.2–75.2)
NEUTROPHILS NFR BLD AUTO: 73 % — SIGNIFICANT CHANGE UP (ref 42.2–75.2)
NRBC # BLD: 0 /100 WBCS — SIGNIFICANT CHANGE UP (ref 0–0)
NRBC # BLD: 0 /100 WBCS — SIGNIFICANT CHANGE UP (ref 0–0)
PLATELET # BLD AUTO: 125 K/UL — LOW (ref 130–400)
PLATELET # BLD AUTO: 125 K/UL — LOW (ref 130–400)
PMV BLD: 11.4 FL — HIGH (ref 7.4–10.4)
PMV BLD: 11.4 FL — HIGH (ref 7.4–10.4)
POTASSIUM SERPL-MCNC: 3.9 MMOL/L — SIGNIFICANT CHANGE UP (ref 3.5–5)
POTASSIUM SERPL-MCNC: 3.9 MMOL/L — SIGNIFICANT CHANGE UP (ref 3.5–5)
POTASSIUM SERPL-SCNC: 3.9 MMOL/L — SIGNIFICANT CHANGE UP (ref 3.5–5)
POTASSIUM SERPL-SCNC: 3.9 MMOL/L — SIGNIFICANT CHANGE UP (ref 3.5–5)
PROT SERPL-MCNC: 6.5 G/DL — SIGNIFICANT CHANGE UP (ref 6–8)
PROT SERPL-MCNC: 6.5 G/DL — SIGNIFICANT CHANGE UP (ref 6–8)
PROTHROM AB SERPL-ACNC: 12 SEC — SIGNIFICANT CHANGE UP (ref 9.95–12.87)
PROTHROM AB SERPL-ACNC: 12 SEC — SIGNIFICANT CHANGE UP (ref 9.95–12.87)
RBC # BLD: 4.01 M/UL — LOW (ref 4.2–5.4)
RBC # BLD: 4.01 M/UL — LOW (ref 4.2–5.4)
RBC # FLD: 14.2 % — SIGNIFICANT CHANGE UP (ref 11.5–14.5)
RBC # FLD: 14.2 % — SIGNIFICANT CHANGE UP (ref 11.5–14.5)
SODIUM SERPL-SCNC: 140 MMOL/L — SIGNIFICANT CHANGE UP (ref 135–146)
SODIUM SERPL-SCNC: 140 MMOL/L — SIGNIFICANT CHANGE UP (ref 135–146)
TROPONIN T SERPL-MCNC: <0.01 NG/ML — SIGNIFICANT CHANGE UP
TROPONIN T SERPL-MCNC: <0.01 NG/ML — SIGNIFICANT CHANGE UP
WBC # BLD: 6.09 K/UL — SIGNIFICANT CHANGE UP (ref 4.8–10.8)
WBC # BLD: 6.09 K/UL — SIGNIFICANT CHANGE UP (ref 4.8–10.8)
WBC # FLD AUTO: 6.09 K/UL — SIGNIFICANT CHANGE UP (ref 4.8–10.8)
WBC # FLD AUTO: 6.09 K/UL — SIGNIFICANT CHANGE UP (ref 4.8–10.8)

## 2023-11-04 PROCEDURE — 94640 AIRWAY INHALATION TREATMENT: CPT

## 2023-11-04 PROCEDURE — 92526 ORAL FUNCTION THERAPY: CPT | Mod: GN

## 2023-11-04 PROCEDURE — 36415 COLL VENOUS BLD VENIPUNCTURE: CPT

## 2023-11-04 PROCEDURE — 85025 COMPLETE CBC W/AUTO DIFF WBC: CPT

## 2023-11-04 PROCEDURE — 82962 GLUCOSE BLOOD TEST: CPT

## 2023-11-04 PROCEDURE — 81003 URINALYSIS AUTO W/O SCOPE: CPT

## 2023-11-04 PROCEDURE — 82553 CREATINE MB FRACTION: CPT

## 2023-11-04 PROCEDURE — 84484 ASSAY OF TROPONIN QUANT: CPT

## 2023-11-04 PROCEDURE — 82550 ASSAY OF CK (CPK): CPT

## 2023-11-04 PROCEDURE — 85027 COMPLETE CBC AUTOMATED: CPT

## 2023-11-04 PROCEDURE — 74177 CT ABD & PELVIS W/CONTRAST: CPT | Mod: 26,MA

## 2023-11-04 PROCEDURE — 83036 HEMOGLOBIN GLYCOSYLATED A1C: CPT

## 2023-11-04 PROCEDURE — 71045 X-RAY EXAM CHEST 1 VIEW: CPT | Mod: 26

## 2023-11-04 PROCEDURE — 93005 ELECTROCARDIOGRAM TRACING: CPT

## 2023-11-04 PROCEDURE — 71260 CT THORAX DX C+: CPT | Mod: 26,MA

## 2023-11-04 PROCEDURE — 70450 CT HEAD/BRAIN W/O DYE: CPT | Mod: 26,MA

## 2023-11-04 PROCEDURE — 83735 ASSAY OF MAGNESIUM: CPT

## 2023-11-04 PROCEDURE — 84100 ASSAY OF PHOSPHORUS: CPT

## 2023-11-04 PROCEDURE — 99291 CRITICAL CARE FIRST HOUR: CPT

## 2023-11-04 PROCEDURE — 72170 X-RAY EXAM OF PELVIS: CPT | Mod: 26

## 2023-11-04 PROCEDURE — 80048 BASIC METABOLIC PNL TOTAL CA: CPT

## 2023-11-04 PROCEDURE — 93306 TTE W/DOPPLER COMPLETE: CPT

## 2023-11-04 PROCEDURE — 97162 PT EVAL MOD COMPLEX 30 MIN: CPT | Mod: GP

## 2023-11-04 PROCEDURE — 72125 CT NECK SPINE W/O DYE: CPT | Mod: 26,MA

## 2023-11-04 PROCEDURE — 92610 EVALUATE SWALLOWING FUNCTION: CPT | Mod: GN

## 2023-11-04 PROCEDURE — 93010 ELECTROCARDIOGRAM REPORT: CPT | Mod: 76

## 2023-11-04 PROCEDURE — 86803 HEPATITIS C AB TEST: CPT

## 2023-11-04 RX ORDER — METOPROLOL TARTRATE 50 MG
25 TABLET ORAL DAILY
Refills: 0 | Status: DISCONTINUED | OUTPATIENT
Start: 2023-11-04 | End: 2023-11-04

## 2023-11-04 RX ORDER — LEVETIRACETAM 250 MG/1
500 TABLET, FILM COATED ORAL ONCE
Refills: 0 | Status: COMPLETED | OUTPATIENT
Start: 2023-11-04 | End: 2023-11-04

## 2023-11-04 RX ORDER — SODIUM CHLORIDE 9 MG/ML
1000 INJECTION INTRAMUSCULAR; INTRAVENOUS; SUBCUTANEOUS
Refills: 0 | Status: DISCONTINUED | OUTPATIENT
Start: 2023-11-04 | End: 2023-11-05

## 2023-11-04 RX ORDER — SERTRALINE 25 MG/1
1 TABLET, FILM COATED ORAL
Refills: 0 | DISCHARGE

## 2023-11-04 RX ORDER — ONDANSETRON 8 MG/1
4 TABLET, FILM COATED ORAL THREE TIMES A DAY
Refills: 0 | Status: DISCONTINUED | OUTPATIENT
Start: 2023-11-04 | End: 2023-11-04

## 2023-11-04 RX ORDER — METOPROLOL TARTRATE 50 MG
25 TABLET ORAL DAILY
Refills: 0 | Status: DISCONTINUED | OUTPATIENT
Start: 2023-11-04 | End: 2023-11-05

## 2023-11-04 RX ORDER — SERTRALINE 25 MG/1
150 TABLET, FILM COATED ORAL DAILY
Refills: 0 | Status: DISCONTINUED | OUTPATIENT
Start: 2023-11-04 | End: 2023-11-07

## 2023-11-04 RX ORDER — SERTRALINE 25 MG/1
150 TABLET, FILM COATED ORAL DAILY
Refills: 0 | Status: DISCONTINUED | OUTPATIENT
Start: 2023-11-04 | End: 2023-11-04

## 2023-11-04 RX ORDER — SERTRALINE 25 MG/1
0 TABLET, FILM COATED ORAL
Qty: 0 | Refills: 1 | DISCHARGE

## 2023-11-04 RX ORDER — LEVETIRACETAM 250 MG/1
500 TABLET, FILM COATED ORAL EVERY 12 HOURS
Refills: 0 | Status: DISCONTINUED | OUTPATIENT
Start: 2023-11-04 | End: 2023-11-04

## 2023-11-04 RX ORDER — QUETIAPINE FUMARATE 200 MG/1
25 TABLET, FILM COATED ORAL AT BEDTIME
Refills: 0 | Status: DISCONTINUED | OUTPATIENT
Start: 2023-11-04 | End: 2023-11-07

## 2023-11-04 RX ORDER — QUETIAPINE FUMARATE 200 MG/1
1 TABLET, FILM COATED ORAL
Refills: 0 | DISCHARGE

## 2023-11-04 RX ORDER — FAMOTIDINE 10 MG/ML
20 INJECTION INTRAVENOUS EVERY 12 HOURS
Refills: 0 | Status: DISCONTINUED | OUTPATIENT
Start: 2023-11-04 | End: 2023-11-07

## 2023-11-04 RX ORDER — SODIUM CHLORIDE 9 MG/ML
1000 INJECTION, SOLUTION INTRAVENOUS
Refills: 0 | Status: DISCONTINUED | OUTPATIENT
Start: 2023-11-04 | End: 2023-11-04

## 2023-11-04 RX ORDER — LEVETIRACETAM 250 MG/1
500 TABLET, FILM COATED ORAL EVERY 12 HOURS
Refills: 0 | Status: DISCONTINUED | OUTPATIENT
Start: 2023-11-04 | End: 2023-11-07

## 2023-11-04 RX ORDER — ACETAMINOPHEN 500 MG
650 TABLET ORAL EVERY 6 HOURS
Refills: 0 | Status: DISCONTINUED | OUTPATIENT
Start: 2023-11-04 | End: 2023-11-07

## 2023-11-04 RX ADMIN — SODIUM CHLORIDE 60 MILLILITER(S): 9 INJECTION, SOLUTION INTRAVENOUS at 20:25

## 2023-11-04 RX ADMIN — QUETIAPINE FUMARATE 25 MILLIGRAM(S): 200 TABLET, FILM COATED ORAL at 22:55

## 2023-11-04 RX ADMIN — Medication 650 MILLIGRAM(S): at 21:08

## 2023-11-04 RX ADMIN — LEVETIRACETAM 400 MILLIGRAM(S): 250 TABLET, FILM COATED ORAL at 20:02

## 2023-11-04 NOTE — ED PROVIDER NOTE - CLINICAL SUMMARY MEDICAL DECISION MAKING FREE TEXT BOX
78 yr old f that presents s/p fall/syncope. labs, ekg, imaging, on CT head, concern for SAH. neurosurgery and trauma aware.  will admit. Labs and EKG were ordered and reviewed.  Imaging was ordered and reviewed by me.  Appropriate medications for patient's presenting complaints were ordered and effects were reassessed.  Patient's records (prior hospital, ED visit, and/or nursing home notes if available) were reviewed.  Additional history was obtained from EMS, family, and/or PCP (where available).  Escalation to admission/observation was considered.   Patient requires inpatient hospitalization - SDU

## 2023-11-04 NOTE — H&P ADULT - ASSESSMENT
ASSESSMENT:  78y Female  w/ PMHx of anxiety, migraines, vertigo seen as a Trauma Consult) s/p unwitnessed fall, +ht, +loc, -ac with no complaints, no external signs of trauma.   Trauma assessment in ED: ABCs intact , GCS 15 , AAOx2,  DING.     Injuries identified:   - Trace SAH    PLAN:   #Left SAH  #Syncopal fall  -Admit to Step down unit, approved by Dr. Mueller   -Neurosurgery consulted following recommendations   No Neurosurgical Intervention  Keppra x 1 week  Neuro checks q 4hrs from our standpoint  Repeat HCT for any change in mental status  D/W Dr. Major  -Hemodynamic monitoring  -Vital signs every 4 hours  -Syncopal Workup   -Ok to regular diet   -DVT ppx : Will start tomorrow  -Re-start home meds  -PT evaluation         Disposition pending results of above labs and imaging  Above plan discussed with Trauma attending, Dr. Mueller , patient, patient family, and ED team

## 2023-11-04 NOTE — CONSULT NOTE ADULT - ATTENDING COMMENTS
Patient seen in ER 16:20.    78y Female  w/ PMHx of anxiety, migraines, vertigo seen as a Trauma Consult) s/p unwitnessed fall, +ht, +loc, -ac with no complaints, no external signs of trauma.   Trauma assessment in ED: ABCs intact , GCS 15 , AAOx2,  DING.     Injuries identified:   - Trace SAH    PLAN:   - Trauma Labs: (CBC, BMP, Coags, T&S, UA, EtOH level)  -Completion scans, (ct chest, abdomen and pelvis)  -Neurosurgery consult  -Will follow

## 2023-11-04 NOTE — CONSULT NOTE ADULT - SUBJECTIVE AND OBJECTIVE BOX
HPI:    78yF w/ PMHx of GERD, anxiety,  seen as Trauma Consult s/p unwitnessed, +ht, +loc, -ac. The patient states she woke up to use the restroom in the middle of the night, she then washed her hands and fell backwards striking her head against the wall. The patient endorses LOC, denies any pain. The patient states she fell on Saturday, but mentions it was several days ago, is A+Ox2 (unclear if this is her baseline). The patient has no external signs of trauma. Pt c/o HA. Denies dizziness or visual changes.     PAST MEDICAL & SURGICAL HISTORY:  Hypertension  Migraines      Anxiety      Vertigo      H/O gastroesophageal reflux (GERD)      Hard of hearing  uses bilateral hearing aides      Bilateral carpal tunnel syndrome  repair      S/P arthroscopy of right knee      H/O arthroscopy of left knee      History of appendectomy      H/O sinus surgery          Home Medications:  LORazepam 0.5 mg oral tablet: 1 tab(s) orally 3 times a day (09 Jun 2022 09:56)  METOPROLOL SUCC ER 25 MG TAB: TAKE 1 TABLET BY MOUTH EVERY DAY (09 Jun 2022 09:56)  SERTRALINE  MG TABLET: TAKE 1 TABLET BY MOUTH EVERY DAY (09 Jun 2022 09:56)      Allergies    chlorhexidine topical (Pruritus; Rash)    Intolerances    ROS:  [X] A ten-point review of systems is negative except as noted   [  ] Due to altered mental status/intubation, subjective information were not able to be obtained from the patient. History was obtained, to the extent possible, from review of the chart and collateral sources of information    MEDICATIONS  (STANDING):    MEDICATIONS  (PRN):      ICU Vital Signs Last 24 Hrs  T(C): 37.1 (04 Nov 2023 13:18), Max: 37.1 (04 Nov 2023 13:18)  T(F): 98.8 (04 Nov 2023 13:18), Max: 98.8 (04 Nov 2023 13:18)  HR: 77 (04 Nov 2023 13:18) (77 - 77)  BP: 129/74 (04 Nov 2023 13:18) (129/74 - 129/74)  BP(mean): --  ABP: --  ABP(mean): --  RR: 20 (04 Nov 2023 13:18) (20 - 20)  SpO2: 99% (04 Nov 2023 13:18) (99% - 99%)    O2 Parameters below as of 04 Nov 2023 13:18  Patient On (Oxygen Delivery Method): room air        I&O's Detail                         12.0   6.09  )-----------( 125      ( 04 Nov 2023 16:00 )             37.2     11-04    140  |  105  |  18  ----------------------------<  105<H>  3.9   |  25  |  0.7    Ca    9.0      04 Nov 2023 16:00  Mg     2.1     11-04    TPro  6.5  /  Alb  4.3  /  TBili  0.4  /  DBili  x   /  AST  43<H>  /  ALT  18  /  AlkPhos  72  11-04    CARDIAC MARKERS ( 04 Nov 2023 16:00 )  x     / <0.01 ng/mL / x     / x     / x          Urinalysis Basic - ( 04 Nov 2023 16:00 )    Color: x / Appearance: x / SG: x / pH: x  Gluc: 105 mg/dL / Ketone: x  / Bili: x / Urobili: x   Blood: x / Protein: x / Nitrite: x   Leuk Esterase: x / RBC: x / WBC x   Sq Epi: x / Non Sq Epi: x / Bacteria: x        On PE:    A&Ox3 with clear speech  PERRL  EOMI  No droop  tongue midline  No drift  Finger to nose intact  DING - good strength  Follows complex commands      Radiology:  < from: CT Head No Cont (11.04.23 @ 15:35) >  CT HEAD:  Trace acute subarachnoid hemorrhage within the left posterior temporal   region. No mass effect or midline shift.    CT CERVICAL SPINE:  No acute fracture or subluxation.      < end of copied text >      Assessment:  As above    Plan:  No Neurosurgical Intervention  Keppra x 1 week  Neuro checks q 4hrs from our standpoint  Repeat HCT for any change in mental status  D/W Dr. Major

## 2023-11-04 NOTE — H&P ADULT - ATTENDING COMMENTS
Patient seen in ER at 16:20.  78y Female  w/ PMHx of anxiety, migraines, vertigo seen as a Trauma Consult) s/p unwitnessed fall, +ht, +loc, -ac with no complaints, no external signs of trauma.   Trauma assessment in ED: ABCs intact , GCS 15 , AAOx2,  DING.     Injuries identified:   - Trace SAH    PLAN:   #Left SAH  #Syncopal fall  -Admit to Step down unit, approved by Dr. Mueller   -Neurosurgery consulted following recommendations   No Neurosurgical Intervention  Keppra x 1 week  Neuro checks q 4hrs from our standpoint  Repeat HCT for any change in mental status  D/W Dr. Major  -Hemodynamic monitoring  -Vital signs every 4 hours  -Syncopal Workup   -Ok to regular diet   -DVT ppx : Will start tomorrow  -Re-start home meds  -PT evaluation

## 2023-11-04 NOTE — H&P ADULT - NSHPPHYSICALEXAM_GEN_ALL_CORE
General: NAD  HEENT: Normocephalic, atraumatic, EOMI, PEERLA. no scalp lacerations   Neck: Soft, midline trachea. no c-spine tenderness  Chest: No chest wall tenderness, no subcutaneous emphysema   Cardiac:RRR  Respiratory: Bilateral breath sounds, clear and equal bilaterally  Abdomen: Soft, non-distended, non-tender, no rebound, no guarding.  Groin: Normal appearing, pelvis stable   Ext:  Moving b/l upper and lower extremities. Palpable Radial b/l UE, b/l DP palpable in LE.   Back: No T/L/S spine tenderness, No palpable runoff/stepoff/deformity

## 2023-11-04 NOTE — ED PROVIDER NOTE - PROGRESS NOTE DETAILS
Trauma, neurosurgery consults due to call from radiology notified of provider of subarachnoid hemorrhage, will see patient -CD Patient informed of subarachnoid hemorrhage,  called as well, informed, says that he will return to the emergency department in the next 1 to 2 hours -CD

## 2023-11-04 NOTE — ED ADULT NURSE NOTE - OBJECTIVE STATEMENT
Called pt due to weight gained in excess of 5lbs for the week. LVM requesting pt call back if she has any questions/concerns.  
pt was in bathroom at home, when she synopsized. Pt states she doesn't remember hitting her head, but thinks she did. Pt is A+Ox4 and ambulatory at baseline. This is her second fall in one month.

## 2023-11-04 NOTE — CONSULT NOTE ADULT - ASSESSMENT
ASSESSMENT:  78y Female  w/ PMHx of anxiety, migraines, vertigo seen as a Trauma Consult) s/p unwitnessed fall, +ht, +loc, -ac with no complaints, no external signs of trauma.   Trauma assessment in ED: ABCs intact , GCS 15 , AAOx2,  DING.     Injuries identified:   - Trace SAH    PLAN:   - Trauma Labs: (CBC, BMP, Coags, T&S, UA, EtOH level)  -Completion scans, (ct chest, abdomen and pelvis)  -Neurosurgery consult  -Will follow    Disposition pending results of above labs and imaging  Above plan discussed with Trauma attending, Dr. Mueller , patient, patient family, and ED team  --------------------------------------------------------------------------------------

## 2023-11-04 NOTE — ED PROVIDER NOTE - OBJECTIVE STATEMENT
Patient is a 78-year-old female with past medical history of hypertension, vertigo, GERD, migraines, possible dementia per family (??? -currently seeing psychiatrist for 2 to 3 months of progressive memory decline, intermittent visual and auditory hallucinations, outpatient head CT within normal limits) presenting after syncopal episode.  This morning, sometime around 11 AM patient went to the bathroom, was drying her face, the next thing she remembers she woke up in the fall.  No preceding chest pain, dizziness, but says she "felt a weird sensation beforehand."   heard pain, ran upstairs, says patient was unconscious for about 5 minutes.  Denies shaking, incontinence, tongue biting during episode.  Afterwards patient immediately returned to baseline.  Patient's endorsing mild neck pain since fall.  Denies focal numbness or weakness.  Denies headache.  Denies pain elsewhere.  Patient ambulating without assistance, without pain.  Patient otherwise well

## 2023-11-04 NOTE — CONSULT NOTE ADULT - SUBJECTIVE AND OBJECTIVE BOX
PATIENT SEEN AND EXAMINED NOTE TO FOLLOW PATIENT SEEN AND EXAMINED NOTE TO FOLLOW  BARRIE WALKER     MRN-[id number]  78y ([birth year])F      Admit Date/LOS: 11-04  Indication for SDU/SICU:  OR #1-        ============================  HPI   HPI:  MECHANISM OF INJURY:     [X] Fall	    GCS: 15 	E: 4	V: 5	M: 6    HPI:    78yF w/ PMHx of GERD, anxiety,  seen as Trauma Consult s/p unwitnessed, +ht, +loc, -ac. The patient states she woke up to use the restroom in the middle of the night, she then washed her hands and fell backwards striking her head against the wall. The patient endorses LOC, denies any pain. The patient states she fell on Saturday, but mentions it was several days ago, is A+Ox2 (unclear if this is her baseline). The patient has no external signs of trauma. Pt c/o HA. Denies dizziness or visual changes.  (04 Nov 2023 19:34)          Consults-  Consult Note Adult-Neurosurgery PA [ARMANDO Woodard] (11-04-23)  Consult Note Adult-Trauma Surgery Physician Assistant/Attend [ELLIOTT Hansen] (11-04-23)      Procedure:  OR Stats  OR Time:    EBL:   IV Fluids:  Blood Products:  UOP:      Findings-       24 Hour Events  -Admission under SICU service      [X] A ten-point review of systems was otherwise negative except as noted above.  [  ] Due to altered mental status/intubation, subjective information was not attained from the patient. History was obtained, to the extent possible, from review of the chart and collateral sources of information.    =========================================================================================================================================      PMH  PAST MEDICAL & SURGICAL HISTORY:  Hypertension      Migraines      Anxiety      Vertigo      H/O gastroesophageal reflux (GERD)      Hard of hearing  uses bilateral hearing aides      Bilateral carpal tunnel syndrome  repair      S/P arthroscopy of right knee      H/O arthroscopy of left knee      History of appendectomy      H/O sinus surgery        Home Meds:  Home Medications:  LORazepam 0.5 mg oral tablet: 1 tab(s) orally 3 times a day (09 Jun 2022 09:56)  METOPROLOL SUCC ER 25 MG TAB: TAKE 1 TABLET BY MOUTH EVERY DAY (09 Jun 2022 09:56)  QUEtiapine 25 mg oral tablet: 1 tab(s) orally once a day (in the evening) (04 Nov 2023 19:39)  sertraline 100 mg oral tablet: 1 tab(s) orally (04 Nov 2023 19:47)     Allergies  Allergies    chlorhexidine topical (Pruritus; Rash)    Intolerances       Current Medications:  acetaminophen     Tablet .. 650 milliGRAM(s) Oral every 6 hours  lactated ringers. 1000 milliLiter(s) (60 mL/Hr) IV Continuous <Continuous>  metoprolol succinate ER 25 milliGRAM(s) Oral daily  ondansetron Injectable 4 milliGRAM(s) IV Push three times a day PRN Nausea and/or Vomiting  QUEtiapine 25 milliGRAM(s) Oral at bedtime  sertraline 150 milliGRAM(s) Oral daily      VITAL SIGNS, INS/OUTS (Last 24Hours)  ICU Vital Signs Last 24 Hrs  T(C): 37.1 (04 Nov 2023 13:18), Max: 37.1 (04 Nov 2023 13:18)  T(F): 98.8 (04 Nov 2023 13:18), Max: 98.8 (04 Nov 2023 13:18)  HR: 77 (04 Nov 2023 13:18) (77 - 77)  BP: 129/74 (04 Nov 2023 13:18) (129/74 - 129/74)  BP(mean): --  ABP: --  ABP(mean): --  RR: 20 (04 Nov 2023 13:18) (20 - 20)  SpO2: 99% (04 Nov 2023 13:18) (99% - 99%)    O2 Parameters below as of 04 Nov 2023 13:18  Patient On (Oxygen Delivery Method): room air          I&O's Summary          Physical Exam:   ----------------------------------------------------------------------------------------------------------  GCS:      Exam: A&Ox3, no focal deficits    RESPIRATORY:  Normal expansion/effort  Mechanical Ventilation    CARDIOVASCULAR:   S1/S2.  RRR  No peripheral edema    GASTROINTESTINAL:  Abdomen soft, non-tender, non-distended    MUSCULOSKELETAL:  Extremities warm, pink, well-perfused.    DERM:  No skin breakdown     :   Exam: Ramirez catheter in place.     Height (cm): 157.5 (11-04-23)    Tubes/Lines/Drains   ----------------------------------------------------------------------------------------------------------  [x] Peripheral IV  [X] Urinary Catheter Ramirez                                             Date Placed:   [X] Central Venous Line          IJ/Femoral             Date Placed:    [X] Arterial Line		      Radial/Femoral    Date Placed:        BARRIE WALKER     MRN-[id number]  78y ([birth year])F      Admit Date/LOS: 11-04  Indication for SDU/SICU: hemodynamic monitoring in the setting of SAH s/p syncopal fall  ============================  HPI: 78yF w/ PMHx of GERD, gastroparesis, and anxiety s/p unwitnessed fall, +ht, +loc, -ac. The patient states she was brushing her teeth when she fell backwards striking her head against the wall. The patient endorses LOC, denies any pain. Per patient's , patient was unconscious for 2 minutes. The patient has no external signs of trauma. Pt c/o HA. Denies dizziness or visual changes. Patient was pan-scanned, with CT head revealing trace acute SAH within left posterior region. CT A&P and CT chest negative.  On examination in ED by SICU team, patient alert and oriented to person and place, but not time (A&O x 2). Per patient's daughter and , she is baseline A&O x 3 and is able to perform ADLs without assistance; per chart review patient noted to have cerebral atrophy with intermittent episodes of confusion since May.     Per neurosurgery no neurosurgical intervention warranted. Patient to be on Keppra for 1 week. Plan is for Q4 neuro checks and repeat head CT if change in mental status. Patient admitted to SICU for hemodynamic monitoring in setting of SAH s/p syncopal fall, approved by SICU attending.     Imaging    CT HEAD:  Trace acute subarachnoid hemorrhage within the left posterior temporal   region. No mass effect or midline shift.    CT CERVICAL SPINE:  No acute fracture or subluxation.    CT ABDOMEN AND PELVIS:    No current CT evidence of acute traumatic injury to chest abdomen or   pelvis.       24 Hour Events  -Admission under SICU service      [X] A ten-point review of systems was otherwise negative except as noted above.  [  ] Due to altered mental status/intubation, subjective information was not attained from the patient. History was obtained, to the extent possible, from review of the chart and collateral sources of information.    =========================================================================================================================================      PMH  PAST MEDICAL & SURGICAL HISTORY:  Hypertension      Migraines      Anxiety      Vertigo      H/O gastroesophageal reflux (GERD)      Hard of hearing  uses bilateral hearing aides      Bilateral carpal tunnel syndrome  repair      S/P arthroscopy of right knee      H/O arthroscopy of left knee      History of appendectomy      H/O sinus surgery        Home Meds:  Home Medications:  LORazepam 0.5 mg oral tablet: 1 tab(s) orally 3 times a day (09 Jun 2022 09:56)  METOPROLOL SUCC ER 25 MG TAB: TAKE 1 TABLET BY MOUTH EVERY DAY (09 Jun 2022 09:56)  QUEtiapine 25 mg oral tablet: 1 tab(s) orally once a day (in the evening) (04 Nov 2023 19:39)  sertraline 100 mg oral tablet: 1 tab(s) orally (04 Nov 2023 19:47)     Allergies  Allergies    chlorhexidine topical (Pruritus; Rash)    Intolerances       Current Medications:  acetaminophen     Tablet .. 650 milliGRAM(s) Oral every 6 hours  lactated ringers. 1000 milliLiter(s) (60 mL/Hr) IV Continuous <Continuous>  metoprolol succinate ER 25 milliGRAM(s) Oral daily  ondansetron Injectable 4 milliGRAM(s) IV Push three times a day PRN Nausea and/or Vomiting  QUEtiapine 25 milliGRAM(s) Oral at bedtime  sertraline 150 milliGRAM(s) Oral daily      VITAL SIGNS, INS/OUTS (Last 24Hours)  ICU Vital Signs Last 24 Hrs  T(C): 37.1 (04 Nov 2023 13:18), Max: 37.1 (04 Nov 2023 13:18)  T(F): 98.8 (04 Nov 2023 13:18), Max: 98.8 (04 Nov 2023 13:18)  HR: 77 (04 Nov 2023 13:18) (77 - 77)  BP: 129/74 (04 Nov 2023 13:18) (129/74 - 129/74)  BP(mean): --  ABP: --  ABP(mean): --  RR: 20 (04 Nov 2023 13:18) (20 - 20)  SpO2: 99% (04 Nov 2023 13:18) (99% - 99%)    O2 Parameters below as of 04 Nov 2023 13:18  Patient On (Oxygen Delivery Method): room air          I&O's Summary          Physical Exam:   ----------------------------------------------------------------------------------------------------------  NEURO: Alert and oriented to person and place but not time. PEERLA. Good strength b/l. No focal deficits.     RESPIRATORY: Saturating well on RA. CTAB.    CARDIOVASCULAR:   S1/S2.  RRR.     GASTROINTESTINAL:  Abdomen soft, non-tender, non-distended    MUSCULOSKELETAL:  Extremities warm, pink, well-perfused.    DERM:  No skin breakdown.      Exam: Voiding.    Height (cm): 157.5 (11-04-23)    Tubes/Lines/Drains   ----------------------------------------------------------------------------------------------------------  [x] Peripheral IV

## 2023-11-04 NOTE — ED ADULT NURSE NOTE - NSFALLRISKINTERV_ED_ALL_ED
Assistance OOB with selected safe patient handling equipment if applicable/Assistance with ambulation/Communicate fall risk and risk factors to all staff, patient, and family/Encourage patient to sit up slowly, dangle for a short time, stand at bedside before walking/Orthostatic vital signs/Provide visual cue: yellow wristband, yellow gown, etc/Reinforce activity limits and safety measures with patient and family/Review medications for side effects contributing to fall risk/Toileting schedule using arm’s reach rule for commode and bathroom/Call bell, personal items and telephone in reach/Instruct patient to call for assistance before getting out of bed/chair/stretcher/Non-slip footwear applied when patient is off stretcher/[place] to call system/Physically safe environment - no spills, clutter or unnecessary equipment/Purposeful Proactive Rounding/Room/bathroom lighting operational, light cord in reach

## 2023-11-04 NOTE — ED ADULT TRIAGE NOTE - CHIEF COMPLAINT QUOTE
BIBA from home syncopal episode while in the bathroom. pt. hit the back of her head on the sheet rock, -loc, on no blood thinners

## 2023-11-04 NOTE — ED PROVIDER NOTE - ATTENDING CONTRIBUTION TO CARE
78-year-old female with a past medical history significant for GERD anxiety, demenetia?  Who presents status post fall.  Patient states that she woke up went to the restroom and had an unwitnessed syncopal event.  Of note patient states that she had a "weird sensation" before the event, however, denies any chest pain, sob, nausea, vomiting, fevers, chills, or any other medical complaints.     VITAL SIGNS: I have reviewed nursing notes and confirm.  CONSTITUTIONAL: non-toxic, well appearing  SKIN: no rash, no petechiae.  EYES: EOMI, pink conjunctiva, anicteric  ENT: tongue midline, no exudates, MMM  NECK: Supple; no meningismus, no JVD  CARD: RRR, no murmurs, equal radial pulses bilaterally 2+  RESP: CTAB, no respiratory distress  ABD: Soft, non-tender, non-distended, no peritoneal signs, no HSM, no CVA tenderness  EXT: Normal ROM x4. No edema. No calves tenderness  NEURO: Alert, oriented. CN2-12 intact, equal strength bilaterally, nl gait.    78 yr old f that presents s/p fall/syncope. labs, ekg, imaging, on CT head, concern for SAH. neurosurgery and trauma aware. pt pending completion of pan scan. will admit.

## 2023-11-04 NOTE — CONSULT NOTE ADULT - SUBJECTIVE AND OBJECTIVE BOX
TRAUMA ACTIVATION LEVEL:   CONSULT  ACTIVATED BY:  ED**  INTUBATED:  NO**      MECHANISM OF INJURY:     [X] Fall	    GCS: 15 	E: 4	V: 5	M: 6    HPI:    78yF w/ PMHx of GERD, anxiety,  seen as Trauma Consult s/p unwitnessed, +ht, +loc, -ac. The patient states she woke up to use the restroom in the middle of the night, she then washed her hands and fell backwards striking her head against the wall. The patient endorses LOC, denies any pain. The patient states she fell on Saturday, but mentions it was several days ago, is A+Ox2 (unclear if this is her baseline). The patient has no external signs of trauma.     Trauma assessment in ED: ABCs intact , GCS 15 , AAOx3.    PAST MEDICAL & SURGICAL HISTORY:  Hypertension  Migraines  Anxiety  Vertigo  H/O gastroesophageal reflux (GERD)  Hard of hearing  uses bilateral hearing aides  Bilateral carpal tunnel syndrome  repair  S/P arthroscopy of right knee  H/O arthroscopy of left knee  History of appendectomy  H/O sinus surgery          Allergies    chlorhexidine topical (Pruritus; Rash)    Intolerances        Home Medications:  LORazepam 0.5 mg oral tablet: 1 tab(s) orally 3 times a day (09 Jun 2022 09:56)  METOPROLOL SUCC ER 25 MG TAB: TAKE 1 TABLET BY MOUTH EVERY DAY (09 Jun 2022 09:56)  SERTRALINE  MG TABLET: TAKE 1 TABLET BY MOUTH EVERY DAY (09 Jun 2022 09:56)      ROS: 10-system review is otherwise negative except HPI above.      Primary Survey:    A - airway intact  B - bilateral breath sounds and good chest rise  C - palpable pulses in all extremities  D - GCS 15 on arrival, DING  Exposure obtained    Vital Signs Last 24 Hrs  T(C): 37.1 (04 Nov 2023 13:18), Max: 37.1 (04 Nov 2023 13:18)  T(F): 98.8 (04 Nov 2023 13:18), Max: 98.8 (04 Nov 2023 13:18)  HR: 77 (04 Nov 2023 13:18) (77 - 77)  BP: 129/74 (04 Nov 2023 13:18) (129/74 - 129/74)  BP(mean): --  RR: 20 (04 Nov 2023 13:18) (20 - 20)  SpO2: 99% (04 Nov 2023 13:18) (99% - 99%)    Parameters below as of 04 Nov 2023 13:18  Patient On (Oxygen Delivery Method): room air        Secondary Survey:   General: NAD  HEENT: Normocephalic, atraumatic, EOMI, PEERLA. no scalp lacerations   Neck: Soft, midline trachea. no c-spine tenderness  Chest: No chest wall tenderness, no subcutaneous emphysema   Cardiac: S1, S2, RRR  Respiratory: Bilateral breath sounds, clear and equal bilaterally  Abdomen: Soft, non-distended, non-tender, no rebound, no guarding.  Groin: Normal appearing, pelvis stable   Ext:  Moving b/l upper and lower extremities. Palpable Radial b/l UE, b/l DP palpable in LE.   Back: No T/L/S spine tenderness, No palpable runoff/stepoff/deformity      ACCESS / DEVICES:  [ x] Peripheral IV      Labs:  CAPILLARY BLOOD GLUCOSE                   12.0   6.09  )-----------( x        ( 04 Nov 2023 16:00 )             37.2         11-04    140  |  105  |  18  ----------------------------<  105<H>  3.9   |  25  |  0.7    Calcium: 9.0 mg/dL (11-04-23 @ 16:00)    LFTs:             6.5  | 0.4  | 43       ------------------[72      ( 04 Nov 2023 16:00 )  4.3  | x    | 18          CARDIAC MARKERS ( 04 Nov 2023 16:00 )  x     / <0.01 ng/mL / x     / x     / x        Urinalysis Basic - ( 04 Nov 2023 16:00 )    Color: x / Appearance: x / SG: x / pH: x  Gluc: 105 mg/dL / Ketone: x  / Bili: x / Urobili: x   Blood: x / Protein: x / Nitrite: x   Leuk Esterase: x / RBC: x / WBC x   Sq Epi: x / Non Sq Epi: x / Bacteria: x    RADIOLOGY & ADDITIONAL STUDIES:  < from: CT Cervical Spine No Cont (11.04.23 @ 15:39) >    CT HEAD:  Trace acute subarachnoid hemorrhage within the left posterior temporal   region. No mass effect or midline shift.    CT CERVICAL SPINE:  No acute fracture or subluxation.    These findings were discussed with Dr. ANEESH RABAGO [id number] at   11/4/2023 4:03 PM by Dr. Bills with read back confirmation.    < end of copied text >  < from: CT Head No Cont (11.04.23 @ 15:35) >  CT HEAD:  Trace acute subarachnoid hemorrhage within the left posterior temporal   region. No mass effect or midline shift.    CT CERVICAL SPINE:  No acute fracture or subluxation.    These findings were discussed with Dr. ANEESH RABAGO [id number] at   11/4/2023 4:03 PM by Dr. Bills with read back confirmation.    --- End of Report ---    < end of copied text >        ---------------------------------------------------------------------------------------    11-04-23 @ 16:42

## 2023-11-04 NOTE — ED PROVIDER NOTE - PHYSICAL EXAMINATION
CONSTITUTIONAL: WDWN. NAD. Speaking in full sentences, moving all extremities.  SKIN: No lacerations or abrasions.  HEAD: NCAT  EYES: PERRLA, EOMI  ENT: TMs WNL. No hemotympanum noted.  NECK: No posterior midline cervical tenderness; +mild paraspinal cervical TTP  CARD: +S1, S2 no murmurs, gallops, or rubs. Regular rate and rhythm. Radial, DP, PT 2+/4 bilaterally  RESP: LCTAB. No wheezes, rales or rhonchi.  ABD: Abdomen soft, nontender, nondistended.  NEURO: Alert, oriented, grossly unremarkable. Strength 5/5 in bilateral UE and LEs. CN 2-12 grossly intact. Follows commands.  MSK: No TTP in UE and LEs. No chest wall tenderness or crepitus  BACK: No posterior midline tenderness  PSYCH: Cooperative, appropriate.

## 2023-11-04 NOTE — H&P ADULT - HISTORY OF PRESENT ILLNESS
MECHANISM OF INJURY:     [X] Fall	    GCS: 15 	E: 4	V: 5	M: 6    HPI:    78yF w/ PMHx of GERD, anxiety,  seen as Trauma Consult s/p unwitnessed, +ht, +loc, -ac. The patient states she woke up to use the restroom in the middle of the night, she then washed her hands and fell backwards striking her head against the wall. The patient endorses LOC, denies any pain. The patient states she fell on Saturday, but mentions it was several days ago, is A+Ox2 (unclear if this is her baseline). The patient has no external signs of trauma. Pt c/o HA. Denies dizziness or visual changes.

## 2023-11-04 NOTE — H&P ADULT - NSHPLABSRESULTS_GEN_ALL_CORE
Labs:  CAPILLARY BLOOD GLUCOSE                   12.0   6.09  )-----------( x        ( 04 Nov 2023 16:00 )             37.2         11-04    140  |  105  |  18  ----------------------------<  105<H>  3.9   |  25  |  0.7    Calcium: 9.0 mg/dL (11-04-23 @ 16:00)    LFTs:             6.5  | 0.4  | 43       ------------------[72      ( 04 Nov 2023 16:00 )  4.3  | x    | 18          CARDIAC MARKERS ( 04 Nov 2023 16:00 )  x     / <0.01 ng/mL / x     / x     / x        Urinalysis Basic - ( 04 Nov 2023 16:00 )    Color: x / Appearance: x / SG: x / pH: x  Gluc: 105 mg/dL / Ketone: x  / Bili: x / Urobili: x   Blood: x / Protein: x / Nitrite: x   Leuk Esterase: x / RBC: x / WBC x   Sq Epi: x / Non Sq Epi: x / Bacteria: x    RADIOLOGY & ADDITIONAL STUDIES:  < from: CT Cervical Spine No Cont (11.04.23 @ 15:39) >    CT HEAD:  Trace acute subarachnoid hemorrhage within the left posterior temporal   region. No mass effect or midline shift.    CT CERVICAL SPINE:  No acute fracture or subluxation.    These findings were discussed with Dr. ANEESH RABAGO [id number] at   11/4/2023 4:03 PM by Dr. Bills with read back confirmation.    < end of copied text >  < from: CT Head No Cont (11.04.23 @ 15:35) >  CT HEAD:  Trace acute subarachnoid hemorrhage within the left posterior temporal   region. No mass effect or midline shift.    CT CERVICAL SPINE:  No acute fracture or subluxation.    These findings were discussed with Dr. ANEESH RABAGO [id number] at   11/4/2023 4:03 PM by Dr. Bills with read back confirmation.    --- End of Report ---

## 2023-11-05 LAB
ANION GAP SERPL CALC-SCNC: 11 MMOL/L — SIGNIFICANT CHANGE UP (ref 7–14)
ANION GAP SERPL CALC-SCNC: 11 MMOL/L — SIGNIFICANT CHANGE UP (ref 7–14)
APPEARANCE UR: CLEAR — SIGNIFICANT CHANGE UP
APPEARANCE UR: CLEAR — SIGNIFICANT CHANGE UP
BASOPHILS # BLD AUTO: 0.02 K/UL — SIGNIFICANT CHANGE UP (ref 0–0.2)
BASOPHILS # BLD AUTO: 0.02 K/UL — SIGNIFICANT CHANGE UP (ref 0–0.2)
BASOPHILS NFR BLD AUTO: 0.3 % — SIGNIFICANT CHANGE UP (ref 0–1)
BASOPHILS NFR BLD AUTO: 0.3 % — SIGNIFICANT CHANGE UP (ref 0–1)
BILIRUB UR-MCNC: NEGATIVE — SIGNIFICANT CHANGE UP
BILIRUB UR-MCNC: NEGATIVE — SIGNIFICANT CHANGE UP
BUN SERPL-MCNC: 17 MG/DL — SIGNIFICANT CHANGE UP (ref 10–20)
BUN SERPL-MCNC: 17 MG/DL — SIGNIFICANT CHANGE UP (ref 10–20)
CALCIUM SERPL-MCNC: 8.5 MG/DL — SIGNIFICANT CHANGE UP (ref 8.4–10.5)
CALCIUM SERPL-MCNC: 8.5 MG/DL — SIGNIFICANT CHANGE UP (ref 8.4–10.5)
CHLORIDE SERPL-SCNC: 104 MMOL/L — SIGNIFICANT CHANGE UP (ref 98–110)
CHLORIDE SERPL-SCNC: 104 MMOL/L — SIGNIFICANT CHANGE UP (ref 98–110)
CK MB CFR SERPL CALC: 21 NG/ML — HIGH (ref 0.6–6.3)
CK MB CFR SERPL CALC: 21 NG/ML — HIGH (ref 0.6–6.3)
CK SERPL-CCNC: 504 U/L — HIGH (ref 0–225)
CK SERPL-CCNC: 504 U/L — HIGH (ref 0–225)
CO2 SERPL-SCNC: 22 MMOL/L — SIGNIFICANT CHANGE UP (ref 17–32)
CO2 SERPL-SCNC: 22 MMOL/L — SIGNIFICANT CHANGE UP (ref 17–32)
COLOR SPEC: YELLOW — SIGNIFICANT CHANGE UP
COLOR SPEC: YELLOW — SIGNIFICANT CHANGE UP
CREAT SERPL-MCNC: 0.6 MG/DL — LOW (ref 0.7–1.5)
CREAT SERPL-MCNC: 0.6 MG/DL — LOW (ref 0.7–1.5)
DIFF PNL FLD: NEGATIVE — SIGNIFICANT CHANGE UP
DIFF PNL FLD: NEGATIVE — SIGNIFICANT CHANGE UP
EGFR: 92 ML/MIN/1.73M2 — SIGNIFICANT CHANGE UP
EGFR: 92 ML/MIN/1.73M2 — SIGNIFICANT CHANGE UP
EOSINOPHIL # BLD AUTO: 0.08 K/UL — SIGNIFICANT CHANGE UP (ref 0–0.7)
EOSINOPHIL # BLD AUTO: 0.08 K/UL — SIGNIFICANT CHANGE UP (ref 0–0.7)
EOSINOPHIL NFR BLD AUTO: 1.3 % — SIGNIFICANT CHANGE UP (ref 0–8)
EOSINOPHIL NFR BLD AUTO: 1.3 % — SIGNIFICANT CHANGE UP (ref 0–8)
GLUCOSE SERPL-MCNC: 93 MG/DL — SIGNIFICANT CHANGE UP (ref 70–99)
GLUCOSE SERPL-MCNC: 93 MG/DL — SIGNIFICANT CHANGE UP (ref 70–99)
GLUCOSE UR QL: NEGATIVE MG/DL — SIGNIFICANT CHANGE UP
GLUCOSE UR QL: NEGATIVE MG/DL — SIGNIFICANT CHANGE UP
HCT VFR BLD CALC: 36.1 % — LOW (ref 37–47)
HCT VFR BLD CALC: 36.1 % — LOW (ref 37–47)
HGB BLD-MCNC: 11.9 G/DL — LOW (ref 12–16)
HGB BLD-MCNC: 11.9 G/DL — LOW (ref 12–16)
IMM GRANULOCYTES NFR BLD AUTO: 0.3 % — SIGNIFICANT CHANGE UP (ref 0.1–0.3)
IMM GRANULOCYTES NFR BLD AUTO: 0.3 % — SIGNIFICANT CHANGE UP (ref 0.1–0.3)
KETONES UR-MCNC: 15 MG/DL
KETONES UR-MCNC: 15 MG/DL
LEUKOCYTE ESTERASE UR-ACNC: NEGATIVE — SIGNIFICANT CHANGE UP
LEUKOCYTE ESTERASE UR-ACNC: NEGATIVE — SIGNIFICANT CHANGE UP
LYMPHOCYTES # BLD AUTO: 1.87 K/UL — SIGNIFICANT CHANGE UP (ref 1.2–3.4)
LYMPHOCYTES # BLD AUTO: 1.87 K/UL — SIGNIFICANT CHANGE UP (ref 1.2–3.4)
LYMPHOCYTES # BLD AUTO: 30.2 % — SIGNIFICANT CHANGE UP (ref 20.5–51.1)
LYMPHOCYTES # BLD AUTO: 30.2 % — SIGNIFICANT CHANGE UP (ref 20.5–51.1)
MAGNESIUM SERPL-MCNC: 2.1 MG/DL — SIGNIFICANT CHANGE UP (ref 1.8–2.4)
MAGNESIUM SERPL-MCNC: 2.1 MG/DL — SIGNIFICANT CHANGE UP (ref 1.8–2.4)
MCHC RBC-ENTMCNC: 30.7 PG — SIGNIFICANT CHANGE UP (ref 27–31)
MCHC RBC-ENTMCNC: 30.7 PG — SIGNIFICANT CHANGE UP (ref 27–31)
MCHC RBC-ENTMCNC: 33 G/DL — SIGNIFICANT CHANGE UP (ref 32–37)
MCHC RBC-ENTMCNC: 33 G/DL — SIGNIFICANT CHANGE UP (ref 32–37)
MCV RBC AUTO: 93 FL — SIGNIFICANT CHANGE UP (ref 81–99)
MCV RBC AUTO: 93 FL — SIGNIFICANT CHANGE UP (ref 81–99)
MONOCYTES # BLD AUTO: 0.33 K/UL — SIGNIFICANT CHANGE UP (ref 0.1–0.6)
MONOCYTES # BLD AUTO: 0.33 K/UL — SIGNIFICANT CHANGE UP (ref 0.1–0.6)
MONOCYTES NFR BLD AUTO: 5.3 % — SIGNIFICANT CHANGE UP (ref 1.7–9.3)
MONOCYTES NFR BLD AUTO: 5.3 % — SIGNIFICANT CHANGE UP (ref 1.7–9.3)
NEUTROPHILS # BLD AUTO: 3.88 K/UL — SIGNIFICANT CHANGE UP (ref 1.4–6.5)
NEUTROPHILS # BLD AUTO: 3.88 K/UL — SIGNIFICANT CHANGE UP (ref 1.4–6.5)
NEUTROPHILS NFR BLD AUTO: 62.6 % — SIGNIFICANT CHANGE UP (ref 42.2–75.2)
NEUTROPHILS NFR BLD AUTO: 62.6 % — SIGNIFICANT CHANGE UP (ref 42.2–75.2)
NITRITE UR-MCNC: NEGATIVE — SIGNIFICANT CHANGE UP
NITRITE UR-MCNC: NEGATIVE — SIGNIFICANT CHANGE UP
NRBC # BLD: 0 /100 WBCS — SIGNIFICANT CHANGE UP (ref 0–0)
NRBC # BLD: 0 /100 WBCS — SIGNIFICANT CHANGE UP (ref 0–0)
PH UR: 6 — SIGNIFICANT CHANGE UP (ref 5–8)
PH UR: 6 — SIGNIFICANT CHANGE UP (ref 5–8)
PHOSPHATE SERPL-MCNC: 3.5 MG/DL — SIGNIFICANT CHANGE UP (ref 2.1–4.9)
PHOSPHATE SERPL-MCNC: 3.5 MG/DL — SIGNIFICANT CHANGE UP (ref 2.1–4.9)
PLATELET # BLD AUTO: 120 K/UL — LOW (ref 130–400)
PLATELET # BLD AUTO: 120 K/UL — LOW (ref 130–400)
PMV BLD: 11.4 FL — HIGH (ref 7.4–10.4)
PMV BLD: 11.4 FL — HIGH (ref 7.4–10.4)
POTASSIUM SERPL-MCNC: 3.6 MMOL/L — SIGNIFICANT CHANGE UP (ref 3.5–5)
POTASSIUM SERPL-MCNC: 3.6 MMOL/L — SIGNIFICANT CHANGE UP (ref 3.5–5)
POTASSIUM SERPL-SCNC: 3.6 MMOL/L — SIGNIFICANT CHANGE UP (ref 3.5–5)
POTASSIUM SERPL-SCNC: 3.6 MMOL/L — SIGNIFICANT CHANGE UP (ref 3.5–5)
PROT UR-MCNC: NEGATIVE MG/DL — SIGNIFICANT CHANGE UP
PROT UR-MCNC: NEGATIVE MG/DL — SIGNIFICANT CHANGE UP
RBC # BLD: 3.88 M/UL — LOW (ref 4.2–5.4)
RBC # BLD: 3.88 M/UL — LOW (ref 4.2–5.4)
RBC # FLD: 14.2 % — SIGNIFICANT CHANGE UP (ref 11.5–14.5)
RBC # FLD: 14.2 % — SIGNIFICANT CHANGE UP (ref 11.5–14.5)
SODIUM SERPL-SCNC: 137 MMOL/L — SIGNIFICANT CHANGE UP (ref 135–146)
SODIUM SERPL-SCNC: 137 MMOL/L — SIGNIFICANT CHANGE UP (ref 135–146)
SP GR SPEC: >1.03 — HIGH (ref 1–1.03)
SP GR SPEC: >1.03 — HIGH (ref 1–1.03)
TROPONIN T SERPL-MCNC: <0.01 NG/ML — SIGNIFICANT CHANGE UP
TROPONIN T SERPL-MCNC: <0.01 NG/ML — SIGNIFICANT CHANGE UP
UROBILINOGEN FLD QL: 1 MG/DL — SIGNIFICANT CHANGE UP (ref 0.2–1)
UROBILINOGEN FLD QL: 1 MG/DL — SIGNIFICANT CHANGE UP (ref 0.2–1)
WBC # BLD: 6.2 K/UL — SIGNIFICANT CHANGE UP (ref 4.8–10.8)
WBC # BLD: 6.2 K/UL — SIGNIFICANT CHANGE UP (ref 4.8–10.8)
WBC # FLD AUTO: 6.2 K/UL — SIGNIFICANT CHANGE UP (ref 4.8–10.8)
WBC # FLD AUTO: 6.2 K/UL — SIGNIFICANT CHANGE UP (ref 4.8–10.8)

## 2023-11-05 PROCEDURE — 99232 SBSQ HOSP IP/OBS MODERATE 35: CPT

## 2023-11-05 PROCEDURE — 76937 US GUIDE VASCULAR ACCESS: CPT | Mod: 26,59

## 2023-11-05 PROCEDURE — 93010 ELECTROCARDIOGRAM REPORT: CPT

## 2023-11-05 PROCEDURE — 99233 SBSQ HOSP IP/OBS HIGH 50: CPT

## 2023-11-05 PROCEDURE — 36569 INSJ PICC 5 YR+ W/O IMAGING: CPT

## 2023-11-05 PROCEDURE — 93306 TTE W/DOPPLER COMPLETE: CPT | Mod: 26

## 2023-11-05 RX ORDER — SODIUM CHLORIDE 9 MG/ML
1000 INJECTION INTRAMUSCULAR; INTRAVENOUS; SUBCUTANEOUS
Refills: 0 | Status: DISCONTINUED | OUTPATIENT
Start: 2023-11-05 | End: 2023-11-05

## 2023-11-05 RX ORDER — POTASSIUM CHLORIDE 20 MEQ
20 PACKET (EA) ORAL
Refills: 0 | Status: COMPLETED | OUTPATIENT
Start: 2023-11-05 | End: 2023-11-05

## 2023-11-05 RX ORDER — HEPARIN SODIUM 5000 [USP'U]/ML
5000 INJECTION INTRAVENOUS; SUBCUTANEOUS EVERY 8 HOURS
Refills: 0 | Status: DISCONTINUED | OUTPATIENT
Start: 2023-11-05 | End: 2023-11-07

## 2023-11-05 RX ORDER — SENNA PLUS 8.6 MG/1
2 TABLET ORAL AT BEDTIME
Refills: 0 | Status: DISCONTINUED | OUTPATIENT
Start: 2023-11-05 | End: 2023-11-07

## 2023-11-05 RX ORDER — FLUTICASONE PROPIONATE 50 MCG
1 SPRAY, SUSPENSION NASAL EVERY 12 HOURS
Refills: 0 | Status: DISCONTINUED | OUTPATIENT
Start: 2023-11-05 | End: 2023-11-07

## 2023-11-05 RX ADMIN — SODIUM CHLORIDE 40 MILLILITER(S): 9 INJECTION INTRAMUSCULAR; INTRAVENOUS; SUBCUTANEOUS at 01:33

## 2023-11-05 RX ADMIN — Medication 650 MILLIGRAM(S): at 07:01

## 2023-11-05 RX ADMIN — QUETIAPINE FUMARATE 25 MILLIGRAM(S): 200 TABLET, FILM COATED ORAL at 21:39

## 2023-11-05 RX ADMIN — Medication 25 MILLIGRAM(S): at 07:02

## 2023-11-05 RX ADMIN — FAMOTIDINE 20 MILLIGRAM(S): 10 INJECTION INTRAVENOUS at 07:02

## 2023-11-05 RX ADMIN — Medication 650 MILLIGRAM(S): at 11:33

## 2023-11-05 RX ADMIN — SERTRALINE 150 MILLIGRAM(S): 25 TABLET, FILM COATED ORAL at 11:33

## 2023-11-05 RX ADMIN — Medication 50 MILLIEQUIVALENT(S): at 01:56

## 2023-11-05 RX ADMIN — Medication 650 MILLIGRAM(S): at 17:49

## 2023-11-05 RX ADMIN — HEPARIN SODIUM 5000 UNIT(S): 5000 INJECTION INTRAVENOUS; SUBCUTANEOUS at 21:39

## 2023-11-05 RX ADMIN — Medication 650 MILLIGRAM(S): at 17:11

## 2023-11-05 RX ADMIN — Medication 50 MILLIEQUIVALENT(S): at 04:25

## 2023-11-05 RX ADMIN — Medication 1 SPRAY(S): at 17:12

## 2023-11-05 RX ADMIN — LEVETIRACETAM 500 MILLIGRAM(S): 250 TABLET, FILM COATED ORAL at 17:11

## 2023-11-05 RX ADMIN — FAMOTIDINE 20 MILLIGRAM(S): 10 INJECTION INTRAVENOUS at 17:11

## 2023-11-05 RX ADMIN — SENNA PLUS 2 TABLET(S): 8.6 TABLET ORAL at 21:38

## 2023-11-05 RX ADMIN — Medication 650 MILLIGRAM(S): at 12:16

## 2023-11-05 RX ADMIN — Medication 1 SPRAY(S): at 01:33

## 2023-11-05 RX ADMIN — LEVETIRACETAM 500 MILLIGRAM(S): 250 TABLET, FILM COATED ORAL at 07:03

## 2023-11-05 NOTE — PROGRESS NOTE ADULT - ASSESSMENT
78y Female with hx HTN, GERD, migraines, found to have trace SAH in left posterior temporal region    NEURO:  #trace SAH in L posterior temporal region  -Q4 neurochecks   -no neurosurgical intervention  -Repeat HCT for any change in mental status  -Keppra x 1 week  #Acute pain  -APAP ATC   #hx anxiety   -c/w home quetiapine 25mg PO daily  -c/w home sertraline 150mg PO daily   #hx cerebral atrophy   -follows with Dr. Greenberg   -intermittent episodes of confusion over the past few months since May  RESP:   #Oxygenation    -saturating well on RA  -encourage IS   #hx upper airway cough syndrome  -previously on azelastine-fluticasone (as of 06/2023)  -fluticasone spray q 12 hours  #Activity    -bedrest; hypotensive when standing     CARDS:   #syncope  -EKG on admission NSR  -trop neg x 1   -orthostatics: BP 79/52 when standing  -TTE pending  #hx HTN  -c/w home metoprolol succinate 25mg PO daily     GI/NUTR:   #Diet, Clear Liquid  -passed nursing bedside swallow for clears  -SLP eval pending due to hx esophageal motility disorder  -aspiration precautions, HOB 30  #hx GERD   -c/w home pepcid 20mg BID   #hx moderate disordered esophageal motility  -found on endoscopy in 2019  -SLP eval pending  #hx gastroparesis  #Bowel regimen    -not indicated    /RENAL:   #urine output in critically ill  -NS @ 40/hr  -voiding, Q1 hour I&O    Labs:          BUN/Cr- 18/0.7  -->,  17/0.6  -->          Electrolytes-Na 137 // K 3.6 // Mg 2.1 //  Phos 3.5 (11-05 @ 00:55)  #hypokalemia-repleted      HEME/ONC:   #DVT ppx  -holding per Nsx, reassess in AM  -SCDs    Labs: Hb/Hct:  12.0/37.2  -->,  11.9/36.1  -->                      Plts:  125  -->,  120  -->                 PTT/INR:  24.6/1.05  --->         ID:  #afebrile  WBC- 6.09  --->>,  6.20  --->>  Temp trend- 24hrs T(F): 97.6 (11-04 @ 23:46), Max: 98.8 (11-04 @ 13:18)    ENDO:   #blood glucose monitoring  -last   -A1C pending  MSK:  #hx triangular fibrocartilage tear s/p L wrist arthroscopy and debridement in 2021  #activity  -PT consult placed  -bedrest  LINES/DRAINS:  PIV     78y Female with hx HTN, GERD, migraines, found to have trace SAH in left posterior temporal region    NEURO:  #trace SAH in L posterior temporal region  -Q4 neurochecks   -no neurosurgical intervention  -Repeat HCT for any change in mental status  -Keppra x 1 week  #Acute pain  -APAP ATC   #hx anxiety   -c/w home quetiapine 25mg PO daily  -c/w home sertraline 150mg PO daily   #hx cerebral atrophy   -follows with Dr. Greenberg   -intermittent episodes of confusion over the past few months since May  RESP:   #Oxygenation    -saturating well on RA  -encourage IS   #hx upper airway cough syndrome  -previously on azelastine-fluticasone (as of 06/2023)  -fluticasone spray q 12 hours  #Activity    -bedrest; hypotensive when standing     CARDS:   #syncope  -EKG on admission NSR  -trop neg x 1   -orthostatics: BP 79/52 when standing  -TTE pending  #hx HTN  -c/w home metoprolol succinate 25mg PO daily     GI/NUTR:   #Diet, Clear Liquid  -passed nursing bedside swallow for clears  -SLP eval pending due to hx esophageal motility disorder  -aspiration precautions, HOB 30  #hx GERD   -c/w home pepcid 20mg BID   #hx moderate disordered esophageal motility  -found on endoscopy in 2019  -SLP eval pending  #hx gastroparesis  #Bowel regimen    -not indicated    /RENAL:   #urine output in critically ill  -NS @ 40/hr  -voiding, Q1 hour I&O    Labs:          BUN/Cr- 18/0.7  -->,  17/0.6  -->          Electrolytes-Na 137 // K 3.6 // Mg 2.1 //  Phos 3.5 (11-05 @ 00:55)  #hypokalemia-repleted      HEME/ONC:   #DVT ppx  -holding per Nsx, reassess in AM  -SCDs    Labs: Hb/Hct:  12.0/37.2  -->,  11.9/36.1  -->                      Plts:  125  -->,  120  -->                 PTT/INR:  24.6/1.05  --->         ID:  #afebrile  WBC- 6.09  --->>,  6.20  --->>  Temp trend- 24hrs T(F): 97.6 (11-04 @ 23:46), Max: 98.8 (11-04 @ 13:18)    ENDO:   #blood glucose monitoring  -last   -A1C pending  MSK:  #hx triangular fibrocartilage tear s/p L wrist arthroscopy and debridement in 2021  #activity  -PT consult placed  -bedrest  LINES/DRAINS:  PIV  ADVANCED DIRECTIVES:  Full Code    HCP/Emergency Contact-[facility] () [phone]    INDICATION FOR SICU/SDU: Nontraumatic subarachnoid hemorrhage             DISPO:   SICU. Case discussed with attending Dr. Hernandez

## 2023-11-05 NOTE — CONSULT NOTE ADULT - CONSULT REASON
hemodynamic monitoring in the setting of SAH s/p syncopal fall
SAH s/p syncope
sah sp fall
S/P unwitnessed fall, +HT, +LOC, -AC

## 2023-11-05 NOTE — SWALLOW BEDSIDE ASSESSMENT ADULT - COMMENTS
Pt presents w/ no overt s/s of aspiration/penetration while consuming SBS & thin liquids. Patient was pan-scanned, with CT head revealing trace acute SAH within left posterior region. CT A&P and CT chest negative

## 2023-11-05 NOTE — PROGRESS NOTE ADULT - SUBJECTIVE AND OBJECTIVE BOX
BARRIE WALKER     MRN-[id number]  78y ([birth year])F      Admit Date/LOS: 11-04  Indication for SDU/SICU: hemodynamic monitoring in the setting of SAH s/p syncopal fall  ============================  HPI: 78yF w/ PMHx of GERD, gastroparesis, and anxiety s/p unwitnessed fall, +ht, +loc, -ac. The patient states she was brushing her teeth when she fell backwards striking her head against the wall. The patient endorses LOC, denies any pain. Per patient's , patient was unconscious for 2 minutes. The patient has no external signs of trauma. Pt c/o HA. Denies dizziness or visual changes. Patient was pan-scanned, with CT head revealing trace acute SAH within left posterior region. CT A&P and CT chest negative.  On examination in ED by SICU team, patient alert and oriented to person and place, but not time (A&O x 2). Per patient's daughter and , she is baseline A&O x 3 and is able to perform ADLs without assistance; per chart review patient noted to have cerebral atrophy with intermittent episodes of confusion since May.     Per neurosurgery no neurosurgical intervention warranted. Patient to be on Keppra for 1 week. Plan is for Q4 neuro checks and repeat head CT if change in mental status. Patient admitted to SICU for hemodynamic monitoring in setting of SAH s/p syncopal fall, approved by SICU attending.     Imaging    CT HEAD:  Trace acute subarachnoid hemorrhage within the left posterior temporal   region. No mass effect or midline shift.    CT CERVICAL SPINE:  No acute fracture or subluxation.    CT ABDOMEN AND PELVIS:    No current CT evidence of acute traumatic injury to chest abdomen or   pelvis.       24 Hour Events  11/04  -Admission under SICU service  -Orthostatics: SBP 79 when standing  -passed bedside swallow for clears   -hypokalemia repleted        [X] A ten-point review of systems was otherwise negative except as noted above.  [  ] Due to altered mental status/intubation, subjective information was not attained from the patient. History was obtained, to the extent possible, from review of the chart and collateral sources of information.    =============================================================================================

## 2023-11-05 NOTE — PROGRESS NOTE ADULT - SUBJECTIVE AND OBJECTIVE BOX
BARRIE WALKER     MRN-[id number]  78y ([birth year])F      Admit Date/LOS: 11-04  Indication for SDU/SICU: hemodynamic monitoring in the setting of SAH s/p syncopal fall  ============================  HPI: 78yF w/ PMHx of GERD, gastroparesis, and anxiety s/p unwitnessed fall, +ht, +loc, -ac. The patient states she was brushing her teeth when she fell backwards striking her head against the wall. The patient endorses LOC, denies any pain. Per patient's , patient was unconscious for 2 minutes. The patient has no external signs of trauma. Pt c/o HA. Denies dizziness or visual changes. Patient was pan-scanned, with CT head revealing trace acute SAH within left posterior region. CT A&P and CT chest negative.  On examination in ED by SICU team, patient alert and oriented to person and place, but not time (A&O x 2). Per patient's daughter and , she is baseline A&O x 3 and is able to perform ADLs without assistance; per chart review patient noted to have cerebral atrophy with intermittent episodes of confusion since May.     Per neurosurgery no neurosurgical intervention warranted. Patient to be on Keppra for 1 week. Plan is for Q4 neuro checks and repeat head CT if change in mental status. Patient admitted to SICU for hemodynamic monitoring in setting of SAH s/p syncopal fall, approved by SICU attending.     Imaging    CT HEAD:  Trace acute subarachnoid hemorrhage within the left posterior temporal   region. No mass effect or midline shift.    CT CERVICAL SPINE:  No acute fracture or subluxation.    CT ABDOMEN AND PELVIS:    No current CT evidence of acute traumatic injury to chest abdomen or   pelvis.       24 Hour Events  11/04  -Admission under SICU service  -Orthostatics: SBP 79 when standing  -passed bedside swallow for clears         [X] A ten-point review of systems was otherwise negative except as noted above.  [  ] Due to altered mental status/intubation, subjective information was not attained from the patient. History was obtained, to the extent possible, from review of the chart and collateral sources of information.    ======   BARRIE WALKER     MRN-[id number]  78y ([birth year])F      Admit Date/LOS: 11-04  Indication for SDU/SICU: hemodynamic monitoring in the setting of SAH s/p syncopal fall  ============================  HPI: 78yF w/ PMHx of GERD, gastroparesis, and anxiety s/p unwitnessed fall, +ht, +loc, -ac. The patient states she was brushing her teeth when she fell backwards striking her head against the wall. The patient endorses LOC, denies any pain. Per patient's , patient was unconscious for 2 minutes. The patient has no external signs of trauma. Pt c/o HA. Denies dizziness or visual changes. Patient was pan-scanned, with CT head revealing trace acute SAH within left posterior region. CT A&P and CT chest negative.  On examination in ED by SICU team, patient alert and oriented to person and place, but not time (A&O x 2). Per patient's daughter and , she is baseline A&O x 3 and is able to perform ADLs without assistance; per chart review patient noted to have cerebral atrophy with intermittent episodes of confusion since May.     Per neurosurgery no neurosurgical intervention warranted. Patient to be on Keppra for 1 week. Plan is for Q4 neuro checks and repeat head CT if change in mental status. Patient admitted to SICU for hemodynamic monitoring in setting of SAH s/p syncopal fall, approved by SICU attending.     Imaging    CT HEAD:  Trace acute subarachnoid hemorrhage within the left posterior temporal   region. No mass effect or midline shift.    CT CERVICAL SPINE:  No acute fracture or subluxation.    CT ABDOMEN AND PELVIS:    No current CT evidence of acute traumatic injury to chest abdomen or   pelvis.       24 Hour Events  11/04  -Admission under SICU service  -Orthostatics: SBP 79 when standing  -passed bedside swallow for clears         [X] A ten-point review of systems was otherwise negative except as noted above.  [  ] Due to altered mental status/intubation, subjective information was not attained from the patient. History was obtained, to the extent possible, from review of the chart and collateral sources of information.    ======    Daily Height in cm: 157.48 (04 Nov 2023 13:18)    Daily     Diet, Clear Liquid (11-04-23 @ 20:58)      CURRENT MEDS:  Neurologic Medications  acetaminophen     Tablet .. 650 milliGRAM(s) Oral every 6 hours  levETIRAcetam 500 milliGRAM(s) Oral every 12 hours  QUEtiapine 25 milliGRAM(s) Oral at bedtime  sertraline 150 milliGRAM(s) Oral daily    Respiratory Medications    Cardiovascular Medications  metoprolol succinate ER 25 milliGRAM(s) Oral daily    Gastrointestinal Medications  famotidine    Tablet 20 milliGRAM(s) Oral every 12 hours  senna 2 Tablet(s) Oral at bedtime  sodium chloride 0.9%. 1000 milliLiter(s) IV Continuous <Continuous>    Genitourinary Medications    Hematologic/Oncologic Medications    Antimicrobial/Immunologic Medications    Endocrine/Metabolic Medications    Topical/Other Medications  fluticasone propionate 50 MICROgram(s)/spray Nasal Spray 1 Spray(s) Both Nostrils every 12 hours      ICU Vital Signs Last 24 Hrs  T(C): 36.4 (04 Nov 2023 23:46), Max: 37.1 (04 Nov 2023 13:18)  T(F): 97.6 (04 Nov 2023 23:46), Max: 98.8 (04 Nov 2023 13:18)  HR: 59 (05 Nov 2023 06:00) (59 - 80)  BP: 137/66 (05 Nov 2023 06:00) (95/57 - 139/76)  BP(mean): 94 (05 Nov 2023 06:00) (71 - 97)  ABP: --  ABP(mean): --  RR: 22 (05 Nov 2023 06:00) (18 - 22)  SpO2: 98% (05 Nov 2023 06:00) (94% - 99%)    O2 Parameters below as of 05 Nov 2023 06:00  Patient On (Oxygen Delivery Method): room air            Adult Advanced Hemodynamics Last 24 Hrs  CVP(mm Hg): --  CVP(cm H2O): --  CO: --  CI: --  PA: --  PA(mean): --  PCWP: --  SVR: --  SVRI: --  PVR: --  PVRI: --          I&O's Summary    04 Nov 2023 08:01  -  05 Nov 2023 07:00  --------------------------------------------------------  IN: 500 mL / OUT: 550 mL / NET: -50 mL      I&O's Detail    04 Nov 2023 08:01  -  05 Nov 2023 07:00  --------------------------------------------------------  IN:    IV PiggyBack: 200 mL    sodium chloride 0.9%: 60 mL    sodium chloride 0.9%: 240 mL  Total IN: 500 mL    OUT:    Intermittent Catheterization - Urethral (mL): 550 mL    Voided (mL): 0 mL  Total OUT: 550 mL    Total NET: -50 mL          PHYSICAL EXAM:    General/Neuro  GCS:  14   = E 4  / V 4  / M 6     Deficits:  alert and oriented to person, place, event (not to time, which is patient's baseline on this admission according to chart review), strength 5/5 bilaterally in upper and lower extremities, good  strength bilaterally, cranial nerves II-XII intact, no facial asymmetry/ no facial droop/ no slurring of speech noted.  Pupils: PERRLA bilaterally, 2mm and briskly reactive to light bilaterally.    Lungs:      clear to auscultation, Normal expansion/effort.     Cardiovascular : S1, S2.  Regular rate and rhythm.   Cardiac Rhythm: Normal Sinus Rhythm    GI: Abdomen soft, Non-tender, Non-distended.      Extremities: Extremities warm, pink, well-perfused. Pulses:Rt     Lt    Derm: Good skin turgor, no skin breakdown.      :       Ramirez catheter in place.      CXR:     LABS:  CAPILLARY BLOOD GLUCOSE                              11.9   6.20  )-----------( 120      ( 05 Nov 2023 00:55 )             36.1       11-05    137  |  104  |  17  ----------------------------<  93  3.6   |  22  |  0.6<L>    Ca    8.5      05 Nov 2023 00:55  Phos  3.5     11-05  Mg     2.1     11-05    TPro  6.5  /  Alb  4.3  /  TBili  0.4  /  DBili  x   /  AST  43<H>  /  ALT  18  /  AlkPhos  72  11-04      PT/INR - ( 04 Nov 2023 17:02 )   PT: 12.00 sec;   INR: 1.05 ratio         PTT - ( 04 Nov 2023 17:02 )  PTT:24.6 sec  CARDIAC MARKERS ( 05 Nov 2023 00:55 )  x     / <0.01 ng/mL / 504 U/L / x     / 21.0 ng/mL  CARDIAC MARKERS ( 04 Nov 2023 16:00 )  x     / <0.01 ng/mL / x     / x     / x          Urinalysis Basic - ( 05 Nov 2023 00:55 )    Color: x / Appearance: x / SG: x / pH: x  Gluc: 93 mg/dL / Ketone: x  / Bili: x / Urobili: x   Blood: x / Protein: x / Nitrite: x   Leuk Esterase: x / RBC: x / WBC x   Sq Epi: x / Non Sq Epi: x / Bacteria: x

## 2023-11-05 NOTE — CONSULT NOTE ADULT - ASSESSMENT
IMPRESSION: Rehab of 78  y.o  f  ptn  rehab  for SDh sp  fall     PRECAUTIONS: [  ] Cardiac  [  ] Respiratory  [  ] Seizures [  ] Contact Isolation  [  ] Droplet Isolation  [ FALL ] Other    Weight Bearing Status:     RECOMMENDATION:    Out of Bed to Chair     DVT/Decubiti Prophylaxis    REHAB PLAN:     [ x  ] Bedside P/T 3-5 times a week   [   ]   Bedside O/T  2-3 times a week             [   ] No Rehab Therapy Indicated                   [   ]  Speech Therapy   Conditioning/ROM                                    ADL  Bed Mobility                                               Conditioning/ROM  Transfers                                                     Bed Mobility  Sitting /Standing Balance                         Transfers                                        Gait Training                                               Sitting/Standing Balance  Stair Training [   ]Applicable                    Home equipment Eval                                                                        Splinting  [   ] Only      GOALS:   ADL   [  x ]   Independent                    Transfers  [  x ] Independent                          Ambulation  [ x  ] Independent     [    ] With device                            [   x]  CG                                                         [  x ]  CG                                                                  [x   ] CG                            [    ] Min A                                                   [   ] Min A                                                              [   ] Min  A          DISCHARGE PLAN:   [   ]  Good candidate for Intensive Rehabilitation/Hospital based-[facility]A [facility]                                             Will tolerate 3hrs Intensive Rehab Daily                                       [  xx  ]  Short Term Rehab in Skilled Nursing Facility d/w  ptn and  family  both  agree  with  rehab  plan cont [facility]                                       [    ]  Home with Outpatient or VN services                                         [    ]  Possible Candidate for Intensive Hospital based Rehab

## 2023-11-05 NOTE — PROGRESS NOTE ADULT - ASSESSMENT
78y Female with hx HTN, GERD, migraines, found to have trace SAH in left posterior temporal region    NEURO:  #trace SAH in L posterior temporal region  -Q4 neurochecks   -no neurosurgical intervention  -Repeat HCT for any change in mental status  -Keppra x 1 week  #Acute pain  -APAP ATC   #hx anxiety   -c/w home quetiapine 25mg PO daily  -c/w home sertraline 150mg PO daily   #hx cerebral atrophy   -follows with Dr. Greenberg   -intermittent episodes of confusion over the past few months since May  RESP:   #Oxygenation    -saturating well on RA  -encourage IS   #hx upper airway cough syndrome  -previously on azelastine-fluticasone (as of 06/2023)  -fluticasone spray q 12 hours  #Activity    -bedrest; hypotensive when standing     CARDS:   #syncope  -EKG on admission NSR  -trop neg x 1   -orthostatics: BP 79/52 when standing  -TTE pending  #hx HTN  -c/w home metoprolol succinate 25mg PO daily     GI/NUTR:   #Diet, Clear Liquid  -passed nursing bedside swallow for clears  -SLP eval pending due to hx esophageal motility disorder  -aspiration precautions, HOB 30  #hx GERD   -c/w home pepcid 20mg BID   #hx moderate disordered esophageal motility  -found on endoscopy in 2019  -SLP eval pending  #hx gastroparesis  #Bowel regimen    -not indicated    /RENAL:   #urine output in critically ill  -NS @ 40/hr  -voiding, Q1 hour I&O    Labs:          BUN/Cr- 18/0.7  -->          Electrolytes-Na 140 // K 3.9 // Mg 2.1 //  Phos -- (11-04 @ 16:00       HEME/ONC:   #DVT ppx  -holding per Nsx, reassess in AM  #  Labs: Hb/Hct:  12.0/37.2  (11-04 @ 16:00)  -->                      Plts:  125  -->                 PTT/INR:  24.6/1.05  --->         ID:  #afebrile  WBC- 6.09  --->>  Temp trend- 24hrs T(F): 98.8 (11-04 @ 13:18), Max: 98.8 (11-04 @ 13:18)      ENDO:   #blood glucose monitoring  -last   -A1C pending  MSK:  #hx triangular fibrocartilage tear s/p L wrist arthroscopy and debridement in 2021  #activity  -PT consult placed  -bedrest  LINES/DRAINS:  PIV     78y Female with hx HTN, GERD, migraines, found to have trace SAH in left posterior temporal region    NEURO:  #trace SAH in L posterior temporal region  -Q4 neurochecks   -no neurosurgical intervention  -Repeat HCT for any change in mental status  -Keppra x 1 week  #Acute pain  -APAP ATC   #hx anxiety   -c/w home quetiapine 25mg PO daily  -c/w home sertraline 150mg PO daily   #hx cerebral atrophy   -follows with Dr. Greenberg   -intermittent episodes of confusion over the past few months since May  RESP:   #Oxygenation    -saturating well on RA  -encourage IS   #hx upper airway cough syndrome  -previously on azelastine-fluticasone (as of 06/2023)  -fluticasone spray q 12 hours  #Activity    -bedrest; hypotensive when standing     CARDS:   #syncope  -EKG on admission NSR  -trop neg x 1   -orthostatics: BP 79/52 when standing  -TTE pending  #hx HTN  -c/w home metoprolol succinate 25mg PO daily     GI/NUTR:   #Diet, Clear Liquid  -passed nursing bedside swallow for clears  -SLP eval pending due to hx esophageal motility disorder  -aspiration precautions, HOB 30  #hx GERD   -c/w home pepcid 20mg BID   #hx moderate disordered esophageal motility  -found on endoscopy in 2019  -SLP eval pending  #hx gastroparesis  #Bowel regimen    -not indicated    /RENAL:   #urine output in critically ill  -NS @ 40/hr  -voiding, Q1 hour I&O  -550cc on straight cath, f/u next TOV 11AM    Labs:          BUN/Cr- 18/0.7  -->          Electrolytes-Na 140 // K 3.9 // Mg 2.1 //  Phos -- (11-04 @ 16:00       HEME/ONC:   #DVT ppx  -holding per Nsx, reassess in AM  #  Labs: Hb/Hct:  12.0/37.2  (11-04 @ 16:00)  -->                      Plts:  125  -->                 PTT/INR:  24.6/1.05  --->         ID:  #afebrile  WBC- 6.09  --->>  Temp trend- 24hrs T(F): 98.8 (11-04 @ 13:18), Max: 98.8 (11-04 @ 13:18)      ENDO:   #blood glucose monitoring  -last   -A1C pending  MSK:  #hx triangular fibrocartilage tear s/p L wrist arthroscopy and debridement in 2021  #activity  -PT consult placed  -bedrest  LINES/DRAINS:  PIV   Assessment and Plan:  78y Female with hx HTN, GERD, migraines, found to have trace SAH in left posterior temporal region    NEURO:  #trace SAH in L posterior temporal region  -Q4 neurochecks   -no neurosurgical intervention  -Repeat HCT for any change in mental status  -Keppra x 1 week  #Acute pain  -APAP ATC   #hx anxiety   -c/w home quetiapine 25mg PO daily  -c/w home sertraline 150mg PO daily   #hx cerebral atrophy   -follows with Dr. Greenberg   -intermittent episodes of confusion over the past few months since May  RESP:   #Oxygenation    -saturating well on RA  -encourage IS   #hx upper airway cough syndrome  -previously on azelastine-fluticasone (as of 06/2023)  -fluticasone spray q 12 hours  #Activity    -bedrest; hypotensive when standing     CARDS:   #syncope  -EKG on admission NSR  -trop neg x 1   -orthostatics: BP 79/52 when standing  -TTE pending  #hx HTN  -c/w home metoprolol succinate 25mg PO daily     GI/NUTR:   #Diet, Clear Liquid  -passed nursing bedside swallow for clears  -SLP eval pending due to hx esophageal motility disorder  -aspiration precautions, HOB 30  #hx GERD   -c/w home pepcid 20mg BID   #hx moderate disordered esophageal motility  -found on endoscopy in 2019  -SLP eval pending  #hx gastroparesis  #Bowel regimen    -not indicated    /RENAL:   #urine output in critically ill  -NS @ 40/hr  -voiding, Q1 hour I&O  -550cc on straight cath, f/u next TOV 11AM    Labs:          BUN/Cr- 18/0.7  -->          Electrolytes-Na 140 // K 3.9 // Mg 2.1 //  Phos -- (11-04 @ 16:00       HEME/ONC:   #DVT ppx  -holding per neurosurgery (neurosurgery team will f/u with neurosurgery attending)  #  Labs: Hb/Hct:  12.0/37.2  (11-04 @ 16:00)  -->                      Plts:  125  -->                 PTT/INR:  24.6/1.05  --->         ID:  #afebrile  WBC- 6.09  --->>  Temp trend- 24hrs T(F): 98.8 (11-04 @ 13:18), Max: 98.8 (11-04 @ 13:18)      ENDO:   #blood glucose monitoring  -last   -A1C pending  MSK:  #hx triangular fibrocartilage tear s/p L wrist arthroscopy and debridement in 2021  #activity  -PT consult placed  -bedrest  LINES/DRAINS:  PIV

## 2023-11-05 NOTE — CONSULT NOTE ADULT - SUBJECTIVE AND OBJECTIVE BOX
HPI:77 yo F w/ PMHx of GERD, anxiety,  seen as Trauma Consult s/p unwitnessed, fall  +ht, +loc, -ac. The patient states she woke up to use the restroom in the middle of the night, she then washed her hands and fell backwards striking her head against the wall. The patient endorses LOC, denies any pain. The patient states she fell on Saturday, but mentions it was several days ago, is A+Ox2 (unclear if this is her baseline). The patient has no external signs of trauma. Pt c/o HA. Denies dizziness or visual changes.  ptn referred  to acute  rehab  for eval and  tx  ptn seen and exam  at bed side  nad  ptn  family  are  at  bed  side  visiting  son  providing  hx  ptn  aox3 fu  command  ptn labs  ,  imaging  and  medical  notes  are  appreciated  and  reviewed       PTN  REFERRED TO ACUTE  REHAB  FOR  EVAL AND  TX   PAST MEDICAL & SURGICAL HISTORY:  Hypertension      Migraines      Anxiety      Vertigo      H/O gastroesophageal reflux (GERD)      Hard of hearing  uses bilateral hearing aides      Bilateral carpal tunnel syndrome  repair      S/P arthroscopy of right knee      H/O arthroscopy of left knee      History of appendectomy      H/O sinus surgery          Hospital Course:    TODAY'S SUBJECTIVE & REVIEW OF SYMPTOMS:     Constitutional WNL   Cardio WNL   Resp WNL   GI WNL  Heme WNL  Endo WNL  Skin WNL  MSK WNL  Neuro WNL  Cognitive WNL  Psych WNL      MEDICATIONS  (STANDING):  acetaminophen     Tablet .. 650 milliGRAM(s) Oral every 6 hours  famotidine    Tablet 20 milliGRAM(s) Oral every 12 hours  fluticasone propionate 50 MICROgram(s)/spray Nasal Spray 1 Spray(s) Both Nostrils every 12 hours  levETIRAcetam 500 milliGRAM(s) Oral every 12 hours  QUEtiapine 25 milliGRAM(s) Oral at bedtime  senna 2 Tablet(s) Oral at bedtime  sertraline 150 milliGRAM(s) Oral daily  sodium chloride 0.9%. 1000 milliLiter(s) (40 mL/Hr) IV Continuous <Continuous>    MEDICATIONS  (PRN):      FAMILY HISTORY:  FH: diabetes mellitus (Mother)    FH: prostate cancer (Father)        Allergies    chlorhexidine topical (Pruritus; Rash)    Intolerances        SOCIAL HISTORY:    [  ] Etoh  [  ] Smoking  [  ] Substance abuse     Home Environment:  [  ] Home Alone  [ x ] Lives with Family  [  ] Home Health Aid    Dwelling:  [  ] Apartment  [ x] Private House  [  ] Adult Home  [  ] Skilled Nursing Facility      [  ] Short Term  [  ] Long Term  [x  ] Stairs       Elevator [  ]    FUNCTIONAL STATUS PTA: (Check all that apply)  Ambulation: [   x]Independent    [  ] Dependent     [  ] Non-Ambulatory  Assistive Device: [  ] SA Cane  [  ]  Q Cane  [  ] Walker  [  ]  Wheelchair  ADL : [ x ] Independent  [  ]  Dependent       Vital Signs Last 24 Hrs  T(C): 36.1 (05 Nov 2023 12:01), Max: 37.1 (04 Nov 2023 13:18)  T(F): 97 (05 Nov 2023 12:01), Max: 98.8 (04 Nov 2023 13:18)  HR: 61 (05 Nov 2023 12:01) (59 - 80)  BP: 132/75 (05 Nov 2023 12:01) (95/57 - 139/76)  BP(mean): 98 (05 Nov 2023 12:01) (71 - 98)  RR: 20 (05 Nov 2023 12:00) (18 - 26)  SpO2: 95% (05 Nov 2023 12:01) (94% - 99%)    Parameters below as of 05 Nov 2023 12:00  Patient On (Oxygen Delivery Method): room air          PHYSICAL EXAM: Alert & Oriented X3  GENERAL: NAD, well-groomed, well-developed  HEAD:  Atraumatic, Normocephalic  EYES: EOMI, PERRLA, conjunctiva and sclera clear  NECK: Supple, No JVD, Normal thyroid  CHEST/LUNG: Clear to percussion bilaterally; No rales, rhonchi, wheezing, or rubs  HEART: Regular rate and rhythm; No murmurs, rubs, or gallops  ABDOMEN: Soft, Nontender, Nondistended; Bowel sounds present  EXTREMITIES:  2+ Peripheral Pulses, No clubbing, cyanosis, or edema    NERVOUS SYSTEM:  Cranial Nerves 2-12 intact [x  ] Abnormal  [  ]  ROM: WFL all extremities [x  ]  Abnormal [  ]  Motor Strength: WFL all extremities  [  x]  Abnormal [  ]  Sensation: intact to light touch [ x ] Abnormal [  ]  Reflexes: Symmetric [x  ]  Abnormal [  ]    FUNCTIONAL STATUS:  Bed Mobility: Independent [  ]  Supervision [  ]  Needs Assistance [ x ]  N/A [  ]  Transfers: Independent [  ]  Supervision [  ]  Needs Assistance [  ]  N/A [ x ]   Ambulation: Independent [  ]  Supervision [  ]  Needs Assistance [  ]  N/A [x  ]  ADL: Independent [  ] Requires Assistance [  ] N/A [x  ]  SEE PT/OT IE NOTES    LABS:                        11.9   6.20  )-----------( 120      ( 05 Nov 2023 00:55 )             36.1     11-05    137  |  104  |  17  ----------------------------<  93  3.6   |  22  |  0.6<L>    Ca    8.5      05 Nov 2023 00:55  Phos  3.5     11-05  Mg     2.1     11-05    TPro  6.5  /  Alb  4.3  /  TBili  0.4  /  DBili  x   /  AST  43<H>  /  ALT  18  /  AlkPhos  72  11-04    PT/INR - ( 04 Nov 2023 17:02 )   PT: 12.00 sec;   INR: 1.05 ratio         PTT - ( 04 Nov 2023 17:02 )  PTT:24.6 sec  Urinalysis Basic - ( 05 Nov 2023 00:55 )    Color: x / Appearance: x / SG: x / pH: x  Gluc: 93 mg/dL / Ketone: x  / Bili: x / Urobili: x   Blood: x / Protein: x / Nitrite: x   Leuk Esterase: x / RBC: x / WBC x   Sq Epi: x / Non Sq Epi: x / Bacteria: x        RADIOLOGY & ADDITIONAL STUDIES:< from: CT Head No Cont (11.04.23 @ 15:35) >  IMPRESSION:    CT HEAD:  Trace acute subarachnoid hemorrhage within the left posterior temporal   region. No mass effect or midline shift.    CT CERVICAL SPINE:  No acute fracture or subluxation.      < end of copied text >      Assesment:

## 2023-11-05 NOTE — PROGRESS NOTE ADULT - SUBJECTIVE AND OBJECTIVE BOX
TRAUMA SURGERY PROGRESS NOTE    Patient: BARRIE WALKER , 78y ([birth year])Female   MRN: [id number]  Location: [address]  Visit: 11-04-23 Inpatient  Date: 11-05-23 @ 05:19    Hospital Day #: 1d    DIAGNOSIS/PROCEDURES:   - Trace SAH     INTERVAL HX:   No acute events overnight. Tolerating CLD.   Potassium repleted as per protocol.     VITALS:   T(F): 97.6 (11-04-23 @ 23:46), Max: 98.8 (11-04-23 @ 13:18)  HR: 60 (11-05-23 @ 04:00)  BP: 95/57 (11-05-23 @ 04:00)  RR: 21 (11-05-23 @ 04:00)  SpO2: 96% (11-05-23 @ 04:00)    DIET:   Diet, Clear Liquid    FLUIDS:   sodium chloride 0.9%.: Solution, 1000 milliLiter(s) infuse at 40 mL/Hr      11-04 @ 08:01  -  11-05 @ 05:19  --------------------------------------------------------  OUT:    Voided (mL): 0 mL  Total OUT: 0 mL    PHYSICAL EXAM:  General Appearance: NAD  HEENT: EOMI, sclera non-icteric.  Heart: RRR   Lungs: Breathing comfortably  Abdomen:  Soft, nontender, nondistended.   MSK/Extremities: Warm & well-perfused.   Skin: Warm, dry. No jaundice.     LABS:                        11.9   6.20  )-----------( 120       11-05 @ 00:55             36.1     137  |  104  |  17  ----------------------------<  93        11-05 @ 00:55  3.6   |  22  |  0.6      Coagulation Studies - 11-04 @ 17:02  PT: 12.00 sec  PTT: 24.6 sec<L>  INR: 1.05 ratio    Calcium: 8.5 mg/dL (11-05 @ 00:55)  Magnesium: 2.1 mg/dL (11-05 @ 00:55)  Phosphorus: 3.5 mg/dL (11-05 @ 00:55)    Creatine Kinase, Serum: 504 U/L *H* (11-05 @ 00:55)    LIVER FUNCTIONS - ( 04 Nov 2023 16:00 )  Alb: 4.3 g/dL / Pro: 6.5 g/dL / ALK PHOS: 72 U/L / ALT: 18 U/L / AST: 43 U/L / GGT: x           CARDIAC MARKERS ( 05 Nov 2023 00:55 )  x     / <0.01 ng/mL / 504 U/L / x     / 21.0 ng/mL  CARDIAC MARKERS ( 04 Nov 2023 16:00 )  x     / <0.01 ng/mL / x     / x     / x        RADIOLOGY & OTHER STUDIES:   # CT Cervical Spine No Cont (11.04.23 @ 15:39) >  IMPRESSION:    CT HEAD:  Trace acute subarachnoid hemorrhage within the left posterior temporal   region. No mass effect or midline shift.    CT CERVICAL SPINE:  No acute fracture or subluxation.    These findings were discussed with Dr. ANEESH RABAGO [id number] at   11/4/2023 4:03 PM by Dr. Bills with read back confirmation.    --- End of Report ---      ACCESS DEVICES:  [X] Peripheral IV  [ ] Central Venous Line	[ ] R	[ ] L	[ ] IJ	[ ] Fem	[ ] SC	Placed:   [ ] Arterial Line		[ ] R	[ ] L	[ ] Fem	[ ] Rad	[ ] Ax	Placed:   [ ] PICC:					[ ] Mediport  [ ] Urinary Catheter,  Date Placed:   [ ] Chest tube: [ ] Right, [ ] Left  [ ] REGINA/Reinaldo Drains

## 2023-11-05 NOTE — CHART NOTE - NSCHARTNOTEFT_GEN_A_CORE
SICU Transfer Note:    Transfer from: SICU  Transfer to:  ( X ) Surgery    (  ) Telemetry    (  ) Medicine    (  ) Palliative    (  ) Stroke Unit    (  ) _______________      SICU COURSE:  HPI: 78yF w/ PMHx of GERD, gastroparesis, and anxiety s/p unwitnessed fall, +ht, +loc, -ac. The patient states she was brushing her teeth when she fell backwards striking her head against the wall. The patient endorses LOC, denies any pain. Per patient's , patient was unconscious for 2 minutes. The patient has no external signs of trauma. Pt c/o HA. Denies dizziness or visual changes. Patient was pan-scanned, with CT head revealing trace acute SAH within left posterior region. CT A&P and CT chest negative.  On examination in ED by SICU team, patient alert and oriented to person and place, but not time (A&O x 2). Per patient's daughter and , she is baseline A&O x 3 and is able to perform ADLs without assistance; per chart review patient noted to have cerebral atrophy with intermittent episodes of confusion since May.     Per neurosurgery no neurosurgical intervention warranted. Patient to be on Keppra for 1 week. Plan is for Q4 neuro checks and repeat head CT if change in mental status.    Patient was re-revaluated on AM rounds on 11/5 and determined to be optimized for downgrade to SDU.        PAST MEDICAL & SURGICAL HISTORY:  Hypertension      Migraines      Anxiety      Vertigo      H/O gastroesophageal reflux (GERD)      Hard of hearing  uses bilateral hearing aides      Bilateral carpal tunnel syndrome  repair      S/P arthroscopy of right knee      H/O arthroscopy of left knee      History of appendectomy      H/O sinus surgery        Allergies    chlorhexidine topical (Pruritus; Rash)    Intolerances      MEDICATIONS  (STANDING):  acetaminophen     Tablet .. 650 milliGRAM(s) Oral every 6 hours  famotidine    Tablet 20 milliGRAM(s) Oral every 12 hours  fluticasone propionate 50 MICROgram(s)/spray Nasal Spray 1 Spray(s) Both Nostrils every 12 hours  levETIRAcetam 500 milliGRAM(s) Oral every 12 hours  QUEtiapine 25 milliGRAM(s) Oral at bedtime  senna 2 Tablet(s) Oral at bedtime  sertraline 150 milliGRAM(s) Oral daily  sodium chloride 0.9%. 1000 milliLiter(s) (40 mL/Hr) IV Continuous <Continuous>    MEDICATIONS  (PRN):        Vital Signs Last 24 Hrs  T(C): 36.1 (05 Nov 2023 07:55), Max: 37.1 (04 Nov 2023 13:18)  T(F): 97 (05 Nov 2023 07:55), Max: 98.8 (04 Nov 2023 13:18)  HR: 61 (05 Nov 2023 08:00) (59 - 80)  BP: 123/69 (05 Nov 2023 08:00) (95/57 - 139/76)  BP(mean): 89 (05 Nov 2023 08:00) (71 - 97)  RR: 24 (05 Nov 2023 08:00) (18 - 26)  SpO2: 96% (05 Nov 2023 08:00) (94% - 99%)    Parameters below as of 05 Nov 2023 06:00  Patient On (Oxygen Delivery Method): room air      I&O's Summary    04 Nov 2023 08:01  -  05 Nov 2023 07:00  --------------------------------------------------------  IN: 500 mL / OUT: 550 mL / NET: -50 mL        LABS                                            11.9                  Neurophils% (auto):   62.6   (11-05 @ 00:55):    6.20 )-----------(120          Lymphocytes% (auto):  30.2                                          36.1                   Eosinphils% (auto):   1.3      Manual%: Neutrophils x    ; Lymphocytes x    ; Eosinophils x    ; Bands%: x    ; Blasts x                                    137    |  104    |  17                  Calcium: 8.5   / iCa: x      (11-05 @ 00:55)    ----------------------------<  93        Magnesium: 2.1                              3.6     |  22     |  0.6              Phosphorous: 3.5      TPro  6.5    /  Alb  4.3    /  TBili  0.4    /  DBili  x      /  AST  43     /  ALT  18     /  AlkPhos  72     04 Nov 2023 16:00    ( 11-04 @ 17:02 )   PT: 12.00 sec;   INR: 1.05 ratio  aPTT: 24.6 sec    Assessment and Plan  78y Female with hx HTN, GERD, migraines, found to have trace SAH in left posterior temporal region    NEURO:  #trace SAH in L posterior temporal region  -Q4 neurochecks   -no neurosurgical intervention  -Repeat HCT for any change in mental status  -Keppra x 1 week  #Acute pain  -APAP ATC   #hx anxiety   -c/w home quetiapine 25mg PO daily  -c/w home sertraline 150mg PO daily   #hx cerebral atrophy   -follows with Dr. Greenberg   -intermittent episodes of confusion over the past few months since May  RESP:   #Oxygenation    -saturating well on RA  -encourage IS   #hx upper airway cough syndrome  -previously on azelastine-fluticasone (as of 06/2023)  -fluticasone spray q12 hours  #Activity    -ambulate w/ assistance (only with PT); hypotensive when standing     CARDS:   #syncope  -EKG on admission NSR  -trop neg x 2  -orthostatics: BP 79/52 when standing  -TTE pending  #hx HTN  - holding home metoprolol in the setting of orthostatic hypotension    GI/NUTR:   #Diet  -SLP eval --> soft and bite sized DASH, thin liquids  -aspiration precautions, HOB 30  #hx GERD   -c/w home pepcid 20mg BID   #hx moderate disordered esophageal motility  -found on endoscopy in 2019  -SLP eval pending  #hx gastroparesis  #Bowel regimen    -not indicated    /RENAL:   #urine output in critically ill  -NS @ 40/hr --> IV lock once tolerating PO diet  -300cc on bladder scan--> straight cath--> 550cc  -f/u TOV @ 11AM     Labs:          BUN/Cr- 18/0.7  -->,  17/0.6  -->          Electrolytes-Na 137 // K 3.6 // Mg 2.1 //  Phos 3.5 (11-05 @ 00:55)  #hypokalemia-repleted      HEME/ONC:   #DVT ppx  -holding per Nsx, reassess in AM  -SCDs    Labs: Hb/Hct:  12.0/37.2  -->,  11.9/36.1  -->                      Plts:  125  -->,  120  -->                 PTT/INR:  24.6/1.05  --->         ID:  #afebrile  WBC- 6.09  --->>,  6.20  --->>  Temp trend- 24hrs T(F): 97.6 (11-04 @ 23:46), Max: 98.8 (11-04 @ 13:18)    ENDO:   #blood glucose monitoring  -last   -A1C pending  MSK:  #hx triangular fibrocartilage tear s/p L wrist arthroscopy and debridement in 2021  #activity  -PT consult placed  -bedrest  LINES/DRAINS:  PIV    ADVANCED DIRECTIVES:  Full Code    HCP/Emergency Contact-[place] () [phone]    INDICATION FOR SICU/SDU: Nontraumatic subarachnoid hemorrhage             DISPO:   Downgrade to SDU. Case discussed with attending Dr. Hernandez      Patient signed out to Dr. Hernandez from trauma team on 11/5/23 at 11:56AM      For Follow-Up:  - trial of void  - DVT ppx reccs by neurosurgery  - ECHO pending

## 2023-11-05 NOTE — PROGRESS NOTE ADULT - SUBJECTIVE AND OBJECTIVE BOX
HD#2    S/P Left Post temp-parietal SAH    Pt seen and examined at bedside. Pt sts shes feeling better today. Denies HA, dizziness or visual changes.       Vital Signs Last 24 Hrs  T(C): 36.1 (05 Nov 2023 07:55), Max: 37.1 (04 Nov 2023 13:18)  T(F): 97 (05 Nov 2023 07:55), Max: 98.8 (04 Nov 2023 13:18)  HR: 61 (05 Nov 2023 08:00) (59 - 80)  BP: 123/69 (05 Nov 2023 08:00) (95/57 - 139/76)  BP(mean): 89 (05 Nov 2023 08:00) (71 - 97)  RR: 24 (05 Nov 2023 08:00) (18 - 26)  SpO2: 96% (05 Nov 2023 08:00) (94% - 99%)    Parameters below as of 05 Nov 2023 06:00  Patient On (Oxygen Delivery Method): room air        I&O's Detail    04 Nov 2023 08:01  -  05 Nov 2023 07:00  --------------------------------------------------------  IN:    IV PiggyBack: 200 mL    sodium chloride 0.9%: 60 mL    sodium chloride 0.9%: 240 mL  Total IN: 500 mL    OUT:    Intermittent Catheterization - Urethral (mL): 550 mL    Voided (mL): 0 mL  Total OUT: 550 mL    Total NET: -50 mL        I&O's Summary    04 Nov 2023 08:01  -  05 Nov 2023 07:00  --------------------------------------------------------  IN: 500 mL / OUT: 550 mL / NET: -50 mL        REVIEW OF SYSTEMS    [X] A ten-point review of systems was otherwise negative except as noted.  [ ] Due to altered mental status/intubation, subjective information were not able to be obtained from the patient. History was obtained, to the extent possible, from review of the chart and collateral sources of information.      PHYSICAL EXAM:  Neurological:  On PE:    A&Ox3 with clear speech  PERRL  EOMI  No droop  tongue midline  No drift  Finger to nose intact  DING - good strength  Follows complex commands    LABS:                        11.9   6.20  )-----------( 120      ( 05 Nov 2023 00:55 )             36.1     11-05    137  |  104  |  17  ----------------------------<  93  3.6   |  22  |  0.6<L>    Ca    8.5      05 Nov 2023 00:55  Phos  3.5     11-05  Mg     2.1     11-05    TPro  6.5  /  Alb  4.3  /  TBili  0.4  /  DBili  x   /  AST  43<H>  /  ALT  18  /  AlkPhos  72  11-04    PT/INR - ( 04 Nov 2023 17:02 )   PT: 12.00 sec;   INR: 1.05 ratio         PTT - ( 04 Nov 2023 17:02 )  PTT:24.6 sec  Urinalysis Basic - ( 05 Nov 2023 00:55 )    Color: x / Appearance: x / SG: x / pH: x  Gluc: 93 mg/dL / Ketone: x  / Bili: x / Urobili: x   Blood: x / Protein: x / Nitrite: x   Leuk Esterase: x / RBC: x / WBC x   Sq Epi: x / Non Sq Epi: x / Bacteria: x      CARDIAC MARKERS ( 05 Nov 2023 00:55 )  x     / <0.01 ng/mL / 504 U/L / x     / 21.0 ng/mL  CARDIAC MARKERS ( 04 Nov 2023 16:00 )  x     / <0.01 ng/mL / x     / x     / x          Allergies    chlorhexidine topical (Pruritus; Rash)    Intolerances      MEDICATIONS:  Antibiotics:    Neuro:  acetaminophen     Tablet .. 650 milliGRAM(s) Oral every 6 hours  levETIRAcetam 500 milliGRAM(s) Oral every 12 hours  QUEtiapine 25 milliGRAM(s) Oral at bedtime  sertraline 150 milliGRAM(s) Oral daily      IVF:  sodium chloride 0.9%. 1000 milliLiter(s) IV Continuous <Continuous>    RADIOLOGY & ADDITIONAL TESTS:      ASSESSMENT:  78y Female s/p    Nontraumatic subarachnoid hemorrhage    No pertinent family history in first degree relatives    FH: diabetes mellitus (Mother)    FH: prostate cancer (Father)    Handoff    MEWS Score    Hypertension    Migraines    Anxiety    Vertigo    H/O gastroesophageal reflux (GERD)    Hard of hearing    SAH (subarachnoid hemorrhage)    Ultrasound guided venous access    No significant past surgical history    Bilateral carpal tunnel syndrome    S/P arthroscopy of right knee    H/O arthroscopy of left knee    History of appendectomy    H/O sinus surgery    SYNCOPE    24    Syncope    [email or web address]        PLAN:  No further Neurosurgical Intervention  OK for SQHeparin  Can follow up with Dr. Major as o/p

## 2023-11-05 NOTE — PROGRESS NOTE ADULT - ASSESSMENT
78yF w/ PMHx of HTN, HLD, anxiety  who presented s/p mechanical fall +HT ?LOC -AC. Found to have trace acute SAH within left posterior region on CTH. Patient admitted to SICU for hemodynamic monitoring in the setting of SAH s/p syncopal fall.     PLAN:  - q4h neurochecks   - No neurosurgical intervention indicated   - Repeat HCT for any change in mental status   - Keppra x1 week   - Obtain daily labs w/ repletion of electrolytes as needed   - Pain control PRN  - DVT ppx   - Encourage OOB and ambulation  - OR planning/dispo planning      Trauma Team Surgery  [phone]      Date/Time: 11-05-23 @ 05:19

## 2023-11-06 LAB
A1C WITH ESTIMATED AVERAGE GLUCOSE RESULT: 6 % — HIGH (ref 4–5.6)
A1C WITH ESTIMATED AVERAGE GLUCOSE RESULT: 6 % — HIGH (ref 4–5.6)
ANION GAP SERPL CALC-SCNC: 12 MMOL/L — SIGNIFICANT CHANGE UP (ref 7–14)
ANION GAP SERPL CALC-SCNC: 12 MMOL/L — SIGNIFICANT CHANGE UP (ref 7–14)
BUN SERPL-MCNC: 14 MG/DL — SIGNIFICANT CHANGE UP (ref 10–20)
BUN SERPL-MCNC: 14 MG/DL — SIGNIFICANT CHANGE UP (ref 10–20)
CALCIUM SERPL-MCNC: 9.1 MG/DL — SIGNIFICANT CHANGE UP (ref 8.4–10.4)
CALCIUM SERPL-MCNC: 9.1 MG/DL — SIGNIFICANT CHANGE UP (ref 8.4–10.4)
CHLORIDE SERPL-SCNC: 108 MMOL/L — SIGNIFICANT CHANGE UP (ref 98–110)
CHLORIDE SERPL-SCNC: 108 MMOL/L — SIGNIFICANT CHANGE UP (ref 98–110)
CO2 SERPL-SCNC: 23 MMOL/L — SIGNIFICANT CHANGE UP (ref 17–32)
CO2 SERPL-SCNC: 23 MMOL/L — SIGNIFICANT CHANGE UP (ref 17–32)
CREAT SERPL-MCNC: 0.7 MG/DL — SIGNIFICANT CHANGE UP (ref 0.7–1.5)
CREAT SERPL-MCNC: 0.7 MG/DL — SIGNIFICANT CHANGE UP (ref 0.7–1.5)
EGFR: 89 ML/MIN/1.73M2 — SIGNIFICANT CHANGE UP
EGFR: 89 ML/MIN/1.73M2 — SIGNIFICANT CHANGE UP
ESTIMATED AVERAGE GLUCOSE: 126 MG/DL — HIGH (ref 68–114)
ESTIMATED AVERAGE GLUCOSE: 126 MG/DL — HIGH (ref 68–114)
GLUCOSE BLDC GLUCOMTR-MCNC: 115 MG/DL — HIGH (ref 70–99)
GLUCOSE BLDC GLUCOMTR-MCNC: 115 MG/DL — HIGH (ref 70–99)
GLUCOSE SERPL-MCNC: 105 MG/DL — HIGH (ref 70–99)
GLUCOSE SERPL-MCNC: 105 MG/DL — HIGH (ref 70–99)
HCT VFR BLD CALC: 38.8 % — SIGNIFICANT CHANGE UP (ref 37–47)
HCT VFR BLD CALC: 38.8 % — SIGNIFICANT CHANGE UP (ref 37–47)
HCV AB S/CO SERPL IA: 0.05 COI — SIGNIFICANT CHANGE UP
HCV AB S/CO SERPL IA: 0.05 COI — SIGNIFICANT CHANGE UP
HCV AB SERPL-IMP: SIGNIFICANT CHANGE UP
HCV AB SERPL-IMP: SIGNIFICANT CHANGE UP
HGB BLD-MCNC: 12.4 G/DL — SIGNIFICANT CHANGE UP (ref 12–16)
HGB BLD-MCNC: 12.4 G/DL — SIGNIFICANT CHANGE UP (ref 12–16)
MAGNESIUM SERPL-MCNC: 2.1 MG/DL — SIGNIFICANT CHANGE UP (ref 1.8–2.4)
MAGNESIUM SERPL-MCNC: 2.1 MG/DL — SIGNIFICANT CHANGE UP (ref 1.8–2.4)
MCHC RBC-ENTMCNC: 30.3 PG — SIGNIFICANT CHANGE UP (ref 27–31)
MCHC RBC-ENTMCNC: 30.3 PG — SIGNIFICANT CHANGE UP (ref 27–31)
MCHC RBC-ENTMCNC: 32 G/DL — SIGNIFICANT CHANGE UP (ref 32–37)
MCHC RBC-ENTMCNC: 32 G/DL — SIGNIFICANT CHANGE UP (ref 32–37)
MCV RBC AUTO: 94.9 FL — SIGNIFICANT CHANGE UP (ref 81–99)
MCV RBC AUTO: 94.9 FL — SIGNIFICANT CHANGE UP (ref 81–99)
NRBC # BLD: 0 /100 WBCS — SIGNIFICANT CHANGE UP (ref 0–0)
NRBC # BLD: 0 /100 WBCS — SIGNIFICANT CHANGE UP (ref 0–0)
PHOSPHATE SERPL-MCNC: 4.6 MG/DL — SIGNIFICANT CHANGE UP (ref 2.1–4.9)
PHOSPHATE SERPL-MCNC: 4.6 MG/DL — SIGNIFICANT CHANGE UP (ref 2.1–4.9)
PLATELET # BLD AUTO: 120 K/UL — LOW (ref 130–400)
PLATELET # BLD AUTO: 120 K/UL — LOW (ref 130–400)
PMV BLD: 11.4 FL — HIGH (ref 7.4–10.4)
PMV BLD: 11.4 FL — HIGH (ref 7.4–10.4)
POTASSIUM SERPL-MCNC: 3.6 MMOL/L — SIGNIFICANT CHANGE UP (ref 3.5–5)
POTASSIUM SERPL-MCNC: 3.6 MMOL/L — SIGNIFICANT CHANGE UP (ref 3.5–5)
POTASSIUM SERPL-SCNC: 3.6 MMOL/L — SIGNIFICANT CHANGE UP (ref 3.5–5)
POTASSIUM SERPL-SCNC: 3.6 MMOL/L — SIGNIFICANT CHANGE UP (ref 3.5–5)
RBC # BLD: 4.09 M/UL — LOW (ref 4.2–5.4)
RBC # BLD: 4.09 M/UL — LOW (ref 4.2–5.4)
RBC # FLD: 14.2 % — SIGNIFICANT CHANGE UP (ref 11.5–14.5)
RBC # FLD: 14.2 % — SIGNIFICANT CHANGE UP (ref 11.5–14.5)
SODIUM SERPL-SCNC: 143 MMOL/L — SIGNIFICANT CHANGE UP (ref 135–146)
SODIUM SERPL-SCNC: 143 MMOL/L — SIGNIFICANT CHANGE UP (ref 135–146)
WBC # BLD: 4.12 K/UL — LOW (ref 4.8–10.8)
WBC # BLD: 4.12 K/UL — LOW (ref 4.8–10.8)
WBC # FLD AUTO: 4.12 K/UL — LOW (ref 4.8–10.8)
WBC # FLD AUTO: 4.12 K/UL — LOW (ref 4.8–10.8)

## 2023-11-06 PROCEDURE — 99232 SBSQ HOSP IP/OBS MODERATE 35: CPT

## 2023-11-06 PROCEDURE — 99233 SBSQ HOSP IP/OBS HIGH 50: CPT

## 2023-11-06 RX ORDER — METOPROLOL TARTRATE 50 MG
12.5 TABLET ORAL DAILY
Refills: 0 | Status: DISCONTINUED | OUTPATIENT
Start: 2023-11-06 | End: 2023-11-07

## 2023-11-06 RX ADMIN — HEPARIN SODIUM 5000 UNIT(S): 5000 INJECTION INTRAVENOUS; SUBCUTANEOUS at 13:13

## 2023-11-06 RX ADMIN — QUETIAPINE FUMARATE 25 MILLIGRAM(S): 200 TABLET, FILM COATED ORAL at 21:08

## 2023-11-06 RX ADMIN — FAMOTIDINE 20 MILLIGRAM(S): 10 INJECTION INTRAVENOUS at 06:30

## 2023-11-06 RX ADMIN — SENNA PLUS 2 TABLET(S): 8.6 TABLET ORAL at 21:07

## 2023-11-06 RX ADMIN — SERTRALINE 150 MILLIGRAM(S): 25 TABLET, FILM COATED ORAL at 13:13

## 2023-11-06 RX ADMIN — Medication 650 MILLIGRAM(S): at 13:12

## 2023-11-06 RX ADMIN — Medication 650 MILLIGRAM(S): at 17:33

## 2023-11-06 RX ADMIN — Medication 650 MILLIGRAM(S): at 06:30

## 2023-11-06 RX ADMIN — Medication 1 SPRAY(S): at 17:33

## 2023-11-06 RX ADMIN — HEPARIN SODIUM 5000 UNIT(S): 5000 INJECTION INTRAVENOUS; SUBCUTANEOUS at 06:30

## 2023-11-06 RX ADMIN — Medication 650 MILLIGRAM(S): at 23:12

## 2023-11-06 RX ADMIN — Medication 1 SPRAY(S): at 06:30

## 2023-11-06 RX ADMIN — HEPARIN SODIUM 5000 UNIT(S): 5000 INJECTION INTRAVENOUS; SUBCUTANEOUS at 21:07

## 2023-11-06 RX ADMIN — LEVETIRACETAM 500 MILLIGRAM(S): 250 TABLET, FILM COATED ORAL at 17:33

## 2023-11-06 RX ADMIN — FAMOTIDINE 20 MILLIGRAM(S): 10 INJECTION INTRAVENOUS at 17:33

## 2023-11-06 RX ADMIN — Medication 650 MILLIGRAM(S): at 07:00

## 2023-11-06 RX ADMIN — LEVETIRACETAM 500 MILLIGRAM(S): 250 TABLET, FILM COATED ORAL at 06:30

## 2023-11-06 NOTE — PROGRESS NOTE ADULT - SUBJECTIVE AND OBJECTIVE BOX
SUBJECTIVE:    Patient is a 78y old Female who presents with a chief complaint of SAH (06 Nov 2023 15:37)    Currently admitted to medicine with the primary diagnosis of SAH (subarachnoid hemorrhage)       Today is hospital day 2d.     PAST MEDICAL & SURGICAL HISTORY  Hypertension    Migraines    Anxiety    Vertigo    H/O gastroesophageal reflux (GERD)    Hard of hearing  uses bilateral hearing aides    Bilateral carpal tunnel syndrome  repair    S/P arthroscopy of right knee    H/O arthroscopy of left knee    History of appendectomy    H/O sinus surgery      ALLERGIES:  chlorhexidine topical (Pruritus; Rash)    MEDICATIONS:  STANDING MEDICATIONS  acetaminophen     Tablet .. 650 milliGRAM(s) Oral every 6 hours  famotidine    Tablet 20 milliGRAM(s) Oral every 12 hours  fluticasone propionate 50 MICROgram(s)/spray Nasal Spray 1 Spray(s) Both Nostrils every 12 hours  heparin   Injectable 5000 Unit(s) SubCutaneous every 8 hours  levETIRAcetam 500 milliGRAM(s) Oral every 12 hours  metoprolol succinate ER 12.5 milliGRAM(s) Oral daily  QUEtiapine 25 milliGRAM(s) Oral at bedtime  senna 2 Tablet(s) Oral at bedtime  sertraline 150 milliGRAM(s) Oral daily    PRN MEDICATIONS    VITALS:   T(F): 97.6  HR: 66  BP: 134/78  RR: 20  SpO2: 96%    LABS:                        12.4   4.12  )-----------( 120      ( 06 Nov 2023 04:41 )             38.8     11-06    143  |  108  |  14  ----------------------------<  105<H>  3.6   |  23  |  0.7    Ca    9.1      06 Nov 2023 04:41  Phos  4.6     11-06  Mg     2.1     11-06      PT/INR - ( 04 Nov 2023 17:02 )   PT: 12.00 sec;   INR: 1.05 ratio         PTT - ( 04 Nov 2023 17:02 )  PTT:24.6 sec  Urinalysis Basic - ( 06 Nov 2023 04:41 )    Color: x / Appearance: x / SG: x / pH: x  Gluc: 105 mg/dL / Ketone: x  / Bili: x / Urobili: x   Blood: x / Protein: x / Nitrite: x   Leuk Esterase: x / RBC: x / WBC x   Sq Epi: x / Non Sq Epi: x / Bacteria: x            CARDIAC MARKERS ( 05 Nov 2023 00:55 )  x     / <0.01 ng/mL / 504 U/L / x     / 21.0 ng/mL      RADIOLOGY:    PHYSICAL EXAM:  GEN: No acute distress  LUNGS: Clear to auscultation bilaterally   HEART: S1/S2 present. RRR.   ABD/ GI: Soft, non-tender, non-distended. Bowel sounds present  EXT: NC/NC/NE/2+PP/DING  NEURO: AAOX3

## 2023-11-06 NOTE — PROGRESS NOTE ADULT - ASSESSMENT
79 y/o female, S/P Syncope and fall.    #Traumatic Left SAH.--  trace noted in lt post temporal region-- no mass effect or midline shift--seen by neurosurgery--No further Neurosurgical Intervention  OK for SQHeparin  she is on keppra as per neurosurgery  no neuro deficit    # Hx of severe anxiety-- on lorazepam 0.5mg TID-- that lead to fall-- now off it  she is also on seroquel 25mg which helps with hallucinations  Zoloft  150mg -- family wants to taper-- can follow with outpatient psychiatrist    # Normotensive--not on bP meds  # Dementia. stable      DC planning as per trauma team-- spoke with family at bedside

## 2023-11-06 NOTE — SWALLOW BEDSIDE ASSESSMENT ADULT - SLP GENERAL OBSERVATIONS
Pt found laying in bed a&ox3 w/  at bedside. Pt denies any hx of dysphagia.
pt received OOB to chair awake alert some forgetfulness w/o c/o pain. +room air +spouse at bedside who reports pt's cognition is at baseline

## 2023-11-06 NOTE — SWALLOW BEDSIDE ASSESSMENT ADULT - SWALLOW EVAL: DIAGNOSIS
+toleration for regular solids and thin liquids w/o overt s/s aspiration/penetration
+ toleration of SBS & thin liquids w/o overt s/s of aspiration/penetration. Suspected oropharyngeal dysphagia w/ regular solids

## 2023-11-06 NOTE — PHYSICAL THERAPY INITIAL EVALUATION ADULT - GENERAL OBSERVATIONS, REHAB EVAL
1:40-2:13 pt encountered in bed in NAD, +tele, spouse present.  She had good tolerance for mobility and ambulation with RW and supervision, limited by unsteady gait.  Pt would benefit from Home PT to restore prior level of mobility and safety.

## 2023-11-06 NOTE — SWALLOW BEDSIDE ASSESSMENT ADULT - COMMENTS
Esophagram done in 3/2023 for difficulty swallowing which showed moderate disordered esophageal motility. Pt also w/ hx of gastric emptying study in April 2023 which was abnormal c/w gastroparesis.

## 2023-11-06 NOTE — SWALLOW BEDSIDE ASSESSMENT ADULT - SWALLOW EVAL: RECOMMENDED DIET
advance to regular, thin liquids easy to chew, thin liquids (regular solids not recommended 2' hx of gastroparesis and esophageal dysmotility)

## 2023-11-06 NOTE — PROGRESS NOTE ADULT - ASSESSMENT
78yF w/ PMHx of HTN, HLD, anxiety  who presented s/p mechanical fall +HT ?LOC -AC. Found to have trace acute SAH within left posterior region on CTH. Patient admitted to SICU for hemodynamic monitoring in the setting of SAH s/p syncopal fall.     PLAN:  - downgrading to the floor  - q4h neurochecks   - No neurosurgical intervention indicated   - Repeat HCT for any change in mental status   - Keppra x1 week   - Obtain daily labs w/ repletion of electrolytes as needed   - Pain control PRN  - DVT ppx   - Encourage OOB and ambulation  - PT: home PT with rolling walker

## 2023-11-06 NOTE — PHYSICAL THERAPY INITIAL EVALUATION ADULT - IMPAIRED TRANSFERS: SIT/STAND, REHAB EVAL
Subjective:       Patient ID: Matt Estrada Jr. is a 85 y.o. male.    Chief Complaint: Insomnia (follow up ) and Sinus Problem (body ache, and congestion x1 days )    HPI    Sinus congestion and body aches x 1 day. Tmax 99. Pt is taking cetirizine and fluticasone which has been helping. Perhaps an increase in his productive cough from pulmonary fibrosis.     Insomnia - Pt has been on xanax 0.5mg for 3+ years for sleep at night. He has difficulty for sleep latency and difficulty staying asleep.     He takes xanax nightly, and it helps him fall asleep for only an hour or so and then he wakes up. He would like to get off of this.               Current Outpatient Prescriptions on File Prior to Visit   Medication Sig Dispense Refill    ADVAIR DISKUS 250-50 mcg/dose diskus inhaler USE 1 INHALATION BY MOUTH TWICE DAILY 60 each 5    albuterol 90 mcg/actuation inhaler Inhale 2 puffs into the lungs every 4 (four) hours as needed for Wheezing or Shortness of Breath. 1 Inhaler 0    albuterol-ipratropium 2.5mg-0.5mg/3mL (DUO-NEB) 0.5 mg-3 mg(2.5 mg base)/3 mL nebulizer solution USE 3 ML VIA NEBULIZER EVERY 6 HOURS AS NEEDED FOR WHEEZING OR SHORTNESS OF BREATH 4050 mL 11    aspirin (ECOTRIN) 81 MG EC tablet Take 81 mg by mouth every morning.       aspirin-acetaminophen-caffeine 250-250-65 mg (EXCEDRIN MIGRAINE) 250-250-65 mg per tablet Take 1 tablet by mouth every 6 (six) hours as needed for Pain.       benazepril (LOTENSIN) 10 MG tablet TAKE 1 TABLET BY MOUTH EVERY MORNING; HOLD UNTIL FOLLOW UP WITH PCP 90 tablet 0    clopidogrel (PLAVIX) 75 mg tablet Take 1 tablet (75 mg total) by mouth every morning. 90 tablet 3    cyanocobalamin (VITAMIN B-12) 1000 MCG tablet Take 100 mcg by mouth every morning.       DULCOLAX, BISACODYL, ORAL Take 1 tablet by mouth every evening.       fluticasone (FLONASE) 50 mcg/actuation nasal spray 1 spray by Each Nare route 2 (two) times daily. 1 Bottle 3    folic acid (FOLVITE) 1 MG tablet  Take 1 mg by mouth every morning.       IRON, FERROUS SULFATE, ORAL Take 1 tablet by mouth once daily.      pyridoxine (B-6) 100 MG Tab Take 100 mg by mouth every morning.       simvastatin (ZOCOR) 20 MG tablet Take 1 tablet (20 mg total) by mouth every evening. 90 tablet 1    tamsulosin (FLOMAX) 0.4 mg Cp24 Take 0.4 mg by mouth once daily.       VIRTUSSIN AC  mg/5 mL syrup TAKE ONE TEASPOONFUL BY MOUTH THREE TIMES DAILY AS NEEDED FOR COUGH AND CONGESTION 180 mL 3    [DISCONTINUED] alprazolam (XANAX) 0.5 MG tablet Take 1 tablet (0.5 mg total) by mouth 2 (two) times daily as needed for Anxiety. 90 tablet 0     No current facility-administered medications on file prior to visit.        Past Medical History:   Diagnosis Date    Anemia of chronic disease 2016    Anticoagulant long-term use     Anxiety 2017    Arthritis     Cataract     Chronic bronchitis     Clotting disorder     COPD (chronic obstructive pulmonary disease)     Coronary artery disease     Emphysema of lung     Hypertension     Primary insomnia 2017    PVD (peripheral vascular disease)     Stroke     TIA       Family History   Problem Relation Age of Onset    Heart attack Father     Heart failure Father     Hypertension Father     Alcohol abuse Father     Cancer Mother      breast cancer-  of metastasis     No Known Problems Sister     Cancer Brother      bladder     No Known Problems Maternal Aunt     No Known Problems Maternal Uncle     No Known Problems Paternal Aunt     No Known Problems Paternal Uncle     No Known Problems Maternal Grandmother     No Known Problems Maternal Grandfather     No Known Problems Paternal Grandmother     No Known Problems Paternal Grandfather     Amblyopia Neg Hx     Blindness Neg Hx     Cataracts Neg Hx     Diabetes Neg Hx     Glaucoma Neg Hx     Macular degeneration Neg Hx     Retinal detachment Neg Hx     Strabismus Neg Hx     Stroke Neg  Hx     Thyroid disease Neg Hx         reports that he quit smoking about 8 years ago. He has a 80.00 pack-year smoking history. He has never used smokeless tobacco. He reports that he does not drink alcohol or use drugs.    Review of Systems   Constitutional: Negative for chills and fever.   HENT: Positive for congestion.    Respiratory: Positive for cough. Negative for shortness of breath.    Psychiatric/Behavioral: Positive for sleep disturbance. The patient is nervous/anxious.        Objective:     Vitals:    08/23/17 1108   BP: (!) 150/80   Pulse: 95   Resp: 20   Temp: 98.5 °F (36.9 °C)        Physical Exam   Constitutional: He appears well-developed. No distress.   HENT:   Head: Normocephalic and atraumatic.   Eyes: Conjunctivae are normal. No scleral icterus.   Cardiovascular: Normal rate and regular rhythm.    Pulmonary/Chest: Effort normal. He has wheezes (slight anteriorly). He has rales.   Neurological: He is alert.   Psychiatric: He has a normal mood and affect. His behavior is normal.   Nursing note and vitals reviewed.      Assessment:       1. Anxiolytic dependence    2. Primary insomnia    3. Anxiety    4. Cough        Plan:       Matt was seen today for insomnia and sinus problem.    Diagnoses and all orders for this visit:    Anxiolytic dependence  Pt to begin weaning off today. Pt to let me know if he has any difficulties, but I do not expect any.       Anxiety  -     doxepin (SINEQUAN) 25 MG capsule; Take 1 capsule (25 mg total) by mouth every evening.  I am using this in hopes to help him wean off of xanax, and help hsi sleeping difficulties as well.     Primary insomnia  -     doxepin (SINEQUAN) 25 MG capsule; Take 1 capsule (25 mg total) by mouth every evening.  See below.       Cough  -     X-Ray Chest PA And Lateral; Future    Likely URI vs flare of fibrotic lung disease  I counseled the patient on fluids, rest, OTC medications that can safely be used such as Mucinex, Zyrtec and cough  syrup (if pt does not have htn), alternative therapies such as honey, hand/cough hygiene, and expected course of illness. I also advised them when to seek further medical attention, including but not limited to the development of  persistently high fevers, shortness of breath, or persistent vomiting.    F/u as needed or as previously scheduled.             Return if symptoms worsen or fail to improve.        Pt verbalized understanding and agreed with our plan.    impaired balance/decreased strength

## 2023-11-06 NOTE — SWALLOW BEDSIDE ASSESSMENT ADULT - SWALLOW EVAL: RECOMMENDED FEEDING/EATING TECHNIQUES
position upright (90 degrees)/small sips/bites
maintain upright posture during/after eating for 30 mins/position upright (90 degrees)/small sips/bites

## 2023-11-06 NOTE — SWALLOW BEDSIDE ASSESSMENT ADULT - ASR SWALLOW ASPIRATION MONITOR
change of breathing pattern/position upright (90Y)/cough/gurgly voice/fever/pneumonia/throat clearing/upper respiratory infection
position upright (90Y)

## 2023-11-06 NOTE — PROGRESS NOTE ADULT - ATTENDING COMMENTS
[id number] has no neuro deficit.  On Keppra.  Had 2D echo done.  Not mobilized yet.    PE:  AAO x2 (mildly confused)  Chest: slightly decreased breath sounds in bilateral lung bases.  CV : rrr  Abdomen; soft.  Extr: no edema    ASSESSMENT:  77 y/o female, S/P Syncope and fall.  Traumatic Left SAH.  Atelectasis.  HTN.  Dementia.    PLAN:  - intermittent neuro checks  - on Keppra for 7 days  - encourage incentive spirometer  - keep normotensive  - check orthostatic BP  - check ECG for QT intervals  - on Seroquel  - follow serum electrolytes and UOP  - DVT prophylaxis   - PT  SDU care today [id number] has no neuro deficit.  On Keppra.  Had 2D echo done.  Not mobilized yet.    PE:  AAO x2 (mildly confused)  Chest: slightly decreased breath sounds in bilateral lung bases.  CV : rrr  Abdomen; soft.  Extr: no edema    ASSESSMENT:  77 y/o female, S/P Syncope and fall.  Traumatic Left SAH.  At risk for neuro deficit.  Atelectasis.  HTN.  Dementia.    PLAN:  - intermittent neuro checks  - on Keppra for 7 days  - encourage incentive spirometer  - keep normotensive  - check orthostatic BP  - check ECG for QT intervals  - on Seroquel  - follow serum electrolytes and UOP  - DVT prophylaxis   - PT  SDU care today

## 2023-11-06 NOTE — PHYSICAL THERAPY INITIAL EVALUATION ADULT - PERTINENT HX OF CURRENT PROBLEM, REHAB EVAL
78yF w/ PMHx of GERD, anxiety,  seen as Trauma Consult s/p unwitnessed, +ht, +loc, -ac. The patient states she woke up to use the restroom in the middle of the night, she then washed her hands and fell backwards striking her head against the wall. The patient endorses LOC, denies any pain. The patient states she fell on Saturday, but mentions it was several days ago, is A+Ox2 (unclear if this is her baseline). The patient has no external signs of trauma. Pt c/o HA. Denies dizziness or visual changes

## 2023-11-06 NOTE — SWALLOW BEDSIDE ASSESSMENT ADULT - SLP PERTINENT HISTORY OF CURRENT PROBLEM
Pt is a 77 y/o F w/ PMHx: HTN, HLD, anxiety, who presented s/p mechanical fall. +HT ?LOC -AC. Pt found to have trace acute SAH within left posterior region on CTH. Pt admitted to SICU for hemodynamic monitoring i/s/o SAH s/p syncopal fall. CT cervical spine (-).
78yF w/ PMHx of GERD, anxiety,  seen as Trauma Consult s/p unwitnessed, +ht, +loc, -ac. The patient states she woke up to use the restroom in the middle of the night, she then washed her hands and fell backwards striking her head against the wall. The patient endorses LOC, denies any pain. The patient states she fell on Saturday, but mentions it was several days ago, is A+Ox2 (unclear if this is her baseline). The patient has no external signs of trauma. Pt c/o HA. Denies dizziness or visual changes.

## 2023-11-06 NOTE — PROGRESS NOTE ADULT - SUBJECTIVE AND OBJECTIVE BOX
GENERAL SURGERY PROGRESS NOTE     BARRIE WALKER  78y  Female  Hospital day :2d  POD:  Procedure: Ultrasound guided venous access      OVERNIGHT EVENTS: patient worked with PT, recs home PT. Patient being downgraded to the floor    T(F): 97.6 (11-06-23 @ 12:00), Max: 98.2 (11-05-23 @ 20:00)  HR: 66 (11-06-23 @ 12:00) (55 - 66)  BP: 134/78 (11-06-23 @ 12:00) (116/64 - 138/82)  ABP: --  ABP(mean): --  RR: 20 (11-06-23 @ 12:00) (18 - 20)  SpO2: 96% (11-06-23 @ 12:00) (94% - 96%)    DIET/FLUIDS:   NG:                                                                                DRAINS:     BM:     EMESIS:     URINE:   11-05-23 @ 07:01  -  11-06-23 @ 07:00  --------------------------------------------------------  OUT: 550 mL       GI proph:  famotidine    Tablet 20 milliGRAM(s) Oral every 12 hours    AC/ proph: heparin   Injectable 5000 Unit(s) SubCutaneous every 8 hours    ABx:     PHYSICAL EXAM:  GENERAL: NAD, well-appearing  CHEST/LUNG: Non-labored breathing  HEART: Regular rate and rhythm  ABDOMEN: Soft, Nontender, Nondistended;   EXTREMITIES:  No clubbing, cyanosis, or edema    LABS  Labs:  CAPILLARY BLOOD GLUCOSE      POCT Blood Glucose.: 115 mg/dL (06 Nov 2023 13:08)                          12.4   4.12  )-----------( 120      ( 06 Nov 2023 04:41 )             38.8         11-06    143  |  108  |  14  ----------------------------<  105<H>  3.6   |  23  |  0.7      Calcium: 9.1 mg/dL (11-06-23 @ 04:41)      LFTs:             6.5  | 0.4  | 43       ------------------[72      ( 04 Nov 2023 16:00 )  4.3  | x    | 18          Lipase:x      Amylase:x             Coags:     12.00  ----< 1.05    ( 04 Nov 2023 17:02 )     24.6        CARDIAC MARKERS ( 05 Nov 2023 00:55 )  x     / <0.01 ng/mL / 504 U/L / x     / 21.0 ng/mL  CARDIAC MARKERS ( 04 Nov 2023 16:00 )  x     / <0.01 ng/mL / x     / x     / x              Urinalysis Basic - ( 06 Nov 2023 04:41 )    Color: x / Appearance: x / SG: x / pH: x  Gluc: 105 mg/dL / Ketone: x  / Bili: x / Urobili: x   Blood: x / Protein: x / Nitrite: x   Leuk Esterase: x / RBC: x / WBC x   Sq Epi: x / Non Sq Epi: x / Bacteria: x            RADIOLOGY & ADDITIONAL TESTS:      A/P

## 2023-11-07 ENCOUNTER — TRANSCRIPTION ENCOUNTER (OUTPATIENT)
Age: 78
End: 2023-11-07

## 2023-11-07 VITALS
SYSTOLIC BLOOD PRESSURE: 142 MMHG | RESPIRATION RATE: 18 BRPM | DIASTOLIC BLOOD PRESSURE: 69 MMHG | TEMPERATURE: 98 F | HEART RATE: 60 BPM | OXYGEN SATURATION: 98 %

## 2023-11-07 PROCEDURE — 99238 HOSP IP/OBS DSCHRG MGMT 30/<: CPT

## 2023-11-07 RX ORDER — LEVETIRACETAM 250 MG/1
1 TABLET, FILM COATED ORAL
Qty: 10 | Refills: 0
Start: 2023-11-07 | End: 2023-11-11

## 2023-11-07 RX ORDER — SODIUM CHLORIDE 9 MG/ML
500 INJECTION, SOLUTION INTRAVENOUS ONCE
Refills: 0 | Status: COMPLETED | OUTPATIENT
Start: 2023-11-07 | End: 2023-11-07

## 2023-11-07 RX ORDER — METOPROLOL TARTRATE 50 MG
0 TABLET ORAL
Qty: 0 | Refills: 1 | DISCHARGE

## 2023-11-07 RX ORDER — SERTRALINE 25 MG/1
1 TABLET, FILM COATED ORAL
Qty: 0 | Refills: 0 | DISCHARGE

## 2023-11-07 RX ADMIN — HEPARIN SODIUM 5000 UNIT(S): 5000 INJECTION INTRAVENOUS; SUBCUTANEOUS at 05:23

## 2023-11-07 RX ADMIN — FAMOTIDINE 20 MILLIGRAM(S): 10 INJECTION INTRAVENOUS at 05:22

## 2023-11-07 RX ADMIN — Medication 650 MILLIGRAM(S): at 06:44

## 2023-11-07 RX ADMIN — Medication 650 MILLIGRAM(S): at 11:43

## 2023-11-07 RX ADMIN — Medication 650 MILLIGRAM(S): at 05:20

## 2023-11-07 RX ADMIN — SODIUM CHLORIDE 666.67 MILLILITER(S): 9 INJECTION, SOLUTION INTRAVENOUS at 11:43

## 2023-11-07 RX ADMIN — LEVETIRACETAM 500 MILLIGRAM(S): 250 TABLET, FILM COATED ORAL at 05:22

## 2023-11-07 RX ADMIN — Medication 12.5 MILLIGRAM(S): at 05:21

## 2023-11-07 RX ADMIN — SERTRALINE 150 MILLIGRAM(S): 25 TABLET, FILM COATED ORAL at 11:52

## 2023-11-07 RX ADMIN — Medication 1 SPRAY(S): at 05:22

## 2023-11-07 NOTE — DISCHARGE NOTE PROVIDER - NSDCCPCAREPLAN_GEN_ALL_CORE_FT
PRINCIPAL DISCHARGE DIAGNOSIS  Diagnosis: SAH (subarachnoid hemorrhage)  Assessment and Plan of Treatment:       SECONDARY DISCHARGE DIAGNOSES  Diagnosis: Syncope  Assessment and Plan of Treatment:

## 2023-11-07 NOTE — DISCHARGE NOTE PROVIDER - HOSPITAL COURSE
78yF w/ PMHx of GERD, anxiety,  seen as Trauma Consult s/p unwitnessed, +ht, +loc, -ac. The patient states she woke up to use the restroom in the middle of the night, she then washed her hands and fell backwards striking her head against the wall. The patient endorses LOC, denies any pain. The patient states she fell on Saturday, but mentions it was several days ago, is A+Ox2 (unclear if this is her baseline). The patient has no external signs of trauma. Pt c/o HA. Denies dizziness or visual changes. CTH revealing trace subarachnoid hemorrhage. Neurosurgery evaluated the patient and no surgical intervention warranted, was admitted to SICU for q1h neurochecks and started on keppra. Patient was then downgraded to the floor without any changes in mental status. She was able to ambulate with physical therapy and was deemed appropriate for safe discharge home with home physical therapy. At time of discharge patient is tolerating regular diet, ambulating, voiding spontaneously, medically stable for discharge. Discharge instructions and follow up information given to patient and patient's  prior to discharge.

## 2023-11-07 NOTE — DISCHARGE NOTE PROVIDER - NSFOLLOWUPCLINICS_GEN_ALL_ED_FT
[facility]  Trauma Surgery  [address] [place] [address]  Phone: [phone]  Fax:

## 2023-11-07 NOTE — PROGRESS NOTE ADULT - ATTENDING COMMENTS
Patient is neuro intact.  Afebrile.  More oriented today.    ASSESSMENT:  79 y/o female, S/P Syncope and fall.  Traumatic Left SAH.  At risk for neuro deficit.  Atelectasis.  HTN.  Dementia.    PLAN:  - on Keppra x 7d  - PT  - IS  - DVT prophylaxis  - SS for discharge planning

## 2023-11-07 NOTE — DISCHARGE NOTE PROVIDER - NSDCFUSCHEDAPPT_GEN_ALL_CORE_FT
Sathish Gill  [facility]  Scheduled Appointment: 11/14/2023    [facility]  PULMED  [address]  Scheduled Appointment: 11/14/2023    [facility]  PULMMED [address]  Scheduled Appointment: 12/26/2023    Sathish Gill  [facility]  PULMMED [address]  Scheduled Appointment: 12/26/2023

## 2023-11-07 NOTE — PROGRESS NOTE ADULT - ASSESSMENT
78yF w/ PMHx of HTN, HLD, anxiety  who presented s/p mechanical fall +HT ?LOC -AC. Found to have trace acute SAH within left posterior region on CTH. Patient admitted to SICU for hemodynamic monitoring in the setting of SAH s/p syncopal fall.     PLAN:  - q4h neurochecks   - No neurosurgical intervention indicated   - Repeat HCT for any change in mental status   - Keppra x1 week   - Pain control PRN  - DVT ppx   - Encourage OOB and ambulation  - PT: home PT with rolling walker  - Likely discharge this morning

## 2023-11-07 NOTE — DISCHARGE NOTE PROVIDER - CARE PROVIDER_API CALL
Sabrina Major  Neurosurgery  [address] [place] [address], [address]  [place] [address]  Phone: [phone]  Fax: [phone]  Follow Up Time:

## 2023-11-07 NOTE — DISCHARGE NOTE NURSING/CASE MANAGEMENT/SOCIAL WORK - NSTRANSFERBELONGINGSRESP_GEN_A_NUR
CBC and pTT results AND urethra bleeding again. Hemoglobin 13.6 and hematocrit 43.1 + aptt 51.4, PAOLA Small aware. Pt urethra slowly oozing/dripping blood again, PAOLA Small aware. yes

## 2023-11-07 NOTE — DISCHARGE NOTE PROVIDER - NSDCQMSTATINA_GEN_A_CORE
no rashes , no suspicious lesions , no areas of discoloration , no jaundice present , good turgor , no masses , no tenderness on palpation
No, not prescribed...

## 2023-11-07 NOTE — DISCHARGE NOTE PROVIDER - NSDCMRMEDTOKEN_GEN_ALL_CORE_FT
LORazepam 0.5 mg oral tablet: 1 tab(s) orally 3 times a day  METOPROLOL SUCC ER 25 MG TAB: TAKE 1 TABLET BY MOUTH EVERY DAY  Pepcid 20 mg oral tablet: 1 tab(s) orally 2 times a day  QUEtiapine 25 mg oral tablet: 1 tab(s) orally once a day (in the evening)  sertraline 100 mg oral tablet: 1 tab(s) orally   Keppra 500 mg oral tablet: 1 tab(s) orally every 12 hours  LORazepam 0.5 mg oral tablet: 1 tab(s) orally 3 times a day  METOPROLOL SUCC ER 25 MG TAB: orally once a day TAKE 1 TABLET BY MOUTH EVERY DAY  Pepcid 20 mg oral tablet: 1 tab(s) orally 2 times a day  QUEtiapine 25 mg oral tablet: 1 tab(s) orally once a day (in the evening)  sertraline 100 mg oral tablet: 1 tab(s) orally once a day

## 2023-11-07 NOTE — DISCHARGE NOTE PROVIDER - NSDCFUADDINST_GEN_ALL_CORE_FT
You are being discharge from [facility] following admittance for subarachnoid hemorrhage. You were evaluated by neurosurgery and prescribed keppra for one week and need to follow up with Dr. Major, your nuariellebernadetteon, within 1-2 weeks of discharge. You may return to your regular diet. You may ambulate and return to your regular level of activity as tolerated. Please adhere to the program set forth for you by your physical therapists. Please call the office or return to the ED if you experience severe headache, vision changes, lightheadedness, syncope, chest pain, shortness of breath, weakness, numbness/tingling, lethargy, change in mental status.

## 2023-11-07 NOTE — PROGRESS NOTE ADULT - SUBJECTIVE AND OBJECTIVE BOX
GENERAL SURGERY PROGRESS NOTE     BARRIE WALKER  78y  Female  Hospital day :3d  POD:  Procedure: Ultrasound guided venous access      OVERNIGHT EVENTS: patient became confused and frustrated overnight about wanting to be discharged, re-oriented patient and explained that we are planning for discharge this morning    T(F): 97.2 (11-06-23 @ 20:00), Max: 97.6 (11-06-23 @ 12:00)  HR: 60 (11-06-23 @ 20:00) (55 - 70)  BP: 130/72 (11-06-23 @ 20:00) (117/66 - 138/82)  ABP: --  ABP(mean): --  RR: 20 (11-06-23 @ 20:00) (18 - 20)  SpO2: 98% (11-06-23 @ 20:00) (95% - 99%)    DIET/FLUIDS:   NG:                                                                                DRAINS:     BM:     EMESIS:     URINE:   11-05-23 @ 07:01  -  11-06-23 @ 07:00  --------------------------------------------------------  OUT: 550 mL       GI proph:  famotidine    Tablet 20 milliGRAM(s) Oral every 12 hours    AC/ proph: heparin   Injectable 5000 Unit(s) SubCutaneous every 8 hours    ABx:     PHYSICAL EXAM:  GENERAL: NAD, well-appearing  CHEST/LUNG: Non-labored breathing  HEART: Regular rate and rhythm  ABDOMEN: Soft, Nontender, Nondistended;   EXTREMITIES:  No clubbing, cyanosis, or edema      LABS  Labs:  CAPILLARY BLOOD GLUCOSE      POCT Blood Glucose.: 115 mg/dL (06 Nov 2023 13:08)                          12.4   4.12  )-----------( 120      ( 06 Nov 2023 04:41 )             38.8         11-06    143  |  108  |  14  ----------------------------<  105<H>  3.6   |  23  |  0.7      Calcium: 9.1 mg/dL (11-06-23 @ 04:41)      LFTs:         Coags:            Urinalysis Basic - ( 06 Nov 2023 04:41 )    Color: x / Appearance: x / SG: x / pH: x  Gluc: 105 mg/dL / Ketone: x  / Bili: x / Urobili: x   Blood: x / Protein: x / Nitrite: x   Leuk Esterase: x / RBC: x / WBC x   Sq Epi: x / Non Sq Epi: x / Bacteria: x            RADIOLOGY & ADDITIONAL TESTS:      A/P

## 2023-11-07 NOTE — DISCHARGE NOTE PROVIDER - DISCHARGE SERVICE FOR PATIENT
on the discharge service for the patient. I have reviewed and made amendments to the documentation where necessary.
21-Sep-2019 00:20

## 2023-11-07 NOTE — DISCHARGE NOTE NURSING/CASE MANAGEMENT/SOCIAL WORK - PATIENT PORTAL LINK FT
You can access the FollowMyHealth Patient Portal offered by [facility] by registering at the following website: [email or web address]. By joining [employer]’s FollowMyHealth portal, you will also be able to view your health information using other applications (apps) compatible with our system.

## 2023-11-10 DIAGNOSIS — Z90.49 ACQUIRED ABSENCE OF OTHER SPECIFIED PARTS OF DIGESTIVE TRACT: ICD-10-CM

## 2023-11-10 DIAGNOSIS — F03.90 UNSPECIFIED DEMENTIA, UNSPECIFIED SEVERITY, WITHOUT BEHAVIORAL DISTURBANCE, PSYCHOTIC DISTURBANCE, MOOD DISTURBANCE, AND ANXIETY: ICD-10-CM

## 2023-11-10 DIAGNOSIS — S06.6X9A TRAUMATIC SUBARACHNOID HEMORRHAGE WITH LOSS OF CONSCIOUSNESS OF UNSPECIFIED DURATION, INITIAL ENCOUNTER: ICD-10-CM

## 2023-11-10 DIAGNOSIS — R55 SYNCOPE AND COLLAPSE: ICD-10-CM

## 2023-11-10 DIAGNOSIS — E87.6 HYPOKALEMIA: ICD-10-CM

## 2023-11-10 DIAGNOSIS — Z79.899 OTHER LONG TERM (CURRENT) DRUG THERAPY: ICD-10-CM

## 2023-11-10 DIAGNOSIS — J98.11 ATELECTASIS: ICD-10-CM

## 2023-11-10 DIAGNOSIS — Z88.8 ALLERGY STATUS TO OTHER DRUGS, MEDICAMENTS AND BIOLOGICAL SUBSTANCES: ICD-10-CM
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
## 2023-11-10 DIAGNOSIS — F41.9 ANXIETY DISORDER, UNSPECIFIED: ICD-10-CM

## 2023-11-10 DIAGNOSIS — K21.9 GASTRO-ESOPHAGEAL REFLUX DISEASE WITHOUT ESOPHAGITIS: ICD-10-CM
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
## 2023-11-14 ENCOUNTER — APPOINTMENT (OUTPATIENT)
Dept: PULMONOLOGY | Facility: HOSPITAL | Age: 78
End: 2023-11-14

## 2023-11-15 ENCOUNTER — APPOINTMENT (OUTPATIENT)
Dept: NEUROSURGERY | Facility: CLINIC | Age: 78
End: 2023-11-15

## 2023-11-28 NOTE — H&P PST ADULT - REASON FOR ADMISSION
OP esophageal motility Eve Ingram  Gastroenterology  [address], [address]  [place] [address]  Phone: [phone]  Fax: [phone]  Follow Up Time:

## 2023-12-11 ENCOUNTER — APPOINTMENT (OUTPATIENT)
Dept: NEUROLOGY | Facility: CLINIC | Age: 78
End: 2023-12-11
Payer: MEDICARE

## 2023-12-11 ENCOUNTER — NON-APPOINTMENT (OUTPATIENT)
Age: 78
End: 2023-12-11

## 2023-12-11 VITALS
DIASTOLIC BLOOD PRESSURE: 82 MMHG | BODY MASS INDEX: 21.71 KG/M2 | SYSTOLIC BLOOD PRESSURE: 127 MMHG | HEIGHT: 62 IN | HEART RATE: 75 BPM | WEIGHT: 118 LBS

## 2023-12-11 DIAGNOSIS — R55 SYNCOPE AND COLLAPSE: ICD-10-CM

## 2023-12-11 PROCEDURE — 99205 OFFICE O/P NEW HI 60 MIN: CPT

## 2023-12-11 RX ORDER — LORAZEPAM 0.5 MG/1
0.5 TABLET ORAL
Qty: 90 | Refills: 0 | Status: DISCONTINUED | COMMUNITY
Start: 2017-01-19 | End: 2023-12-11

## 2023-12-11 RX ORDER — AMOXICILLIN AND CLAVULANATE POTASSIUM 875; 125 MG/1; MG/1
875-125 TABLET, COATED ORAL
Qty: 28 | Refills: 0 | Status: DISCONTINUED | COMMUNITY
Start: 2023-07-05 | End: 2023-12-11

## 2023-12-11 RX ORDER — CIPROFLOXACIN HYDROCHLORIDE 500 MG/1
500 TABLET, FILM COATED ORAL TWICE DAILY
Qty: 20 | Refills: 0 | Status: DISCONTINUED | COMMUNITY
Start: 2023-08-09 | End: 2023-12-11

## 2023-12-11 RX ORDER — DIAZEPAM 10 MG/1
10 TABLET ORAL
Qty: 1 | Refills: 0 | Status: DISCONTINUED | COMMUNITY
Start: 2023-09-11 | End: 2023-12-11

## 2023-12-11 RX ORDER — AZELASTINE HYDROCHLORIDE AND FLUTICASONE PROPIONATE 137; 50 UG/1; UG/1
137-50 SPRAY, METERED NASAL
Qty: 1 | Refills: 5 | Status: DISCONTINUED | COMMUNITY
Start: 2022-10-10 | End: 2023-12-11

## 2023-12-12 PROBLEM — R55 SYNCOPE: Status: ACTIVE | Noted: 2023-12-11

## 2023-12-12 RX ORDER — QUETIAPINE 25 MG/1
25 TABLET, FILM COATED ORAL
Refills: 0 | Status: ACTIVE | COMMUNITY

## 2023-12-18 ENCOUNTER — APPOINTMENT (OUTPATIENT)
Dept: NEUROSURGERY | Facility: CLINIC | Age: 78
End: 2023-12-18
Payer: MEDICARE

## 2023-12-18 VITALS — WEIGHT: 118 LBS | BODY MASS INDEX: 21.71 KG/M2 | HEIGHT: 62 IN

## 2023-12-18 PROCEDURE — 99203 OFFICE O/P NEW LOW 30 MIN: CPT

## 2023-12-18 NOTE — ASSESSMENT
[FreeTextEntry1] : 78RHF s/p fall and occipital head trauma complicated with trace SAH presented for evaluation.  Patient is following up with neurologist who requested MRI brain, CTA HN, EEG. Off Keppra.  Currently her exam generally benign except the gait instability which requires PT.   Recommendations:  Physical therapy.  No neurosurgical intervention required at this time.  Follow up with general neurology clinic.

## 2023-12-18 NOTE — PHYSICAL EXAM
[General Appearance - Alert] : alert [General Appearance - In No Acute Distress] : in no acute distress [Person] : oriented to person [Place] : oriented to place [Time] : oriented to time [Short Term Intact] : short term memory intact [Remote Intact] : remote memory intact [Span Intact] : the attention span was normal [Concentration Intact] : normal concentrating ability [Fluency] : fluency intact [Comprehension] : comprehension intact [Current Events] : adequate knowledge of current events [Past History] : adequate knowledge of personal past history [Vocabulary] : adequate range of vocabulary [Cranial Nerves Optic (II)] : visual acuity intact bilaterally,  pupils equal round and reactive to light [Cranial Nerves Oculomotor (III)] : extraocular motion intact [Cranial Nerves Trigeminal (V)] : facial sensation intact symmetrically [Cranial Nerves Facial (VII)] : face symmetrical [Cranial Nerves Vestibulocochlear (VIII)] : hearing was intact bilaterally [Cranial Nerves Glossopharyngeal (IX)] : tongue and palate midline [Cranial Nerves Accessory (XI - Cranial And Spinal)] : head turning and shoulder shrug symmetric [Cranial Nerves Hypoglossal (XII)] : there was no tongue deviation with protrusion [Motor Tone] : muscle tone was normal in all four extremities [Motor Strength] : muscle strength was normal in all four extremities [No Muscle Atrophy] : normal bulk in all four extremities [Sensation Tactile Decrease] : light touch was intact [Abnormal Walk] : normal gait [Balance] : balance was intact [2+] : Patella left 2+ [Past-pointing] : there was no past-pointing [Tremor] : no tremor present [FreeTextEntry8] : Positive Tandem gait, and Romberg.

## 2023-12-18 NOTE — REVIEW OF SYSTEMS
[As Noted in HPI] : as noted in HPI [Negative] : Heme/Lymph [Memory Lapses or Loss] : memory loss [Decr. Concentrating Ability] : decreased concentrating ability [Difficulty Walking] : difficulty walking [Ataxia] : ataxia [Fever] : no fever [Chills] : no chills [Confused or Disoriented] : no confusion [Difficulty with Language] : no ~M difficulty with language [Changed Thought Patterns] : no change in thought patterns [Repeating Questions] : no repeated questioning about recent events [Facial Weakness] : no facial weakness [Arm Weakness] : no arm weakness [Hand Weakness] : no hand weakness [Leg Weakness] : no leg weakness [Poor Coordination] : good coordination [Difficulty Writing] : no difficulty writing [Difficulties in Speech] : no speech difficulties [Numbness] : no numbness [Tingling] : no tingling [Abnormal Sensation] : no abnormal sensation [Hypersensitivity] : no hypersensitivity [Seizures] : no convulsions [Dizziness] : no dizziness [Fainting] : no fainting [Lightheadedness] : no lightheadedness [Vertigo] : no vertigo [Cluster Headache] : no cluster headache [Migraine Headache] : no migraine headache [Tension Headache] : no tension-type headache [Inability to Walk] : able to walk [Frequent Falls] : not falling [Limping] : not limping

## 2023-12-18 NOTE — HISTORY OF PRESENT ILLNESS
[FreeTextEntry1] : SAH [de-identified] : 78RHF presented as initial evaluation as post hospital follow up s/p fall and SAH.  She experiences a fall after the shower, w/o any prodrome symptoms, was down for couple of minutes, w/o post ictal. Experienced occipital head trauma.  No hx of seizure in the past.  HAd follow up with neurology office for MRI brain , CTA HN, and EEG. the test are scheduled.   reports she is mildly imbalance since the fall. Denies any other symptoms.

## 2023-12-26 ENCOUNTER — APPOINTMENT (OUTPATIENT)
Dept: PULMONOLOGY | Facility: CLINIC | Age: 78
End: 2023-12-26

## 2023-12-27 ENCOUNTER — NON-APPOINTMENT (OUTPATIENT)
Age: 78
End: 2023-12-27

## 2024-01-05 ENCOUNTER — EMERGENCY (EMERGENCY)
Facility: HOSPITAL | Age: 79
LOS: 0 days | Discharge: HOME | End: 2024-01-06
Attending: EMERGENCY MEDICINE
Payer: MEDICARE

## 2024-01-05 VITALS
HEART RATE: 84 BPM | TEMPERATURE: 98 F | DIASTOLIC BLOOD PRESSURE: 83 MMHG | HEIGHT: 62 IN | RESPIRATION RATE: 18 BRPM | OXYGEN SATURATION: 98 % | SYSTOLIC BLOOD PRESSURE: 167 MMHG | WEIGHT: 100.09 LBS

## 2024-01-05 VITALS
RESPIRATION RATE: 18 BRPM | OXYGEN SATURATION: 97 % | HEART RATE: 70 BPM | SYSTOLIC BLOOD PRESSURE: 143 MMHG | TEMPERATURE: 98 F | DIASTOLIC BLOOD PRESSURE: 76 MMHG

## 2024-01-05 DIAGNOSIS — Z90.49 ACQUIRED ABSENCE OF OTHER SPECIFIED PARTS OF DIGESTIVE TRACT: Chronic | ICD-10-CM

## 2024-01-05 DIAGNOSIS — K21.9 GASTRO-ESOPHAGEAL REFLUX DISEASE WITHOUT ESOPHAGITIS: ICD-10-CM

## 2024-01-05 DIAGNOSIS — S06.6X0A TRAUMATIC SUBARACHNOID HEMORRHAGE WITHOUT LOSS OF CONSCIOUSNESS, INITIAL ENCOUNTER: ICD-10-CM

## 2024-01-05 DIAGNOSIS — Z98.890 OTHER SPECIFIED POSTPROCEDURAL STATES: Chronic | ICD-10-CM
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
## 2024-01-05 DIAGNOSIS — Z88.8 ALLERGY STATUS TO OTHER DRUGS, MEDICAMENTS AND BIOLOGICAL SUBSTANCES: ICD-10-CM
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
## 2024-01-05 DIAGNOSIS — F41.9 ANXIETY DISORDER, UNSPECIFIED: ICD-10-CM

## 2024-01-05 DIAGNOSIS — G56.03 CARPAL TUNNEL SYNDROME, BILATERAL UPPER LIMBS: Chronic | ICD-10-CM

## 2024-01-05 LAB
ALBUMIN SERPL ELPH-MCNC: 4.3 G/DL — SIGNIFICANT CHANGE UP (ref 3.5–5.2)
ALBUMIN SERPL ELPH-MCNC: 4.3 G/DL — SIGNIFICANT CHANGE UP (ref 3.5–5.2)
ALP SERPL-CCNC: 80 U/L — SIGNIFICANT CHANGE UP (ref 30–115)
ALP SERPL-CCNC: 80 U/L — SIGNIFICANT CHANGE UP (ref 30–115)
ALT FLD-CCNC: 16 U/L — SIGNIFICANT CHANGE UP (ref 0–41)
ALT FLD-CCNC: 16 U/L — SIGNIFICANT CHANGE UP (ref 0–41)
ANION GAP SERPL CALC-SCNC: 11 MMOL/L — SIGNIFICANT CHANGE UP (ref 7–14)
ANION GAP SERPL CALC-SCNC: 11 MMOL/L — SIGNIFICANT CHANGE UP (ref 7–14)
APTT BLD: 26.9 SEC — LOW (ref 27–39.2)
APTT BLD: 26.9 SEC — LOW (ref 27–39.2)
AST SERPL-CCNC: 28 U/L — SIGNIFICANT CHANGE UP (ref 0–41)
AST SERPL-CCNC: 28 U/L — SIGNIFICANT CHANGE UP (ref 0–41)
BASOPHILS # BLD AUTO: 0.02 K/UL — SIGNIFICANT CHANGE UP (ref 0–0.2)
BASOPHILS # BLD AUTO: 0.02 K/UL — SIGNIFICANT CHANGE UP (ref 0–0.2)
BASOPHILS NFR BLD AUTO: 0.3 % — SIGNIFICANT CHANGE UP (ref 0–1)
BASOPHILS NFR BLD AUTO: 0.3 % — SIGNIFICANT CHANGE UP (ref 0–1)
BILIRUB SERPL-MCNC: 0.3 MG/DL — SIGNIFICANT CHANGE UP (ref 0.2–1.2)
BILIRUB SERPL-MCNC: 0.3 MG/DL — SIGNIFICANT CHANGE UP (ref 0.2–1.2)
BUN SERPL-MCNC: 13 MG/DL — SIGNIFICANT CHANGE UP (ref 10–20)
BUN SERPL-MCNC: 13 MG/DL — SIGNIFICANT CHANGE UP (ref 10–20)
CALCIUM SERPL-MCNC: 9.3 MG/DL — SIGNIFICANT CHANGE UP (ref 8.4–10.4)
CALCIUM SERPL-MCNC: 9.3 MG/DL — SIGNIFICANT CHANGE UP (ref 8.4–10.4)
CHLORIDE SERPL-SCNC: 105 MMOL/L — SIGNIFICANT CHANGE UP (ref 98–110)
CHLORIDE SERPL-SCNC: 105 MMOL/L — SIGNIFICANT CHANGE UP (ref 98–110)
CO2 SERPL-SCNC: 25 MMOL/L — SIGNIFICANT CHANGE UP (ref 17–32)
CO2 SERPL-SCNC: 25 MMOL/L — SIGNIFICANT CHANGE UP (ref 17–32)
CREAT SERPL-MCNC: 0.7 MG/DL — SIGNIFICANT CHANGE UP (ref 0.7–1.5)
CREAT SERPL-MCNC: 0.7 MG/DL — SIGNIFICANT CHANGE UP (ref 0.7–1.5)
EGFR: 89 ML/MIN/1.73M2 — SIGNIFICANT CHANGE UP
EGFR: 89 ML/MIN/1.73M2 — SIGNIFICANT CHANGE UP
EOSINOPHIL # BLD AUTO: 0.46 K/UL — SIGNIFICANT CHANGE UP (ref 0–0.7)
EOSINOPHIL # BLD AUTO: 0.46 K/UL — SIGNIFICANT CHANGE UP (ref 0–0.7)
EOSINOPHIL NFR BLD AUTO: 7.4 % — SIGNIFICANT CHANGE UP (ref 0–8)
EOSINOPHIL NFR BLD AUTO: 7.4 % — SIGNIFICANT CHANGE UP (ref 0–8)
GLUCOSE SERPL-MCNC: 170 MG/DL — HIGH (ref 70–99)
GLUCOSE SERPL-MCNC: 170 MG/DL — HIGH (ref 70–99)
HCT VFR BLD CALC: 36.4 % — LOW (ref 37–47)
HCT VFR BLD CALC: 36.4 % — LOW (ref 37–47)
HGB BLD-MCNC: 12 G/DL — SIGNIFICANT CHANGE UP (ref 12–16)
HGB BLD-MCNC: 12 G/DL — SIGNIFICANT CHANGE UP (ref 12–16)
IMM GRANULOCYTES NFR BLD AUTO: 0.3 % — SIGNIFICANT CHANGE UP (ref 0.1–0.3)
IMM GRANULOCYTES NFR BLD AUTO: 0.3 % — SIGNIFICANT CHANGE UP (ref 0.1–0.3)
INR BLD: 0.97 RATIO — SIGNIFICANT CHANGE UP (ref 0.65–1.3)
INR BLD: 0.97 RATIO — SIGNIFICANT CHANGE UP (ref 0.65–1.3)
LYMPHOCYTES # BLD AUTO: 2 K/UL — SIGNIFICANT CHANGE UP (ref 1.2–3.4)
LYMPHOCYTES # BLD AUTO: 2 K/UL — SIGNIFICANT CHANGE UP (ref 1.2–3.4)
LYMPHOCYTES # BLD AUTO: 32.2 % — SIGNIFICANT CHANGE UP (ref 20.5–51.1)
LYMPHOCYTES # BLD AUTO: 32.2 % — SIGNIFICANT CHANGE UP (ref 20.5–51.1)
MCHC RBC-ENTMCNC: 30.8 PG — SIGNIFICANT CHANGE UP (ref 27–31)
MCHC RBC-ENTMCNC: 30.8 PG — SIGNIFICANT CHANGE UP (ref 27–31)
MCHC RBC-ENTMCNC: 33 G/DL — SIGNIFICANT CHANGE UP (ref 32–37)
MCHC RBC-ENTMCNC: 33 G/DL — SIGNIFICANT CHANGE UP (ref 32–37)
MCV RBC AUTO: 93.6 FL — SIGNIFICANT CHANGE UP (ref 81–99)
MCV RBC AUTO: 93.6 FL — SIGNIFICANT CHANGE UP (ref 81–99)
MONOCYTES # BLD AUTO: 0.31 K/UL — SIGNIFICANT CHANGE UP (ref 0.1–0.6)
MONOCYTES # BLD AUTO: 0.31 K/UL — SIGNIFICANT CHANGE UP (ref 0.1–0.6)
MONOCYTES NFR BLD AUTO: 5 % — SIGNIFICANT CHANGE UP (ref 1.7–9.3)
MONOCYTES NFR BLD AUTO: 5 % — SIGNIFICANT CHANGE UP (ref 1.7–9.3)
NEUTROPHILS # BLD AUTO: 3.41 K/UL — SIGNIFICANT CHANGE UP (ref 1.4–6.5)
NEUTROPHILS # BLD AUTO: 3.41 K/UL — SIGNIFICANT CHANGE UP (ref 1.4–6.5)
NEUTROPHILS NFR BLD AUTO: 54.8 % — SIGNIFICANT CHANGE UP (ref 42.2–75.2)
NEUTROPHILS NFR BLD AUTO: 54.8 % — SIGNIFICANT CHANGE UP (ref 42.2–75.2)
NRBC # BLD: 0 /100 WBCS — SIGNIFICANT CHANGE UP (ref 0–0)
NRBC # BLD: 0 /100 WBCS — SIGNIFICANT CHANGE UP (ref 0–0)
PLATELET # BLD AUTO: 148 K/UL — SIGNIFICANT CHANGE UP (ref 130–400)
PLATELET # BLD AUTO: 148 K/UL — SIGNIFICANT CHANGE UP (ref 130–400)
PMV BLD: 11.2 FL — HIGH (ref 7.4–10.4)
PMV BLD: 11.2 FL — HIGH (ref 7.4–10.4)
POTASSIUM SERPL-MCNC: 4.1 MMOL/L — SIGNIFICANT CHANGE UP (ref 3.5–5)
POTASSIUM SERPL-MCNC: 4.1 MMOL/L — SIGNIFICANT CHANGE UP (ref 3.5–5)
POTASSIUM SERPL-SCNC: 4.1 MMOL/L — SIGNIFICANT CHANGE UP (ref 3.5–5)
POTASSIUM SERPL-SCNC: 4.1 MMOL/L — SIGNIFICANT CHANGE UP (ref 3.5–5)
PROT SERPL-MCNC: 6.7 G/DL — SIGNIFICANT CHANGE UP (ref 6–8)
PROT SERPL-MCNC: 6.7 G/DL — SIGNIFICANT CHANGE UP (ref 6–8)
PROTHROM AB SERPL-ACNC: 11.1 SEC — SIGNIFICANT CHANGE UP (ref 9.95–12.87)
PROTHROM AB SERPL-ACNC: 11.1 SEC — SIGNIFICANT CHANGE UP (ref 9.95–12.87)
RBC # BLD: 3.89 M/UL — LOW (ref 4.2–5.4)
RBC # BLD: 3.89 M/UL — LOW (ref 4.2–5.4)
RBC # FLD: 14.5 % — SIGNIFICANT CHANGE UP (ref 11.5–14.5)
RBC # FLD: 14.5 % — SIGNIFICANT CHANGE UP (ref 11.5–14.5)
SODIUM SERPL-SCNC: 141 MMOL/L — SIGNIFICANT CHANGE UP (ref 135–146)
SODIUM SERPL-SCNC: 141 MMOL/L — SIGNIFICANT CHANGE UP (ref 135–146)
WBC # BLD: 6.22 K/UL — SIGNIFICANT CHANGE UP (ref 4.8–10.8)
WBC # BLD: 6.22 K/UL — SIGNIFICANT CHANGE UP (ref 4.8–10.8)
WBC # FLD AUTO: 6.22 K/UL — SIGNIFICANT CHANGE UP (ref 4.8–10.8)
WBC # FLD AUTO: 6.22 K/UL — SIGNIFICANT CHANGE UP (ref 4.8–10.8)

## 2024-01-05 PROCEDURE — 70450 CT HEAD/BRAIN W/O DYE: CPT | Mod: MA

## 2024-01-05 PROCEDURE — 99284 EMERGENCY DEPT VISIT MOD MDM: CPT | Mod: 25

## 2024-01-05 PROCEDURE — 80053 COMPREHEN METABOLIC PANEL: CPT

## 2024-01-05 PROCEDURE — 99284 EMERGENCY DEPT VISIT MOD MDM: CPT

## 2024-01-05 PROCEDURE — 85025 COMPLETE CBC W/AUTO DIFF WBC: CPT

## 2024-01-05 PROCEDURE — 36415 COLL VENOUS BLD VENIPUNCTURE: CPT

## 2024-01-05 PROCEDURE — 70450 CT HEAD/BRAIN W/O DYE: CPT | Mod: 26,MA

## 2024-01-05 PROCEDURE — 85730 THROMBOPLASTIN TIME PARTIAL: CPT

## 2024-01-05 PROCEDURE — 85610 PROTHROMBIN TIME: CPT

## 2024-01-05 NOTE — CONSULT NOTE ADULT - CONSULT REASON
pt sent to ED by neurologist for eval of bleeding in brain. pt had fall in november where she was treated for same instance and was instructed to come to ED for new findings  sent by MD

## 2024-01-05 NOTE — CONSULT NOTE ADULT - SUBJECTIVE AND OBJECTIVE BOX
HPI: 78yF w/ PMHx of GERD, anxiety,  seen x 2 months ago with +HT, +loc, -ac. CTH revealed trace subarachnoid hemorrhage. Neurosurgery evaluated the patient and at that time and no surgical intervention warranted. Pt coming in today as outpt follow up with neurology warranted MRI which showed changes and prompted her arrival today. Pt does not have access to her MRI film demonstrating the findings. CTH from  ER, today 1/5 demonstrates "Mild vascular enhancement is noted which may be related to previous gadolinium administration. A 3.5 mm focal area of enhancement is noted along the falx (2/19) which appears to represent vascular enhancement. "   Pt's family has reported changes in her behavior; have noted her to be more forgetful and repeats herself more frequently but they are unable to quantify how long this has been ongoing. Patoent otherwise denies headache, vision changes (double/blurry), no acute AMS changes noted by family, weakness, numbness, paresthesias, n/v.               PAST MEDICAL & SURGICAL HISTORY:  Hypertension  Migraines  Anxiety  Vertigo  H/O gastroesophageal reflux (GERD)  Hard of hearing  uses bilateral hearing aides  Bilateral carpal tunnel syndrome repair  S/P arthroscopy of right knee  H/O arthroscopy of left knee  History of appendectomy  H/O sinus surgery          Home Medications:  LORazepam 0.5 mg oral tablet: 1 tab(s) orally 3 times a day (09 Jun 2022 09:56)  METOPROLOL SUCC ER 25 MG TAB: orally once a day TAKE 1 TABLET BY MOUTH EVERY DAY (07 Nov 2023 10:11)  QUEtiapine 25 mg oral tablet: 1 tab(s) orally once a day (in the evening) (04 Nov 2023 19:39)  sertraline 100 mg oral tablet: 1 tab(s) orally once a day (07 Nov 2023 10:11)      Allergies  chlorhexidine topical (Pruritus; Rash)    Intolerances        ROS:  [ X ] A ten-point review of systems is negative except as noted   [  ] Due to altered mental status/intubation, subjective information were not able to be obtained from the patient. History was obtained, to the extent possible, from review of the chart and collateral sources of information    MEDICATIONS  (STANDING):    MEDICATIONS  (PRN):      ICU Vital Signs Last 24 Hrs  T(C): 36.8 (05 Jan 2024 16:21), Max: 36.8 (05 Jan 2024 16:21)  T(F): 98.3 (05 Jan 2024 16:21), Max: 98.3 (05 Jan 2024 16:21)  HR: 84 (05 Jan 2024 16:21) (84 - 84)  BP: 167/83 (05 Jan 2024 16:21) (167/83 - 167/83)  BP(mean): --  ABP: --  ABP(mean): --  RR: 18 (05 Jan 2024 16:21) (18 - 18)  SpO2: 98% (05 Jan 2024 16:21) (98% - 98%)    O2 Parameters below as of 05 Jan 2024 16:21  Patient On (Oxygen Delivery Method): room air            I&O's Detail                            12.0   6.22  )-----------( 148      ( 05 Jan 2024 16:41 )             36.4     01-05    141  |  105  |  13  ----------------------------<  170<H>  4.1   |  25  |  0.7    Ca    9.3      05 Jan 2024 16:41    TPro  6.7  /  Alb  4.3  /  TBili  0.3  /  DBili  x   /  AST  28  /  ALT  16  /  AlkPhos  80  01-05        Urinalysis Basic - ( 05 Jan 2024 16:41 )    Color: x / Appearance: x / SG: x / pH: x  Gluc: 170 mg/dL / Ketone: x  / Bili: x / Urobili: x   Blood: x / Protein: x / Nitrite: x   Leuk Esterase: x / RBC: x / WBC x   Sq Epi: x / Non Sq Epi: x / Bacteria: x        On PE:    A&Ox3; Pt repeats self at times  PERRL  EOMI  No droop  tongue midline  No drift  Finger to nose intact  DING x4- good strength  SILT b/l in UE and LE       Radiology:  < from: CT Head No Cont (01.05.24 @ 17:02) >  IMPRESSION:    The previously noted hyperdensity in the posterior left temporal region   on the CT scan of 11/4/2023 has resolved on the current CT scan. The   patient's outside MRI, that may show residual changes, may be submitted   for comparison.    Mild vascular enhancement is noted which may be related to previous   gadolinium administration. A 3.5 mm focal area of enhancement is noted   along the falx (2/19) which appears to represent vascular enhancement.   Correlation with the patient's outside MRI is again recommended to   further evaluate this finding.    No evidence of mass or midline shift    --- End of Report ---    < end of copied text >      Assessment:  78F with CTH demonstrating 3.5 mm focal area of enhancement is noted along the falx (2/19) which appears to represent vascular enhancement x2 months s/p fall.     Plan:  - No acute neurosurgical intervention needed at this time   - Repeat CTH x 6 hours  - Raise HOB 30 degrees  - Correlate changes with outpatient MRI   - Neurology consult   - Care per primary team   - Discussed case and imaging with attending Dr Major

## 2024-01-05 NOTE — ED ADULT NURSE NOTE - CHIEF COMPLAINT QUOTE
pt sent to ED by neurologist for eval of bleeding in brain. pt had fall in november where she was treated for same instance and was instructed to come to ED for new findings

## 2024-01-05 NOTE — ED PROVIDER NOTE - CLINICAL SUMMARY MEDICAL DECISION MAKING FREE TEXT BOX
Patient signed out to me from Dr. Brown.  Labs imaging reviewed.  Cleared by neurosurgery.  Aware of all results, given a copy of all available results, comfortable with discharge and follow-up outpatient, strict return precautions given. Endorses understanding and aware they can return at any time for further evaluation. No further questions or concerns at this time.

## 2024-01-05 NOTE — ED PROVIDER NOTE - PATIENT'S PREFERRED PRONOUN
Patient called said she has a cold but is having issues with breathing at night. She would like a call back to discuss.    Her/She

## 2024-01-05 NOTE — ED PROVIDER NOTE - NSFOLLOWUPINSTRUCTIONS_ED_ALL_ED_FT
Medical Clearance    You were seen in the Emergency Department for an urgent issue.  A medical screening exam has been done. This exam is meant to determine whether you need emergency treatment. Your exam has not demonstrated the need for emergency treatment at this point. It is safe for you to go to your caregiver's office or clinic for further evaluation and/or treatment. You should make an appointment to see your caregiver as soon as he or she is available. Please return to the Emergency Department for any further issues.     Follow up with your neurologist.

## 2024-01-05 NOTE — ED PROVIDER NOTE - OBJECTIVE STATEMENT
78yoF PMHx GERD, anxiety and traumatic subarachnoid hemorrhage 2 months ago after a fall in her bathroom, presenting for concern of worsening intracranial bleed. Patient had an outpatient MRI showing possible new vascular enhancement and was referred to the ED by her neurologist. Family report patient has also been increasingly forgetful, and confused. She denies any headache, vision changes, nausea, numbness, weakness, tingling

## 2024-01-05 NOTE — ED ADULT TRIAGE NOTE - PRO INTERPRETER NEED 2
Medication: carvedilol  Last office visit date: 6/20/23 Dr Arnold  Establish care:  10/31/23 Dr Rodríguez  Medication Refill Protocol Failed.  Protocol approved due to: data identified in chart review.  Seen by prescribing provider or same department within the last 12 months or has a future appt. Patient saw Dr Arnold within 12 months, sees Dr Rodríguez this month.    90 day supply refilled per protocol under Dr Noguera on call provider   English

## 2024-01-05 NOTE — ED PROVIDER NOTE - PATIENT PORTAL LINK FT
You can access the FollowMyHealth Patient Portal offered by [facility] by registering at the following website: [email or web address]. By joining [employer]’s FollowMyHealth portal, you will also be able to view your health information using other applications (apps) compatible with our system. You can access the FollowMyHealth Patient Portal offered by [facility] by registering at the following website: [email or web address]. By joining [employer]’s FollowMyHealth portal, you will also be able to view your health information using other applications (apps) compatible with our system.

## 2024-01-05 NOTE — ED ADULT NURSE NOTE - OBJECTIVE STATEMENT
Pt sent to ed by pmd for brain bleed. Pt fell on nov 4 & had bleed. Had CT & MRI today that showed a new or worsneing bleed.

## 2024-01-05 NOTE — ED PROVIDER NOTE - PROGRESS NOTE DETAILS
CTH performed, pending reads, neurosurgery aware CG: rpt CT head negative, neurosurgery rec outpatient f/u with neurologist. Pt has upcoming appointment scheduled with her neurologist. TN - pt well in stretcher; nonfocal neuro exam; all results explained to pt and  w/ read back; pt w/ appt "in the next 5 d"

## 2024-01-05 NOTE — ED ADULT NURSE NOTE - NSFALLUNIVINTERV_ED_ALL_ED
Bed/Stretcher in lowest position, wheels locked, appropriate side rails in place/Call bell, personal items and telephone in reach/Instruct patient to call for assistance before getting out of bed/chair/stretcher/Non-slip footwear applied when patient is off stretcher/[place] to call system/Physically safe environment - no spills, clutter or unnecessary equipment/Purposeful proactive rounding/Room/bathroom lighting operational, light cord in reach Bed/Stretcher in lowest position, wheels locked, appropriate side rails in place/Call bell, personal items and telephone in reach/Instruct patient to call for assistance before getting out of bed/chair/stretcher/Non-slip footwear applied when patient is off stretcher/[place] to call system/Physically safe environment - no spills, clutter or unnecessary equipment/Purposeful proactive rounding/Room/bathroom lighting operational, light cord in reach

## 2024-01-05 NOTE — ED PROVIDER NOTE - ATTENDING CONTRIBUTION TO CARE
78-year-old female presented today with intracranial hemorrhage noted on MRI that was performed outpatient.  Patient was instructed to come in by her neurologist.  Patient has no symptomatology, GCS 15.  Patient is neurologically tact at this time.  Patient noted to have irregular CAT scan findings and neurosurgery was consulted who evaluated patient and recommended repeat CT head in 6 hours for disposition determination.  Patient signed out pending repeat CT head.    CONSTITUTIONAL: Well-developed; well-nourished; in no acute distress.   SKIN: warm, dry  HEAD: Normocephalic; atraumatic.  EYES: PERRL, EOMI, normal sclera and conjunctiva   ENT: No nasal discharge; airway clear.  NECK: Supple; non tender.  CARD: S1, S2 normal; no murmurs, gallops, or rubs. Regular rate and rhythm.   RESP: No wheezes, rales or rhonchi.  ABD: soft ntnd  EXT: Normal ROM.  No clubbing, cyanosis or edema.   LYMPH: No acute cervical adenopathy.  NEURO: Alert, oriented, grossly unremarkable  PSYCH: Cooperative, appropriate. 78-year-old female presented today with intracranial hemorrhage noted on MRI that was performed outpatient.  Patient was instructed to come in by her neurologist.  Patient has no symptomatology, GCS 15.  Patient is neurologically tact at this time.  Patient noted to have irregular CAT scan findings and neurosurgery was consulted who evaluated patient and recommended repeat CT head in 6 hours for disposition determination.  Patient signed out pending repeat CT head..    CONSTITUTIONAL: Well-developed; well-nourished; in no acute distress.   SKIN: warm, dry  HEAD: Normocephalic; atraumatic.  EYES: PERRL, EOMI, normal sclera and conjunctiva   ENT: No nasal discharge; airway clear.  NECK: Supple; non tender.  CARD: S1, S2 normal; no murmurs, gallops, or rubs. Regular rate and rhythm.   RESP: No wheezes, rales or rhonchi.  ABD: soft ntnd  EXT: Normal ROM.  No clubbing, cyanosis or edema.   LYMPH: No acute cervical adenopathy.  NEURO: Alert, oriented, grossly unremarkable  PSYCH: Cooperative, appropriate.

## 2024-01-06 PROCEDURE — 70450 CT HEAD/BRAIN W/O DYE: CPT | Mod: 26,MA

## 2024-01-09 ENCOUNTER — NON-APPOINTMENT (OUTPATIENT)
Age: 79
End: 2024-01-09

## 2024-01-12 ENCOUNTER — APPOINTMENT (OUTPATIENT)
Age: 79
End: 2024-01-12
Payer: MEDICARE

## 2024-01-12 PROCEDURE — 95816 EEG AWAKE AND DROWSY: CPT

## 2024-01-13 PROCEDURE — 95708 EEG WO VID EA 12-26HR UNMNTR: CPT

## 2024-01-14 PROCEDURE — 95708 EEG WO VID EA 12-26HR UNMNTR: CPT

## 2024-01-15 PROCEDURE — 95700 EEG CONT REC W/VID EEG TECH: CPT

## 2024-01-15 PROCEDURE — 95708 EEG WO VID EA 12-26HR UNMNTR: CPT

## 2024-01-15 PROCEDURE — 95723 EEG PHY/QHP>60<84 HR W/O VID: CPT

## 2024-01-19 ENCOUNTER — APPOINTMENT (OUTPATIENT)
Dept: NEUROLOGY | Facility: CLINIC | Age: 79
End: 2024-01-19
Payer: MEDICARE

## 2024-01-19 VITALS
SYSTOLIC BLOOD PRESSURE: 117 MMHG | WEIGHT: 120 LBS | BODY MASS INDEX: 22.08 KG/M2 | HEIGHT: 62 IN | OXYGEN SATURATION: 100 % | HEART RATE: 74 BPM | DIASTOLIC BLOOD PRESSURE: 74 MMHG

## 2024-01-19 DIAGNOSIS — G31.9 DEGENERATIVE DISEASE OF NERVOUS SYSTEM, UNSPECIFIED: ICD-10-CM

## 2024-01-19 DIAGNOSIS — G31.84 MILD COGNITIVE IMPAIRMENT, SO STATED: ICD-10-CM

## 2024-01-19 DIAGNOSIS — R63.4 ABNORMAL WEIGHT LOSS: ICD-10-CM

## 2024-01-19 DIAGNOSIS — S06.6X9A TRAUMATIC SUBARACHNOID HEMORRHAGE WITH LOSS OF CONSCIOUSNESS OF UNSPECIFIED DURATION, INITIAL ENCOUNTER: ICD-10-CM

## 2024-01-19 PROCEDURE — 99215 OFFICE O/P EST HI 40 MIN: CPT

## 2024-01-22 PROBLEM — G31.9 CEREBRAL ATROPHY: Status: ACTIVE | Noted: 2022-12-21

## 2024-01-22 PROBLEM — G31.84 MILD COGNITIVE IMPAIRMENT: Status: ACTIVE | Noted: 2022-07-21

## 2024-01-22 PROBLEM — S06.6X9A SUBARACHNOID HEMORRHAGE FOLLOWING INJURY, WITH LOSS OF CONSCIOUSNESS, INITIAL ENCOUNTER: Status: ACTIVE | Noted: 2023-12-18

## 2024-01-22 PROBLEM — R63.4 WEIGHT LOSS: Status: ACTIVE | Noted: 2023-04-14

## 2024-01-22 NOTE — ASSESSMENT
[FreeTextEntry1] : 77 y/o woman with recent fall resulting in traumatic SAH. No concerning vascular lesions identified. 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 Recent follow-up imaging showing equivocal findings (MRI brain 1/5 showed R SDH; CTH 1/6 showed no bleed). Given current exam, likely artifactual findings; however will obtain repeat imaging to confirm. Patient has symptoms c/f vascular dementia which require further evaluation. Family expressed concern that her dementia referral is for next year (2025). As such, will refer patient to Dr Pardo's clinic in the interim for workup. Patient/ report that although her current medications are greatly helping with the AH/VH, due to night sweats, her quality of sleep has decreased. I expressed that they should continue to discuss with her OP psychiatrist regarding optimal regimen.    - Obtain interval CTH in 1 month to reassess for bleed (MRI brain 1/5 showed R SDH; CTH 1/6 showed no bleed) - Will refer to Dr Pardo for vascular dementia workup - Will arrange to have clinic note sent to patient's psychiatrist ([phone]) to discuss medication regimen

## 2024-01-22 NOTE — PHYSICAL EXAM
[FreeTextEntry1] : AAOx3, NAD; unable to do serial 7s CN 2-12 normal No AH/VH No aphasia or dysarthria Strength 5/5 throughout; mild tremor in bilateral LEs on ambulation; otherwise narrow based gait Normal sensation Decreased bulk/normal tone

## 2024-01-22 NOTE — DATA REVIEWED
[de-identified] : December 2022: PET CT scan of the brain: significant cortical volume loss disproportionate for her typical age. This is not typical of Alzheimer's disease or frontotemporal dementia. [de-identified] : August 2022: Neuropsych evaluation: Neurocognitive disorder, amnestic type. Vascular etiology, suspect VD vs. AD

## 2024-01-23 ENCOUNTER — NON-APPOINTMENT (OUTPATIENT)
Age: 79
End: 2024-01-23

## 2024-02-26 NOTE — ED PROVIDER NOTE - CLINICAL SUMMARY MEDICAL DECISION MAKING FREE TEXT BOX
Left a message for patient to call back to schedule colonoscopy/EGD   75yo F history of HTN vertigo migraines presenting with dizziness, room spinning sensation yesterday that self resolved. No fall/trauma. Went to urgent care today who recommended she come in for further eval. No sx since last night. Has prescription for meclizine already. No headache vision changes f/c/n/v/d, neck pain/stiffness, chest pain, shortness of breath. pt continuously asymptomatic in ED. has ENT and neuro follow up outpatient, requesting discharge and outpatient follow-up. Aware of all results, given a copy of all available results, comfortable with discharge and follow-up outpatient, strict return precautions given. Endorses understanding of all of this and aware that they can return at any time for new or concerning symptoms. No further questions or concerns at this time

## 2024-03-01 NOTE — ED ADULT TRIAGE NOTE - CHIEF COMPLAINT QUOTE
[facility])  2022-02: Cerebral atrophy  ([facility])  2020-10: Orthostatic hypotension  2020-10: Recurrent major depressive disorder, in full remission ([facility])  [facility])  [facility])  2019-10: Leukopenia  2019-10: Acute kidney injury ([facility])  2019-10: Hypothyroidism  2019-10: Hypertensive urgency  2019-08: GERD (gastroesophageal reflux disease)  2019-07: Paroxysmal atrial fibrillation ([facility])  [facility])  Heladio Ramos M.D.;  Location: [facility];  Service:     TRIGGER FINGER RELEASE Right 7/25/2016    Procedure: TRIGGER FINGER RELEASE - RING;  Surgeon: Heladio Ramos M.D.;  Location: [facility];  Service:     CARPAL TUNNEL RELEASE Right 7/25/2016    Procedure: CARPAL TUNNEL RELEASE;  Surgeon: Heladio Ramos M.D.;  Location: [facility]  "ORS;  Service:     CARPAL TUNNEL RELEASE Left 5/25/2016    Procedure: CARPAL TUNNEL RELEASE;  Surgeon: Heladio Ramos M.D.;  Location: SURGERY [facility];  10/04/2019 0316    TRIGLYCERIDE 49 10/04/2019 0316    HDL 74 10/04/2019 0316    LDL 76 10/04/2019 0316       [facility] [facility] endocrinologist Ab Vicente MD, FACE, ECNU      CC:   Yoni Benito III, M.D.     pt sent to ED by neurologist for eval of bleeding in brain. pt had fall in november where she was treated for same instance and was instructed to come to ED for new findings

## 2024-03-02 ENCOUNTER — OUTPATIENT (OUTPATIENT)
Dept: OUTPATIENT SERVICES | Facility: HOSPITAL | Age: 79
LOS: 1 days | End: 2024-03-02
Payer: MEDICARE

## 2024-03-02 DIAGNOSIS — Z98.890 OTHER SPECIFIED POSTPROCEDURAL STATES: Chronic | ICD-10-CM

## 2024-03-02 DIAGNOSIS — G56.03 CARPAL TUNNEL SYNDROME, BILATERAL UPPER LIMBS: Chronic | ICD-10-CM

## 2024-03-02 DIAGNOSIS — Z90.49 ACQUIRED ABSENCE OF OTHER SPECIFIED PARTS OF DIGESTIVE TRACT: Chronic | ICD-10-CM

## 2024-03-02 DIAGNOSIS — R51.9 HEADACHE, UNSPECIFIED: ICD-10-CM

## 2024-03-02 DIAGNOSIS — Z00.8 ENCOUNTER FOR OTHER GENERAL EXAMINATION: ICD-10-CM

## 2024-03-02 PROCEDURE — 70450 CT HEAD/BRAIN W/O DYE: CPT

## 2024-03-02 PROCEDURE — 70450 CT HEAD/BRAIN W/O DYE: CPT | Mod: 26

## 2024-03-03 DIAGNOSIS — R51.9 HEADACHE, UNSPECIFIED: ICD-10-CM

## 2024-03-26 ENCOUNTER — APPOINTMENT (OUTPATIENT)
Dept: NEUROLOGY | Facility: CLINIC | Age: 79
End: 2024-03-26

## 2024-04-01 ENCOUNTER — APPOINTMENT (OUTPATIENT)
Dept: NEUROLOGY | Facility: CLINIC | Age: 79
End: 2024-04-01
Payer: MEDICARE

## 2024-04-01 VITALS
DIASTOLIC BLOOD PRESSURE: 62 MMHG | HEIGHT: 62 IN | HEART RATE: 77 BPM | WEIGHT: 117 LBS | SYSTOLIC BLOOD PRESSURE: 112 MMHG | BODY MASS INDEX: 21.53 KG/M2

## 2024-04-01 DIAGNOSIS — F01.50 VASCULAR DEMENTIA W/OUT BEHAVIORAL DISTURBANCE: ICD-10-CM

## 2024-04-01 PROCEDURE — 99215 OFFICE O/P EST HI 40 MIN: CPT

## 2024-04-01 RX ORDER — DONEPEZIL HYDROCHLORIDE 5 MG/1
5 TABLET ORAL
Qty: 30 | Refills: 5 | Status: ACTIVE | COMMUNITY
Start: 2024-04-01 | End: 1900-01-01

## 2024-04-01 NOTE — ASSESSMENT
[FreeTextEntry1] : Ms. WALKER 78 year old female presents today as a follow up for dementia.  The differential was suspected to be vascular dementia vs Alzheimers disease.  Plan:  -Start Aricept 5mg QD -Monitor Vascular risk factors -Establish care with Dr. Camacho -Encouraged exercise daily for atleast 10 minutes per day

## 2024-04-01 NOTE — HISTORY OF PRESENT ILLNESS
[FreeTextEntry1] : Since last visit she has had visits to the hospital after a fall where she hit her head and was found to have a SDH.  She continues to have memory issues and  says she forgets things.  She has made mistakes with cooking so  cooks now.  She doesnt pay bills, she is afraid to shower because of her falls.  She also will forget medicines unless her  reminds her.  Has an Aunt with Dementia (fathers sister).  Vert Dissection is chronic as per family and notes.   Ms. WALKER 78 year old female presents today post hospitalization for syncope. She had a spontaneous fall with LOC. Dx with acute subarachnoid hemorrhage in l posterior temporal region, was started on Keppra in the hospital and was advised to take x1 week and f/u with neurosurgery OP.  Has a f/u with neurosurgery this week.   In regard to her syncope, the family thinks it was a spontaneous fall and not due to tripping. They said that it didn't look like she tried to catch herself on the scene. Prior to this had some imbalance issues and  would have to assist her ambulation. Denies dizziness, denies any recurrence of falls.   11/4/23: CT HEAD: Trace acute subarachnoid hemorrhage within the left posterior temporal region. No mass effect or midline shift. CT CERVICAL SPINE: No acute fracture or subluxation. _____________________________________________________________________________________________ Of note patient has been followed by psychiatry following diagnosis of dementia. Psychiatrist is concerned about presentation of her dementia given frequent falls. visual hallucinations, auditory hallucinations. She has been followed by Dr. Greenberg in the past for memory disturbance. Family noticed her symptoms started approximately 1 year ago but has worsened since the summer time.  Daughter reports short term memory is an issue. She often repeats herself or repeats questions. Long term memory is intact. ADLS are with some assistance d/t mobility. No gait shuffling or muscle rigidity. Hallucinations are occurring almost daily. No drooling, no staring into space. No incontinence. Had a few episodes where she couldn't recognize her . This happened about 3 times and one of the instances it was suspected to be due to a UTI. Has mood swings and increasing agitation. She takes Seroquel nightly, prior to this was taking lorazepam which she is no longer taking.  feels Seroquel is helping her, psychiatrist has been decreasing her dose due to night sweats.  December 2022: PET CT scan of the brain: significant cortical volume loss disproportionate for her typical age. This is not typical of Alzheimer's disease or frontotemporal dementia. August 2022: Neuropsych evaluation: Neurocognitive disorder, amnestic type. Vascular etiology, suspect VD vs. AD

## 2024-04-01 NOTE — PHYSICAL EXAM
[FreeTextEntry1] : Mental status: Awake, alert and oriented to self, birthday, place, reason for visit. Unable to tell year, Month that yesterday was Easter or name the POTUS. Is able to do 5+2, but cannot do 11-4. Unable to correctly place her left thumb over right ear. No dysarthria, no aphasia.    Cranial nerves: Pupils equally round and reactive to light, visual fields full, no nystagmus, extraocular muscles intact, V1 through V3 intact bilaterally and symmetric, face symmetric, hearing intact to finger rub, palate elevation symmetric, tongue was midline.  Motor:  MRC grading 5/5 b/l UE/LE.   strength 5/5.  Normal tone and bulk.  No abnormal movements. No muscle rigidity, no bradykinesias.   Sensation: Intact to light touch temperature, and vibration.   Coordination: No dysmetria on finger-to-nose.   Reflexes: 2+ in bilateral UE/LE.  Gait: Narrow and mildly unsteady. No ataxia.    Teller 9/30

## 2024-04-01 NOTE — REVIEW OF SYSTEMS
"Chief Complaint   Patient presents with    Vaginal Pain       HPI:  Catie Monge is a 36 y.o. female patient  who presents today for evaluation of pelvic pain / dyspareunia.  For the past month, she describes having a sharp LLQ pain with IC.  Shortly after IC, the pain resolves.  She does not have this pelvic pain at any other time than with IC.  Many years ago, she had ovarian cysts.  She has been on OCPs for many years, stopping 2020.  Denies diarrhea / constipation.  No bladder complaints.  Also, she has noted some skin changes as well as difficulty with weight loss.  Patient's last menstrual period was 09/10/2022 (exact date).     Blood pressure 114/74, height 5' 9" (1.753 m), weight 70.6 kg (155 lb 10.3 oz), last menstrual period 09/10/2022.      UPT today: Negative      Past Medical History:   Diagnosis Date    Allergy     Anxiety     No meds    Fever blister     Oral cold sores only; no hx genital outbreak    Postpartum hypertension     Requiring magnesium after delivery       Past Surgical History:   Procedure Laterality Date    ANUS SURGERY      FISSURE    COSMETIC SURGERY      DILATION AND CURETTAGE OF UTERUS USING SUCTION N/A 2018    Procedure: DILATION AND CURETTAGE, UTERUS, USING SUCTION;  Surgeon: Joselo Landry Jr., MD;  Location: [facility];  Service: OB/GYN;  Laterality: N/A;    NASAL SEPTUM SURGERY      TONSILLECTOMY, ADENOIDECTOMY      WISDOM TOOTH EXTRACTION           ROS:  GENERAL: Feeling well overall.   SKIN: Denies rash or lesions.   HEAD: Denies head injury or headache.   NODES: Denies enlarged lymph nodes.   CHEST: Denies chest pain or shortness of breath.   CARDIOVASCULAR: Denies palpitations or left sided chest pain.   ABDOMEN: Reports LLQ abdominal pain with IC.  No nausea, vomiting or rectal bleeding.   URINARY: No dysuria or hematuria.  REPRODUCTIVE: See HPI.   BREASTS: Denies pain, lumps, or nipple discharge.   HEMATOLOGIC: No easy bruisability or excessive " bleeding.   MUSCULOSKELETAL: Denies joint pain or swelling.   NEUROLOGIC: Denies syncope or weakness.   PSYCHIATRIC: Denies depression.    PE:   (chaperone present during entire exam)  APPEARANCE: Well nourished, well developed, in no acute distress.  ABDOMEN: Soft. Minimal tenderness to deep palpation LLQ.  No guarding.  No rebound.  VULVA: No lesions. Normal female genitalia.  URETHRAL MEATUS: Normal size and location, no lesions, no prolapse.  URETHRA: No masses, tenderness, prolapse or scarring.  VAGINA: Moist and well rugated, no abnormal discharge, no significant cystocele or rectocele.  CERVIX: No lesions and discharge.  No CMT.  UTERUS: Normal size, regular shape, mobile, non-tender, bladder base nontender.  ADNEXA: Tenderness on left - similar to pain with IC, ?fullness on left.  ANUS PERINEUM: Normal.    Diagnosis:  1. Pelvic pain    2. Perimenopausal symptoms          PLAN:    Orders Placed This Encounter    C. trachomatis/N. gonorrhoeae by AMP DNA [facility]; Cervix    Urine culture    US Pelvis Comp with Transvag NON-OB (xpd    Follicle Stimulating Hormone    Luteinizing Hormone    Estradiol    Urinalysis    POCT Urine Pregnancy       Patient was counseled today on her dyspareunia and the various etiologies.  On BME, she had tenderness in LLQ which was similar to her discomfort with IC.  UPT today was negative.  She will have a pelvic ultrasound performed to exclude adnexal pathology.  Urine will be sent to exclude an infectious etiology.  We also discussed her weight / skin changes for which she will have hormone levels checked.    Follow-up after testing.    I spent a total of 30 minutes on the day of the visit.This includes face to face time and non-face to face time preparing to see the patient (eg, review of tests), Obtaining and/or reviewing separately obtained history, Documenting clinical information in the electronic or other health record, Independently interpreting resultsand communicating  results to the patient/family/caregiver, or Care coordination.      Answers submitted by the patient for this visit:  Pelvic Pain Questionnaire (Submitted on 2022)  Chief Complaint: Pelvic pain  Chronicity: new  Onset: 1 to 4 weeks ago  Frequency: intermittently  Progression since onset: unchanged  Pain severity: moderate  Affected side: left  Pregnant now?: No  abdominal pain: Yes  anorexia: No  back pain: No  chills: No  constipation: No  diarrhea: No  discolored urine: No  dysuria: No  fever: No  flank pain: No  frequency: No  headaches: No  hematuria: No  nausea: No  painful intercourse: Yes  rash: No  urgency: No  vomiting: No  Aggravated by: intercourse  treatments tried: nothing  Sexual activity: sexually active  Partner with STD symptoms: no  Birth control: vasectomy  Menstrual history: regular  STD: No  abdominal surgery: No   section: No  Ectopic pregnancy: No  Endometriosis: No  herpes simplex: No  gynecological surgery: No  menorrhagia: No  metrorrhagia: No  miscarriage: Yes  ovarian cysts: Yes  perineal abscess: No  PID: No  terminated pregnancy: No  vaginosis: No     [As Noted in HPI] : as noted in HPI [Negative] : Heme/Lymph

## 2024-05-06 NOTE — ASU PREOP CHECKLIST - NSWEIGHTCALCTOOLDRUG_GEN_A_CORE
[facility]  Progress Note    CHIEF COMPLAINT:    Chief Complaint   Patient presents with    Back Pain     Patient is back for mid and low back pain. States her midback pain is worse than her low back. Norco 1 tab daily. Taking flexeril nightly prn but she wakes up very sleepy. Pain 8/10 after taking norco 2 hours ago. States her R arm and L leg go numb when she is standing for too long. PT in the past with minimal improvement. No ESIs for mid or low back.        History of Present Illness:  Jewels Almanzar is a 57 year old female who is being seen for consultation at the request of Dr. Fair.  I saw her in 2019 for thoracic pain and chronic pain syndrome.  I started her on Cymbalta but she did not return.  She has a history of treatment for fibromyalgia by Dr. Mathew at [place].  She now sees Dr. Gates and has a recent positive KEILA and possible polymyalgia rheumatica.  I also ordered a thoracic MRI at  the time which was normal.  Today she complains of widespread body pain, her history is quite nebulous and disjointed.  She tells me she has post-COVID symptoms.  In terms of complaints that I can address, she has midthoracic pain.  No radiation around the chest wall.  She has right arm and left leg symptoms, she has had extensive physical therapy, has tried innumerable chronic pain medications that give her side effects.    PAST MEDICAL HISTORY:  Past Medical History:    Allergic rhinitis    Anxiety state, unspecified    Bowel habit changes    colonoscopy 2010    Cholelithiasis    cholecystectomy 2009    COVID-19    Diabetes (HCC)    Disorder of thyroid    Endometriosis    Endometriosis/bleeding, Total Hysterectomy    Esophageal reflux    Fibromyalgia    High blood pressure    Hyperthyroidism    Hypothyroidism    Lipid screening    per NG    Obesity, unspecified    TMJ (temporomandibular joint disorder)    \"TMJ\"    Tonsillitis    tonsillectomy 1979    Unspecified sleep  apnea    no cpap use       SURGICAL HISTORY:  Past Surgical History:   Procedure Laterality Date    Appendectomy      Appendectomy      Cholecystectomy      Colonoscopy  2010    per NG    Colonoscopy  2014    Colonoscopy N/A 2021    Procedure: COLONOSCOPY;  Surgeon: Cyril Betts MD;  Location: [facility] ENDOSCOPY    Hysterectomy      total    Hysterectomy      Needle biopsy left      Benign      1992,93    Other surgical history      endiometrosis    Tonsillectomy      Tonsillectomy      Total abdom hysterectomy         SOCIAL HISTORY:   Social History     Occupational History    Not on file   Tobacco Use    Smoking status: Never    Smokeless tobacco: Never   Vaping Use    Vaping status: Never Used   Substance and Sexual Activity    Alcohol use: Never     Alcohol/week: 0.0 standard drinks of alcohol    Drug use: No    Sexual activity: Not on file       CURRENT MEDICATIONS:   Current Outpatient Medications   Medication Sig Dispense Refill    cyclobenzaprine 5 MG Oral Tab Take 1 tablet (5 mg total) by mouth 2 (two) times daily as needed for Muscle spasms. 60 tablet 0    metoprolol succinate ER 25 MG Oral Tablet 24 Hr TAKE 1 TABLET BY MOUTH DAILY AT BEDTIME 90 tablet 1    valsartan 80 MG Oral Tab Take 1 tablet (80 mg total) by mouth daily. 90 tablet 1    DICYCLOMINE 10 MG Oral Cap TAKE 1 CAPSULE BY MOUTH THREE TIMES DAILY AS NEEDED 30 capsule 0    Glucose Blood (ONETOUCH ULTRA) In Vitro Strip 1 each by Other route daily. 100 strip 1    butalbital-acetaminophen-caffeine -40 MG Oral Tab TAKE 1 TABLET BY MOUTH EVERY 6 HOURS AS NEEDED FOR PAIN 30 tablet 1    empagliflozin (JARDIANCE) 10 MG Oral Tab Take 1 tablet (10 mg total) by mouth daily. 90 tablet 1    JANUVIA 100 MG Oral Tab TAKE 1 TABLET(100 MG) BY MOUTH DAILY 90 tablet 1    LEVOTHYROXINE 125 MCG Oral Tab TAKE 1 TABLET(125 MCG) BY MOUTH BEFORE BREAKFAST 90 tablet 1    HYDROcodone-acetaminophen 5-325 MG Oral Tab  Take 1 tablet by mouth 2 (two) times daily as needed for Pain. 45 tablet 0    clonazePAM 0.5 MG Oral Tab Take 1 tablet (0.5 mg total) by mouth nightly as needed (insomnia). 30 tablet 3    omeprazole 20 MG Oral Capsule Delayed Release Take 1 capsule (20 mg total) by mouth every morning. 90 capsule 1    ERGOCALCIFEROL 1.25 MG (84079 UT) Oral Cap TAKE 1 CAPSULE BY MOUTH 1 TIME A WEEK (Patient not taking: Reported on 4/16/2024) 12 capsule 0    Lancets (ONETOUCH DELICA PLUS [id number]) Does not apply Misc 1 lancet by Finger stick route daily. 100 each 3    Clobetasol Propionate 0.05 % External Ointment       Insulin Pen Needle (PEN NEEDLES) 32G X 4 MM Does not apply Misc 1 each by Does not apply route every 7 days. 30 each 2    lidocaine-menthol 4-1 % External Patch Place 1 patch onto the skin daily as needed. (Patient not taking: Reported on 4/16/2024) 7 patch 0    Fluticasone Propionate 50 MCG/ACT Nasal Suspension 1 PUFF EACH NOSTRIL 2 TIMES A  DAY 1 Bottle 3    Cranberry 250 MG Oral Tab Take 2 tablets by mouth daily as needed.         ALLERGIES:   Allergies   Allergen Reactions    Morphine PAIN     migraine    Prochlorperazine JITTERY     Compazine    Prochlorperazine Edisylate JITTERY    Sumatriptan FATIGUE     Left side weakness  Left side weakness  Other reaction(s): weakness             PHYSICAL EXAM:   Pulse 100   Wt 185 lb (83.9 kg)   LMP  (LMP Unknown)   SpO2 99%   BMI 33.84 kg/m²     Body mass index is 33.84 kg/m².      General: No immediate distress  Head: Normocephalic/ Atraumatic  Extremities: No lower extremity edema bilaterally. Peripheral pulses intact.   Spine: Painful spinal ROM in all directions, positive percussion over the thoracic region  Hips: full and painfree ROM   Neuro:   Cognition: alert & oriented x 3, attentive, able to follow 2 step commands, comprehention intact, spontaneous speech intact  Strength: Lower extremities have 5/5 strength  Sensation: Normal lower extremities  Reflexes:  Normal lower extremities  SLR: neg    Data    Radiology Imaging:  I personally reviewed plain film x-rays of the pelvis showing sacroiliac arthritis without joint destruction.  Visualized hip joints are normal.  Visualized lower lumbar spine is unremarkable.  A DEXA scan from November 2023 shows osteopenia of the left femoral neck and lumbar spine  3.  A recent brain MRI shows no evidence of MS    ASSESSMENT AND PLAN:  1. Chronic bilateral thoracic back pain  The patient has chronic thoracic pain, she has osteopenia on recent DEXA scan, she has pain to percussion over the mid thoracic spine.  I think a repeat lumbar MRI looking for acute compression fracture would be most helpful.  - MRI SPINE THORACIC (CPT=72146); Future    2. Fibromyalgia  Per Dr. Gates    3. Chronic pain syndrome  Recalcitrant    4. PMR (polymyalgia rheumatica) (HCC)  Per Dr. Gates    5. Type 2 diabetes mellitus without complication, without long-term current use of insulin (HCC)  Avoiding steroids, limits treatment options    6. Anxiety and depression  Negative comorbidity for painful conditions    7. Attention deficit disorder (ADD) without hyperactivity  Difficult historian        RTC: For MRI review      The patient was in agreement with the assessment and plan.  All questions were answered.        Geoff Hernandez MD  Physical Medicine and Rehabilitation/Sports Medicine  [facility]      used

## 2024-07-10 NOTE — ASSESSMENT
[FreeTextEntry1] : Esophagram reviewed\par Counseled Shana that the esophagram findings are suggestive of poor esophageal coordination and, while this is rarely dangerous, it can be difficult to treat\par Asked her to continue to follow with psychiatrist for significant emotional distress regarding her symptoms\par Referral for GI and SLP\par Continue to follow with Dr. Fernando for any sinonasal issues, I will see her back after gastroenterology opinion
Show Aperture Variable?: Yes
Consent: The patient's consent was obtained verbally including but not limited to risks of crusting, scabbing, blistering, scarring, darker or lighter pigmentary change, recurrence, incomplete removal and infection.
Render Post-Care Instructions In Note?: no
Application Tool (Optional): Liquid Nitrogen Sprayer
Duration Of Freeze Thaw-Cycle (Seconds): 3
Post-Care Instructions: I reviewed with the patient in detail post-care instructions. Patient is to wear sunprotection, and avoid picking at any of the treated lesions. Pt may apply Vaseline to crusted or scabbing areas.
Number Of Freeze-Thaw Cycles: 1 freeze-thaw cycle
Detail Level: Detailed

## 2024-08-29 ENCOUNTER — APPOINTMENT (OUTPATIENT)
Dept: NEUROLOGY | Facility: CLINIC | Age: 79
End: 2024-08-29

## 2024-09-09 ENCOUNTER — APPOINTMENT (OUTPATIENT)
Dept: NEUROLOGY | Facility: CLINIC | Age: 79
End: 2024-09-09
Payer: MEDICARE

## 2024-09-09 VITALS
DIASTOLIC BLOOD PRESSURE: 77 MMHG | SYSTOLIC BLOOD PRESSURE: 125 MMHG | OXYGEN SATURATION: 98 % | TEMPERATURE: 98 F | BODY MASS INDEX: 21.53 KG/M2 | HEIGHT: 62 IN | WEIGHT: 117 LBS | HEART RATE: 72 BPM

## 2024-09-09 DIAGNOSIS — S06.6X9A TRAUMATIC SUBARACHNOID HEMORRHAGE WITH LOSS OF CONSCIOUSNESS OF UNSPECIFIED DURATION, INITIAL ENCOUNTER: ICD-10-CM

## 2024-09-09 DIAGNOSIS — G31.09 OTHER FRONTOTEMPORAL DEMENTIA: ICD-10-CM

## 2024-09-09 DIAGNOSIS — F01.50 VASCULAR DEMENTIA W/OUT BEHAVIORAL DISTURBANCE: ICD-10-CM

## 2024-09-09 DIAGNOSIS — F02.80 OTHER FRONTOTEMPORAL DEMENTIA: ICD-10-CM

## 2024-09-09 PROCEDURE — G2211 COMPLEX E/M VISIT ADD ON: CPT

## 2024-09-09 PROCEDURE — 99215 OFFICE O/P EST HI 40 MIN: CPT

## 2024-09-09 NOTE — ASSESSMENT
[FreeTextEntry1] : Ms. WALKER 78 year old female presents today as a follow up for dementia.  The differential was suspected to be vascular dementia vs Alzheimers disease.  Plan:  -Continue Aricept 5mg QD -Monitor Vascular risk factors -Consider switching sertraline to seroquel after speaking with psychiatrist this week -Encouraged exercise daily for atleast 10 minutes per day -f/u in 6 months -FDG PET for dementia (possibly frontottemporal dementia)

## 2024-09-09 NOTE — PHYSICAL EXAM
[FreeTextEntry1] : Mental status: Awake, alert and oriented to self, birthday, place, reason for visit. Unable to tell year, Month or name the POTUS. Is able to do 5+2, and 11-4. Unable to correctly place her left thumb over right ear. No dysarthria, no aphasia.    Cranial nerves: Pupils equally round and reactive to light, visual fields full, no nystagmus, extraocular muscles intact, V1 through V3 intact bilaterally and symmetric, face symmetric, hearing intact to finger rub, palate elevation symmetric, tongue was midline.  Motor:  MRC grading 5/5 b/l UE/LE.   strength 5/5.  Normal tone and bulk.  No abnormal movements. No muscle rigidity, no bradykinesias.   Sensation: Intact to light touch temperature, and vibration.   Coordination: No dysmetria on finger-to-nose.   Reflexes: 2+ in bilateral UE/LE.  Gait: Narrow and mildly unsteady. No ataxia.    [place] 9/30

## 2024-09-09 NOTE — HISTORY OF PRESENT ILLNESS
[FreeTextEntry1] : Since last visit he has continued to have hallucinations and behavioral episodes are increasing in frequency and occur daily and can be anytime of the day.  She will accuse her  or stealing, lying and can become violent.  She is not on seroquel and she is taking seroquel and benzodiazipne   Since last visit she has had visits to the hospital after a fall where she hit her head and was found to have a SDH.  She continues to have memory issues and  says she forgets things.  She has made mistakes with cooking so  cooks now.  She doesnt pay bills, she is afraid to shower because of her falls.  She also will forget medicines unless her  reminds her.  Has an Aunt with Dementia (fathers sister).  Vert Dissection is chronic as per family and notes.   Ms. WALKER 78 year old female presents today post hospitalization for syncope. She had a spontaneous fall with LOC. Dx with acute subarachnoid hemorrhage in l posterior temporal region, was started on Keppra in the hospital and was advised to take x1 week and f/u with neurosurgery OP.  Has a f/u with neurosurgery this week.   In regard to her syncope, the family thinks it was a spontaneous fall and not due to tripping. They said that it didn't look like she tried to catch herself on the scene. Prior to this had some imbalance issues and  would have to assist her ambulation. Denies dizziness, denies any recurrence of falls.   11/4/23: CT HEAD: Trace acute subarachnoid hemorrhage within the left posterior temporal region. No mass effect or midline shift. CT CERVICAL SPINE: No acute fracture or subluxation. _____________________________________________________________________________________________ Of note patient has been followed by psychiatry following diagnosis of dementia. Psychiatrist is concerned about presentation of her dementia given frequent falls. visual hallucinations, auditory hallucinations. She has been followed by Dr. Greenberg in the past for memory disturbance. Family noticed her symptoms started approximately 1 year ago but has worsened since the summer time.  Daughter reports short term memory is an issue. She often repeats herself or repeats questions. Long term memory is intact. ADLS are with some assistance d/t mobility. No gait shuffling or muscle rigidity. Hallucinations are occurring almost daily. No drooling, no staring into space. No incontinence. Had a few episodes where she couldn't recognize her . This happened about 3 times and one of the instances it was suspected to be due to a UTI. Has mood swings and increasing agitation. She takes Seroquel nightly, prior to this was taking lorazepam which she is no longer taking.  feels Seroquel is helping her, psychiatrist has been decreasing her dose due to night sweats.  December 2022: PET CT scan of the brain: significant cortical volume loss disproportionate for her typical age. This is not typical of Alzheimer's disease or frontotemporal dementia. August 2022: Neuropsych evaluation: Neurocognitive disorder, amnestic type. Vascular etiology, suspect VD vs. AD

## 2024-09-27 ENCOUNTER — RESULT REVIEW (OUTPATIENT)
Age: 79
End: 2024-09-27

## 2024-09-27 ENCOUNTER — OUTPATIENT (OUTPATIENT)
Dept: OUTPATIENT SERVICES | Facility: HOSPITAL | Age: 79
LOS: 1 days | End: 2024-09-27
Payer: MEDICARE

## 2024-09-27 DIAGNOSIS — Z00.8 ENCOUNTER FOR OTHER GENERAL EXAMINATION: ICD-10-CM

## 2024-09-27 DIAGNOSIS — G56.03 CARPAL TUNNEL SYNDROME, BILATERAL UPPER LIMBS: Chronic | ICD-10-CM

## 2024-09-27 DIAGNOSIS — Z98.890 OTHER SPECIFIED POSTPROCEDURAL STATES: Chronic | ICD-10-CM

## 2024-09-27 DIAGNOSIS — Z90.49 ACQUIRED ABSENCE OF OTHER SPECIFIED PARTS OF DIGESTIVE TRACT: Chronic | ICD-10-CM

## 2024-09-27 DIAGNOSIS — F01.50 VASCULAR DEMENTIA, UNSPECIFIED SEVERITY, WITHOUT BEHAVIORAL DISTURBANCE, PSYCHOTIC DISTURBANCE, MOOD DISTURBANCE, AND ANXIETY: ICD-10-CM

## 2024-09-27 LAB — GLUCOSE BLDC GLUCOMTR-MCNC: 91 MG/DL — SIGNIFICANT CHANGE UP (ref 70–99)

## 2024-09-27 PROCEDURE — 78608 BRAIN IMAGING (PET): CPT

## 2024-09-27 PROCEDURE — 82962 GLUCOSE BLOOD TEST: CPT

## 2024-09-27 PROCEDURE — 78608 BRAIN IMAGING (PET): CPT | Mod: 26

## 2024-09-27 PROCEDURE — A9552: CPT

## 2024-09-28 DIAGNOSIS — F01.50 VASCULAR DEMENTIA, UNSPECIFIED SEVERITY, WITHOUT BEHAVIORAL DISTURBANCE, PSYCHOTIC DISTURBANCE, MOOD DISTURBANCE, AND ANXIETY: ICD-10-CM

## 2024-11-19 NOTE — ED PROVIDER NOTE - PRINCIPAL DIAGNOSIS
PROCEDURE PERFORMED:  Bronchoscopy, and washing.    CONSENT: After explanining the risk and benefit the consent was obtained from the patient's daughter    The patient had been previously intubated and was on mechanical ventilatory support. She was on sedation, which was continued and adjusted during the procedure. Her FiO2 was increased to 100% during the procedure. The fiberoptic bronchoscope was introduced through an endotracheal tube adaptor and the tip of the endotracheal tube was noted to be in good position above the tor.   The Left tracheobronchial tree was inspected closely to the level of the subsegmental bronchi. Thick secretions were noted in the left upper and lower bronchi and were suctioned.  All bronchi are patent with no endobronchial lesions and no mucosal lesions noted.   The Right tracheobronchial tree was also patent and intact with the mucosa normal. Thick secretions were noted in the Right lower bronchi and were suctioned.   After adequate clearing of secretions was accomplished, the bronchoscope was removed from the patient and the procedure was terminated.   The procedure was completed and all samples were submitted for appropriate studies  The patient tolerated the procedure well and there were no complications. Mouth problem

## 2024-12-13 ENCOUNTER — APPOINTMENT (OUTPATIENT)
Dept: NEUROLOGY | Facility: CLINIC | Age: 79
End: 2024-12-13
Payer: MEDICARE

## 2024-12-13 VITALS
BODY MASS INDEX: 21.53 KG/M2 | HEIGHT: 62 IN | HEART RATE: 72 BPM | WEIGHT: 117 LBS | OXYGEN SATURATION: 97 % | DIASTOLIC BLOOD PRESSURE: 77 MMHG | SYSTOLIC BLOOD PRESSURE: 115 MMHG

## 2024-12-13 DIAGNOSIS — G31.84 MILD COGNITIVE IMPAIRMENT, SO STATED: ICD-10-CM

## 2024-12-13 DIAGNOSIS — F02.80 OTHER FRONTOTEMPORAL DEMENTIA: ICD-10-CM

## 2024-12-13 DIAGNOSIS — G31.09 OTHER FRONTOTEMPORAL DEMENTIA: ICD-10-CM

## 2024-12-13 PROCEDURE — 99215 OFFICE O/P EST HI 40 MIN: CPT

## 2024-12-13 PROCEDURE — G2211 COMPLEX E/M VISIT ADD ON: CPT

## 2024-12-13 RX ORDER — SERTRALINE HYDROCHLORIDE 50 MG/1
50 TABLET, FILM COATED ORAL DAILY
Refills: 0 | Status: DISCONTINUED | COMMUNITY
End: 2024-12-13

## 2024-12-13 RX ORDER — BUSPIRONE HYDROCHLORIDE 10 MG/1
10 TABLET ORAL DAILY
Refills: 0 | Status: ACTIVE | COMMUNITY

## 2024-12-13 RX ORDER — QUETIAPINE FUMARATE 25 MG/1
25 TABLET ORAL
Refills: 0 | Status: ACTIVE | COMMUNITY

## 2024-12-13 RX ORDER — LORAZEPAM 0.5 MG/1
0.5 TABLET ORAL DAILY
Refills: 0 | Status: ACTIVE | COMMUNITY

## 2024-12-19 ENCOUNTER — NON-APPOINTMENT (OUTPATIENT)
Age: 79
End: 2024-12-19

## 2024-12-21 RX ORDER — SERTRALINE HYDROCHLORIDE 100 MG/1
100 TABLET, FILM COATED ORAL DAILY
Qty: 45 | Refills: 3 | Status: ACTIVE | COMMUNITY
Start: 2024-12-13 | End: 1900-01-01

## 2025-01-06 ENCOUNTER — APPOINTMENT (OUTPATIENT)
Dept: NEUROLOGY | Facility: CLINIC | Age: 80
End: 2025-01-06

## 2025-01-09 ENCOUNTER — NON-APPOINTMENT (OUTPATIENT)
Age: 80
End: 2025-01-09

## 2025-04-25 ENCOUNTER — APPOINTMENT (OUTPATIENT)
Dept: NEUROLOGY | Facility: CLINIC | Age: 80
End: 2025-04-25
Payer: MEDICARE

## 2025-04-25 ENCOUNTER — NON-APPOINTMENT (OUTPATIENT)
Age: 80
End: 2025-04-25

## 2025-04-25 VITALS
SYSTOLIC BLOOD PRESSURE: 133 MMHG | BODY MASS INDEX: 19.65 KG/M2 | HEIGHT: 62 IN | HEART RATE: 64 BPM | WEIGHT: 106.8 LBS | DIASTOLIC BLOOD PRESSURE: 85 MMHG

## 2025-04-25 DIAGNOSIS — F02.80 OTHER FRONTOTEMPORAL DEMENTIA: ICD-10-CM

## 2025-04-25 DIAGNOSIS — G31.09 OTHER FRONTOTEMPORAL DEMENTIA: ICD-10-CM

## 2025-04-25 PROCEDURE — G2211 COMPLEX E/M VISIT ADD ON: CPT

## 2025-04-25 PROCEDURE — 99215 OFFICE O/P EST HI 40 MIN: CPT

## 2025-04-25 RX ORDER — QUETIAPINE FUMARATE 100 MG/1
100 TABLET ORAL DAILY
Refills: 0 | Status: ACTIVE | COMMUNITY

## 2025-04-25 RX ORDER — SERTRALINE HYDROCHLORIDE 150 MG/1
150 CAPSULE ORAL
Refills: 0 | Status: ACTIVE | COMMUNITY

## 2025-05-01 NOTE — ED PROVIDER NOTE - MDM ORDERS SUBMITTED SELECTION
Use inhaler every 4-6 hours while awake while you are feeling tight and wheezing.  Take prednisone for 5 days as directed.  Start taking cetirizine daily to help with seasonal allergies.  Take doxycycline for 7 days.  Follow-up closely with a primary doctor and return to emergency department for new or worrisome symptoms   Labs/Medications

## 2025-06-23 ENCOUNTER — RX RENEWAL (OUTPATIENT)
Age: 80
End: 2025-06-23

## 2025-07-07 ENCOUNTER — RX RENEWAL (OUTPATIENT)
Age: 80
End: 2025-07-07

## 2025-07-31 NOTE — ED ADULT TRIAGE NOTE - SPO2 (%)
Anesthesia Post-op Note    Patient: Vanda White  Procedure(s) Performed: EXCISIONs, CYST, VULVA Removal of right vulvar inclusion cysts (Right: Vulva)  COLPOSCOPY with biopsy (Cervix)  Anesthesia type: General    Vitals Value Taken Time   Temp 36.2 °C (97.1 °F) 07/31/25 1413   Pulse 57 07/31/25 1413   Resp 16 07/31/25 1413   SpO2 100 % 07/31/25 1413   /60 07/31/25 1413         Patient Location: PACU Phase 2  Post-op Vital Signs:stable  Level of Consciousness: participates in exam, awake, oriented, alert and answers questions appropriately  Respiratory Status: spontaneous ventilation and unassisted  Cardiovascular blood pressure returned to baseline  Hydration: euvolemic  Pain Management: well controlled  Vomiting: none  Nausea: None  Airway Patency:patent  Post-op Assessment: awake, alert, appropriately conversant, or baseline, no complications, patient tolerated procedure well and no evidence of recall      No notable events documented.                      
96

## 2025-08-04 ENCOUNTER — RX RENEWAL (OUTPATIENT)
Age: 80
End: 2025-08-04